# Patient Record
Sex: FEMALE | Race: WHITE | NOT HISPANIC OR LATINO | URBAN - METROPOLITAN AREA
[De-identification: names, ages, dates, MRNs, and addresses within clinical notes are randomized per-mention and may not be internally consistent; named-entity substitution may affect disease eponyms.]

---

## 2017-06-02 ENCOUNTER — OUTPATIENT (OUTPATIENT)
Dept: OUTPATIENT SERVICES | Facility: HOSPITAL | Age: 82
LOS: 1 days | Discharge: HOME | End: 2017-06-02

## 2017-06-02 DIAGNOSIS — N39.0 URINARY TRACT INFECTION, SITE NOT SPECIFIED: ICD-10-CM

## 2017-06-02 DIAGNOSIS — R07.9 CHEST PAIN, UNSPECIFIED: ICD-10-CM

## 2017-06-28 DIAGNOSIS — Z12.31 ENCOUNTER FOR SCREENING MAMMOGRAM FOR MALIGNANT NEOPLASM OF BREAST: ICD-10-CM

## 2017-06-28 DIAGNOSIS — Z85.3 PERSONAL HISTORY OF MALIGNANT NEOPLASM OF BREAST: ICD-10-CM

## 2017-09-28 ENCOUNTER — INPATIENT (INPATIENT)
Facility: HOSPITAL | Age: 82
LOS: 4 days | Discharge: NURSING, REHAB OR LONG-TERM CARE | End: 2017-10-03
Attending: INTERNAL MEDICINE | Admitting: INTERNAL MEDICINE

## 2017-09-28 DIAGNOSIS — N39.0 URINARY TRACT INFECTION, SITE NOT SPECIFIED: ICD-10-CM

## 2017-09-28 DIAGNOSIS — R07.9 CHEST PAIN, UNSPECIFIED: ICD-10-CM

## 2017-10-05 DIAGNOSIS — N10 ACUTE PYELONEPHRITIS: ICD-10-CM

## 2017-10-05 DIAGNOSIS — I12.9 HYPERTENSIVE CHRONIC KIDNEY DISEASE WITH STAGE 1 THROUGH STAGE 4 CHRONIC KIDNEY DISEASE, OR UNSPECIFIED CHRONIC KIDNEY DISEASE: ICD-10-CM

## 2017-10-05 DIAGNOSIS — B96.20 UNSPECIFIED ESCHERICHIA COLI [E. COLI] AS THE CAUSE OF DISEASES CLASSIFIED ELSEWHERE: ICD-10-CM

## 2017-10-05 DIAGNOSIS — N20.0 CALCULUS OF KIDNEY: ICD-10-CM

## 2017-10-05 DIAGNOSIS — K21.9 GASTRO-ESOPHAGEAL REFLUX DISEASE WITHOUT ESOPHAGITIS: ICD-10-CM

## 2017-10-05 DIAGNOSIS — N18.2 CHRONIC KIDNEY DISEASE, STAGE 2 (MILD): ICD-10-CM

## 2017-10-05 DIAGNOSIS — Z51.89 ENCOUNTER FOR OTHER SPECIFIED AFTERCARE: ICD-10-CM

## 2017-10-05 DIAGNOSIS — E78.5 HYPERLIPIDEMIA, UNSPECIFIED: ICD-10-CM

## 2017-10-05 DIAGNOSIS — Z85.3 PERSONAL HISTORY OF MALIGNANT NEOPLASM OF BREAST: ICD-10-CM

## 2017-10-05 DIAGNOSIS — K57.90 DIVERTICULOSIS OF INTESTINE, PART UNSPECIFIED, WITHOUT PERFORATION OR ABSCESS WITHOUT BLEEDING: ICD-10-CM

## 2017-10-05 DIAGNOSIS — Z85.72 PERSONAL HISTORY OF NON-HODGKIN LYMPHOMAS: ICD-10-CM

## 2017-10-16 DIAGNOSIS — N39.0 URINARY TRACT INFECTION, SITE NOT SPECIFIED: ICD-10-CM

## 2018-06-04 ENCOUNTER — OUTPATIENT (OUTPATIENT)
Dept: OUTPATIENT SERVICES | Facility: HOSPITAL | Age: 83
LOS: 1 days | Discharge: HOME | End: 2018-06-04

## 2018-06-04 DIAGNOSIS — Z85.3 PERSONAL HISTORY OF MALIGNANT NEOPLASM OF BREAST: ICD-10-CM

## 2018-06-04 DIAGNOSIS — Z12.31 ENCOUNTER FOR SCREENING MAMMOGRAM FOR MALIGNANT NEOPLASM OF BREAST: ICD-10-CM

## 2019-01-15 ENCOUNTER — EMERGENCY (EMERGENCY)
Facility: HOSPITAL | Age: 84
LOS: 0 days | Discharge: HOME | End: 2019-01-15
Attending: EMERGENCY MEDICINE | Admitting: EMERGENCY MEDICINE

## 2019-01-15 VITALS
SYSTOLIC BLOOD PRESSURE: 172 MMHG | TEMPERATURE: 98 F | DIASTOLIC BLOOD PRESSURE: 72 MMHG | OXYGEN SATURATION: 99 % | RESPIRATION RATE: 18 BRPM | HEART RATE: 77 BPM

## 2019-01-15 VITALS
OXYGEN SATURATION: 96 % | RESPIRATION RATE: 18 BRPM | TEMPERATURE: 97 F | HEART RATE: 64 BPM | SYSTOLIC BLOOD PRESSURE: 164 MMHG | DIASTOLIC BLOOD PRESSURE: 71 MMHG

## 2019-01-15 DIAGNOSIS — Z79.899 OTHER LONG TERM (CURRENT) DRUG THERAPY: ICD-10-CM

## 2019-01-15 DIAGNOSIS — R10.9 UNSPECIFIED ABDOMINAL PAIN: ICD-10-CM

## 2019-01-15 DIAGNOSIS — K59.00 CONSTIPATION, UNSPECIFIED: ICD-10-CM

## 2019-01-15 DIAGNOSIS — K21.9 GASTRO-ESOPHAGEAL REFLUX DISEASE WITHOUT ESOPHAGITIS: ICD-10-CM

## 2019-01-15 DIAGNOSIS — R35.0 FREQUENCY OF MICTURITION: ICD-10-CM

## 2019-01-15 DIAGNOSIS — Z90.49 ACQUIRED ABSENCE OF OTHER SPECIFIED PARTS OF DIGESTIVE TRACT: ICD-10-CM

## 2019-01-15 DIAGNOSIS — K62.89 OTHER SPECIFIED DISEASES OF ANUS AND RECTUM: ICD-10-CM

## 2019-01-15 DIAGNOSIS — E78.5 HYPERLIPIDEMIA, UNSPECIFIED: ICD-10-CM

## 2019-01-15 DIAGNOSIS — R30.0 DYSURIA: ICD-10-CM

## 2019-01-15 DIAGNOSIS — Z98.890 OTHER SPECIFIED POSTPROCEDURAL STATES: ICD-10-CM

## 2019-01-15 DIAGNOSIS — I12.9 HYPERTENSIVE CHRONIC KIDNEY DISEASE WITH STAGE 1 THROUGH STAGE 4 CHRONIC KIDNEY DISEASE, OR UNSPECIFIED CHRONIC KIDNEY DISEASE: ICD-10-CM

## 2019-01-15 DIAGNOSIS — N18.3 CHRONIC KIDNEY DISEASE, STAGE 3 (MODERATE): ICD-10-CM

## 2019-01-15 DIAGNOSIS — Z90.11 ACQUIRED ABSENCE OF RIGHT BREAST AND NIPPLE: ICD-10-CM

## 2019-01-15 LAB
ALBUMIN SERPL ELPH-MCNC: 4.6 G/DL — SIGNIFICANT CHANGE UP (ref 3.5–5.2)
ALP SERPL-CCNC: 69 U/L — SIGNIFICANT CHANGE UP (ref 30–115)
ALT FLD-CCNC: 24 U/L — SIGNIFICANT CHANGE UP (ref 0–41)
ANION GAP SERPL CALC-SCNC: 16 MMOL/L — HIGH (ref 7–14)
APPEARANCE UR: ABNORMAL
AST SERPL-CCNC: 95 U/L — HIGH (ref 0–41)
BASE EXCESS BLDV CALC-SCNC: 2.8 MMOL/L — HIGH (ref -2–2)
BASOPHILS # BLD AUTO: 0.12 K/UL — SIGNIFICANT CHANGE UP (ref 0–0.2)
BASOPHILS NFR BLD AUTO: 0.9 % — SIGNIFICANT CHANGE UP (ref 0–1)
BILIRUB SERPL-MCNC: 0.3 MG/DL — SIGNIFICANT CHANGE UP (ref 0.2–1.2)
BILIRUB UR-MCNC: NEGATIVE — SIGNIFICANT CHANGE UP
BUN SERPL-MCNC: 24 MG/DL — HIGH (ref 10–20)
CA-I SERPL-SCNC: 1.27 MMOL/L — SIGNIFICANT CHANGE UP (ref 1.12–1.3)
CALCIUM SERPL-MCNC: 10 MG/DL — SIGNIFICANT CHANGE UP (ref 8.5–10.1)
CHLORIDE SERPL-SCNC: 96 MMOL/L — LOW (ref 98–110)
CO2 SERPL-SCNC: 23 MMOL/L — SIGNIFICANT CHANGE UP (ref 17–32)
COLOR SPEC: YELLOW — SIGNIFICANT CHANGE UP
CREAT SERPL-MCNC: 1.2 MG/DL — SIGNIFICANT CHANGE UP (ref 0.7–1.5)
DIFF PNL FLD: NEGATIVE — SIGNIFICANT CHANGE UP
EOSINOPHIL # BLD AUTO: 0.63 K/UL — SIGNIFICANT CHANGE UP (ref 0–0.7)
EOSINOPHIL NFR BLD AUTO: 4.7 % — SIGNIFICANT CHANGE UP (ref 0–8)
EPI CELLS # UR: ABNORMAL /HPF
GAS PNL BLDV: 141 MMOL/L — SIGNIFICANT CHANGE UP (ref 136–145)
GAS PNL BLDV: SIGNIFICANT CHANGE UP
GLUCOSE SERPL-MCNC: 101 MG/DL — HIGH (ref 70–99)
GLUCOSE UR QL: NEGATIVE MG/DL — SIGNIFICANT CHANGE UP
HCO3 BLDV-SCNC: 29 MMOL/L — SIGNIFICANT CHANGE UP (ref 22–29)
HCT VFR BLD CALC: 44.2 % — SIGNIFICANT CHANGE UP (ref 37–47)
HCT VFR BLDA CALC: 44 % — SIGNIFICANT CHANGE UP (ref 34–44)
HGB BLD CALC-MCNC: 14.4 G/DL — SIGNIFICANT CHANGE UP (ref 14–18)
HGB BLD-MCNC: 14.2 G/DL — SIGNIFICANT CHANGE UP (ref 12–16)
IMM GRANULOCYTES NFR BLD AUTO: 0.2 % — SIGNIFICANT CHANGE UP (ref 0.1–0.3)
KETONES UR-MCNC: NEGATIVE — SIGNIFICANT CHANGE UP
LACTATE BLDV-MCNC: 0.7 MMOL/L — SIGNIFICANT CHANGE UP (ref 0.5–1.6)
LACTATE SERPL-SCNC: 0.7 MMOL/L — SIGNIFICANT CHANGE UP (ref 0.5–2.2)
LEUKOCYTE ESTERASE UR-ACNC: NEGATIVE — SIGNIFICANT CHANGE UP
LYMPHOCYTES # BLD AUTO: 24.8 % — SIGNIFICANT CHANGE UP (ref 20.5–51.1)
LYMPHOCYTES # BLD AUTO: 3.33 K/UL — SIGNIFICANT CHANGE UP (ref 1.2–3.4)
MCHC RBC-ENTMCNC: 27.2 PG — SIGNIFICANT CHANGE UP (ref 27–31)
MCHC RBC-ENTMCNC: 32.1 G/DL — SIGNIFICANT CHANGE UP (ref 32–37)
MCV RBC AUTO: 84.5 FL — SIGNIFICANT CHANGE UP (ref 81–99)
MONOCYTES # BLD AUTO: 1 K/UL — HIGH (ref 0.1–0.6)
MONOCYTES NFR BLD AUTO: 7.5 % — SIGNIFICANT CHANGE UP (ref 1.7–9.3)
NEUTROPHILS # BLD AUTO: 8.31 K/UL — HIGH (ref 1.4–6.5)
NEUTROPHILS NFR BLD AUTO: 61.9 % — SIGNIFICANT CHANGE UP (ref 42.2–75.2)
NITRITE UR-MCNC: NEGATIVE — SIGNIFICANT CHANGE UP
NRBC # BLD: 0 /100 WBCS — SIGNIFICANT CHANGE UP (ref 0–0)
PCO2 BLDV: 51 MMHG — SIGNIFICANT CHANGE UP (ref 41–51)
PH BLDV: 7.37 — SIGNIFICANT CHANGE UP (ref 7.26–7.43)
PH UR: 6.5 — SIGNIFICANT CHANGE UP (ref 5–8)
PLATELET # BLD AUTO: 299 K/UL — SIGNIFICANT CHANGE UP (ref 130–400)
PO2 BLDV: 28 MMHG — SIGNIFICANT CHANGE UP (ref 20–40)
POTASSIUM BLDV-SCNC: 4.4 MMOL/L — SIGNIFICANT CHANGE UP (ref 3.3–5.6)
POTASSIUM SERPL-MCNC: 7 MMOL/L — CRITICAL HIGH (ref 3.5–5)
POTASSIUM SERPL-SCNC: 7 MMOL/L — CRITICAL HIGH (ref 3.5–5)
PROT SERPL-MCNC: 7.9 G/DL — SIGNIFICANT CHANGE UP (ref 6–8)
PROT UR-MCNC: NEGATIVE MG/DL — SIGNIFICANT CHANGE UP
RBC # BLD: 5.23 M/UL — SIGNIFICANT CHANGE UP (ref 4.2–5.4)
RBC # FLD: 13.8 % — SIGNIFICANT CHANGE UP (ref 11.5–14.5)
SAO2 % BLDV: 50 % — SIGNIFICANT CHANGE UP
SODIUM SERPL-SCNC: 135 MMOL/L — SIGNIFICANT CHANGE UP (ref 135–146)
SP GR SPEC: <=1.005 — SIGNIFICANT CHANGE UP (ref 1.01–1.03)
UROBILINOGEN FLD QL: 0.2 MG/DL — SIGNIFICANT CHANGE UP (ref 0.2–0.2)
WBC # BLD: 13.42 K/UL — HIGH (ref 4.8–10.8)
WBC # FLD AUTO: 13.42 K/UL — HIGH (ref 4.8–10.8)
WBC UR QL: SIGNIFICANT CHANGE UP /HPF

## 2019-01-15 RX ORDER — IOHEXOL 300 MG/ML
30 INJECTION, SOLUTION INTRAVENOUS ONCE
Qty: 0 | Refills: 0 | Status: COMPLETED | OUTPATIENT
Start: 2019-01-15 | End: 2019-01-15

## 2019-01-15 RX ORDER — CIPROFLOXACIN LACTATE 400MG/40ML
1 VIAL (ML) INTRAVENOUS
Qty: 20 | Refills: 0
Start: 2019-01-15 | End: 2019-01-24

## 2019-01-15 RX ORDER — CIPROFLOXACIN LACTATE 400MG/40ML
250 VIAL (ML) INTRAVENOUS ONCE
Qty: 0 | Refills: 0 | Status: COMPLETED | OUTPATIENT
Start: 2019-01-15 | End: 2019-01-15

## 2019-01-15 RX ORDER — METRONIDAZOLE 500 MG
1 TABLET ORAL
Qty: 30 | Refills: 0
Start: 2019-01-15 | End: 2019-01-24

## 2019-01-15 RX ORDER — SODIUM CHLORIDE 9 MG/ML
1000 INJECTION INTRAMUSCULAR; INTRAVENOUS; SUBCUTANEOUS ONCE
Qty: 0 | Refills: 0 | Status: COMPLETED | OUTPATIENT
Start: 2019-01-15 | End: 2019-01-15

## 2019-01-15 RX ORDER — METRONIDAZOLE 500 MG
500 TABLET ORAL ONCE
Qty: 0 | Refills: 0 | Status: COMPLETED | OUTPATIENT
Start: 2019-01-15 | End: 2019-01-15

## 2019-01-15 RX ADMIN — Medication 250 MILLIGRAM(S): at 18:07

## 2019-01-15 RX ADMIN — IOHEXOL 30 MILLILITER(S): 300 INJECTION, SOLUTION INTRAVENOUS at 12:23

## 2019-01-15 RX ADMIN — SODIUM CHLORIDE 2000 MILLILITER(S): 9 INJECTION INTRAMUSCULAR; INTRAVENOUS; SUBCUTANEOUS at 13:11

## 2019-01-15 RX ADMIN — Medication 500 MILLIGRAM(S): at 18:07

## 2019-01-15 NOTE — ED PROVIDER NOTE - OBJECTIVE STATEMENT
82yo woman h/o HTN, HLD, hiatal hernia, breast CA s/p R mastectomy, lymphoma in remission, bladder prolapse with frequent UTI, diverticulitis presents with constipation 4 days, which is unusual for her. Only surgical history is appendectomy at age 14. She reports that she has been passing some mucous but very little flatus. Denies fever, chills, vomiting, and has been eating ok, but reports some nausea this morning. Additionally she reports urinary frequency with dysuria--she follows with urology for the frequent UTI and takes macrobid for suppression, but hasn't over the last few days because she hasn't been feeling well. She has some crampy discomfort but no chay abdominal pain, but she is very concerned because straining causes increased frequency of prolapse and that is uncomfortable. 82yo woman h/o HTN, HLD, CKD3, hiatal hernia, breast CA s/p R mastectomy, lymphoma in remission, bladder prolapse with frequent UTI, diverticulitis presents with constipation 4 days, which is unusual for her. Only surgical history is appendectomy at age 14. She reports that she has been passing some mucous but very little flatus. Denies fever, chills, vomiting, and has been eating ok, but reports some nausea this morning. Additionally she reports urinary frequency with dysuria--she follows with urology for the frequent UTI and takes macrobid for suppression, but hasn't over the last few days because she hasn't been feeling well. She has some crampy discomfort but no chay abdominal pain, but she is very concerned because straining causes increased frequency of prolapse and that is uncomfortable. 84yo woman h/o HTN, HLD, CKD3, hiatal hernia, breast CA s/p R mastectomy, lymphoma in remission, bladder prolapse with frequent UTI, diverticulitis presents with constipation 4 days, which is unusual for her. Only surgical history is appendectomy at age 14. She reports that she has been passing some mucous but very little flatus. Denies fever, chills, vomiting, and has been eating ok, but reports some nausea this morning. Additionally she reports urinary frequency with dysuria--she follows with urology for the frequent UTI and takes macrobid for suppression, but hasn't over the last few days because she hasn't been feeling well. She has some crampy discomfort but no chay abdominal pain, but she is very concerned because straining causes increased frequency of prolapse and that is uncomfortable. No relief with colace.

## 2019-01-15 NOTE — ED PROVIDER NOTE - NSFOLLOWUPINSTRUCTIONS_ED_ALL_ED_FT
1. begin taking ciprofloxacin and flagyl tomorrow morning. Cipro is to be taken every 12 hours, Flagyl every 8 hours for the next 10 days.  2. begin taking Miralax 1 capful every 12 hours (over the counter) until you begin moving your bowels, then go down to once per day.  3. follow up with Dr Toney of GI.  4. return to the ER for worsening pain, fever, vomiting, or any other concerns.

## 2019-01-15 NOTE — ED ADULT NURSE NOTE - PMH
Breast CA    GERD (gastroesophageal reflux disease)    Hernia    HTN (hypertension)    Prolapsed uterus

## 2019-01-15 NOTE — ED PROVIDER NOTE - CARE PLAN
Assessment and plan of treatment:	A/P constipation with concern for underlying pathology such as diverticulitis vs UTI, less likely SBO. Will check labs, CT, reassess. Principal Discharge DX:	Proctitis  Assessment and plan of treatment:	A/P constipation with concern for underlying pathology such as diverticulitis vs UTI, less likely SBO. Will check labs, CT, reassess.

## 2019-01-15 NOTE — ED PROVIDER NOTE - PHYSICAL EXAMINATION
VITAL SIGNS: I have reviewed nursing notes and confirm.  CONSTITUTIONAL: Looks younger than stated age, nontoxic appearing and comfortable sitting up on edge of stretcher  SKIN: Skin exam is warm and dry, no acute rash.  HEAD: Normocephalic; atraumatic.  EYES: PERRL, EOM intact; conjunctiva and sclera clear.  ENT: No nasal discharge; airway clear. TMs clear.  NECK: Supple; non tender.  CARD: S1, S2 RRR  RESP: No wheezes, rales or rhonchi.  ABD: Distended, soft, very mild LLQ tenderness to deep palpation, no peritoneal signs  EXT: Normal ROM. No clubbing, cyanosis or edema.  LYMPH: No acute cervical adenopathy.  NEURO: Alert, oriented. Grossly unremarkable. No focal deficits.  PSYCH: Cooperative, appropriate.

## 2019-01-15 NOTE — ED PROVIDER NOTE - PLAN OF CARE
A/P constipation with concern for underlying pathology such as diverticulitis vs UTI, less likely SBO. Will check labs, CT, reassess.

## 2019-01-15 NOTE — ED PROVIDER NOTE - NS ED ROS FT
Constitutional: (-) fever  Eyes/ENT: (-) blurry vision, (-) epistaxis  Cardiovascular: (-) chest pain, (-) syncope  Respiratory: (-) cough, (-) shortness of breath  Gastrointestinal: (-) vomiting, (-) diarrhea (SEE HPI)  Musculoskeletal: (-) neck pain, (-) back pain, (-) joint pain  Integumentary: (-) rash, (-) edema  Neurological: (-) headache, (-) altered mental status  Psychiatric: (-) hallucinations  Allergic/Immunologic: (-) pruritus

## 2019-01-15 NOTE — ED PROVIDER NOTE - MEDICAL DECISION MAKING DETAILS
CT with proctitis vs neoplasm. As sx so recent, more likely proctitis, pt has good f/u with PMD and GI, given course of cipro/flagyl and rec mirolax, return to the ED for worsening sx.

## 2019-01-16 LAB
CULTURE RESULTS: SIGNIFICANT CHANGE UP
SPECIMEN SOURCE: SIGNIFICANT CHANGE UP

## 2019-01-23 ENCOUNTER — MOBILE ON CALL (OUTPATIENT)
Age: 84
End: 2019-01-23

## 2019-01-23 PROBLEM — I10 ESSENTIAL (PRIMARY) HYPERTENSION: Chronic | Status: ACTIVE | Noted: 2019-01-15

## 2019-01-23 PROBLEM — N81.4 UTEROVAGINAL PROLAPSE, UNSPECIFIED: Chronic | Status: ACTIVE | Noted: 2019-01-15

## 2019-01-23 PROBLEM — K46.9 UNSPECIFIED ABDOMINAL HERNIA WITHOUT OBSTRUCTION OR GANGRENE: Chronic | Status: ACTIVE | Noted: 2019-01-15

## 2019-01-23 PROBLEM — C50.919 MALIGNANT NEOPLASM OF UNSPECIFIED SITE OF UNSPECIFIED FEMALE BREAST: Chronic | Status: ACTIVE | Noted: 2019-01-15

## 2019-01-23 PROBLEM — K21.9 GASTRO-ESOPHAGEAL REFLUX DISEASE WITHOUT ESOPHAGITIS: Chronic | Status: ACTIVE | Noted: 2019-01-15

## 2019-02-04 ENCOUNTER — APPOINTMENT (OUTPATIENT)
Dept: OBGYN | Facility: CLINIC | Age: 84
End: 2019-02-04

## 2019-02-06 ENCOUNTER — APPOINTMENT (OUTPATIENT)
Dept: UROGYNECOLOGY | Facility: CLINIC | Age: 84
End: 2019-02-06

## 2019-02-06 ENCOUNTER — OUTPATIENT (OUTPATIENT)
Dept: OUTPATIENT SERVICES | Facility: HOSPITAL | Age: 84
LOS: 1 days | Discharge: HOME | End: 2019-02-06

## 2019-02-06 DIAGNOSIS — I10 ESSENTIAL (PRIMARY) HYPERTENSION: ICD-10-CM

## 2019-02-06 DIAGNOSIS — N81.9 FEMALE GENITAL PROLAPSE, UNSPECIFIED: ICD-10-CM

## 2019-02-06 DIAGNOSIS — Z87.440 PERSONAL HISTORY OF URINARY (TRACT) INFECTIONS: ICD-10-CM

## 2019-02-06 DIAGNOSIS — N28.9 DISORDER OF KIDNEY AND URETER, UNSPECIFIED: ICD-10-CM

## 2019-02-06 DIAGNOSIS — N81.2 INCOMPLETE UTEROVAGINAL PROLAPSE: ICD-10-CM

## 2019-02-06 DIAGNOSIS — R73.03 PREDIABETES.: ICD-10-CM

## 2019-02-06 DIAGNOSIS — K57.90 DIVERTICULOSIS OF INTESTINE, PART UNSPECIFIED, W/OUT PERFORATION OR ABSCESS W/OUT BLEEDING: ICD-10-CM

## 2019-02-06 NOTE — DISCUSSION/SUMMARY
[FreeTextEntry1] : \par Complete Uterovaginal Prolapse:\par The patient was counseled regarding the possible natural progression of prolapse and the clinical consequences of worsening prolapse. The stage and the location of the prolapse was reviewed with the patient. She was counseled regarding the management strategies including observation, pessary placement and surgery (Lefort colpocleisis/posterior colporrhaphy/perineorrhaphy). The patient voiced understanding and agrees to consider her options.\par  \par Constipation:\par The patient was counseled about her defecatory dysfunction. We discussed the importance of fiber, hydration and exercise. She was given a handout with specific information about dietary fiber and ways to relieve constipation. \par \par Fecal Incontinence:\par The patient was counseled about her fecal incontinence.  We discussed the importance of dietary fiber, hydration, physical activity, pelvic floor rehabilitation medication and minimally invasive surgical procedures.  She was given a handout with specific information about dietary fiber and ways to relieve fecal incontinence.\par \par Urinary Frequency:\par The pathophysiology of the above condition was discussed with the patient. The patient was given information and education on pelvic floor muscle exercises/rehabilitation, avoidance of dietary bladder irritants, and other strategies to improve bladder control, such as scheduled voiding. She was counseled regarding further management strategies for overactive bladder and urge incontinence including pelvic floor physical therapy, medications or surgical management. The patient voiced understanding and agrees with diet changes. We will follow up on her urine tests.\par

## 2019-02-06 NOTE — COUNSELING
[FreeTextEntry1] : \par Please follow up with your Gastroenterologist. \par \par We will contact you if the urine results are abnormal. \par \par Make changes to your diet as listed on the handout to decrease irritation to the bladder. \par \par Please increase your daily water intake (at least 4 cups a day). \par \par Please follow our recommended bowel recipe to control your constipation. \par \par Please review the handouts about treatments for the vaginal bulge/prolapse (the bladder, the rectum and the uterus/womb is herniating through the vagina):\par 1) pessary-device into the vagina\par 2) surgery-colpocleisis (vaginal approach)\par \par Please let us know if you decide on a management for the prolapse\par \par Deepest condolences to you and  your family. \par

## 2019-02-06 NOTE — HISTORY OF PRESENT ILLNESS
[FreeTextEntry1] : \par Pt with pelvic floor dysfunction here for urogynecologic evaluation. She describes: \par Referring provider: Dr CRUZITO Sharma\par PCP: Dr Patrick Stallings\par \par Chief PFD: pain and bulge\par \par Last UTI 8/18.\par Is taking macrodantin 3x/week for prophylaxis for UTI for the past 6 months\par CKD Cr 1.16 11/18\par \par Pelvic organ prolapse: s/p appy, s/p hernia surgery x2, +bulge for the past 2 months, worsening. Denies treatment and splinting\par Stress urinary incontinence: for the past month, small volumes\par Overactive bladder syndrome: voids q 1 hr? Bothersome. Pain and urgency when she attempts to hold urine, improve with voiding. + Nocturia. Denies enuresis, incontinence, prior treatment, glaucoma.\par Voiding dysfunction: Occasional Incomplete bladder emptying,denies hesitancy \par Lower urinary tract/vaginal symptoms: no recurrent UTIs per year, no hematuria, + dysuria for the past week, as above bladder pain \par Fecal incontinence: denies but has leakage of mucus from rectum\par Defecatory dysfunction: small balls. Last colonoscopy 4 yrs ago\par Sexual dysfunction: Not sexually active. \par Pelvic pain: constant sharp lower quadrant pain. Voiding and bowel movements alleviate the pain. 6/10 intensity. Denies radiation, aggravating factors. for the last two weeks. has it currently.\par Vaginal dryness: hx of breast cancer, denies vaginal dryness\par \par Her pelvic floor symptoms are significantly bothersome and negatively impacting her quality of life. \par \par

## 2019-02-07 ENCOUNTER — APPOINTMENT (OUTPATIENT)
Dept: HEMATOLOGY ONCOLOGY | Facility: CLINIC | Age: 84
End: 2019-02-07

## 2019-02-07 LAB
APPEARANCE: CLEAR
BILIRUBIN URINE: NEGATIVE
BLOOD URINE: NEGATIVE
COLOR: YELLOW
GLUCOSE QUALITATIVE U: NEGATIVE MG/DL
KETONES URINE: NEGATIVE
LEUKOCYTE ESTERASE URINE: NEGATIVE
NITRITE URINE: NEGATIVE
PH URINE: 6
PROTEIN URINE: NEGATIVE MG/DL
SPECIFIC GRAVITY URINE: 1.01
UROBILINOGEN URINE: 0.2 MG/DL (ref 0.2–?)

## 2019-02-08 LAB — BACTERIA UR CULT: NORMAL

## 2019-02-12 DIAGNOSIS — N81.3 COMPLETE UTEROVAGINAL PROLAPSE: ICD-10-CM

## 2019-02-12 DIAGNOSIS — R15.9 FULL INCONTINENCE OF FECES: ICD-10-CM

## 2019-02-12 DIAGNOSIS — K59.00 CONSTIPATION, UNSPECIFIED: ICD-10-CM

## 2019-02-12 DIAGNOSIS — R35.0 FREQUENCY OF MICTURITION: ICD-10-CM

## 2019-02-21 ENCOUNTER — APPOINTMENT (OUTPATIENT)
Dept: HEMATOLOGY ONCOLOGY | Facility: CLINIC | Age: 84
End: 2019-02-21

## 2019-03-04 ENCOUNTER — APPOINTMENT (OUTPATIENT)
Dept: HEMATOLOGY ONCOLOGY | Facility: CLINIC | Age: 84
End: 2019-03-04

## 2019-03-04 ENCOUNTER — LABORATORY RESULT (OUTPATIENT)
Age: 84
End: 2019-03-04

## 2019-03-04 ENCOUNTER — OUTPATIENT (OUTPATIENT)
Dept: OUTPATIENT SERVICES | Facility: HOSPITAL | Age: 84
LOS: 1 days | Discharge: HOME | End: 2019-03-04

## 2019-03-04 VITALS
WEIGHT: 158 LBS | BODY MASS INDEX: 31.02 KG/M2 | SYSTOLIC BLOOD PRESSURE: 160 MMHG | TEMPERATURE: 97.6 F | RESPIRATION RATE: 14 BRPM | HEART RATE: 80 BPM | HEIGHT: 60 IN | DIASTOLIC BLOOD PRESSURE: 74 MMHG

## 2019-03-04 DIAGNOSIS — Z80.9 FAMILY HISTORY OF MALIGNANT NEOPLASM, UNSPECIFIED: ICD-10-CM

## 2019-03-04 DIAGNOSIS — Z78.9 OTHER SPECIFIED HEALTH STATUS: ICD-10-CM

## 2019-03-04 LAB
HCT VFR BLD CALC: 41 %
HGB BLD-MCNC: 13.6 G/DL
MCHC RBC-ENTMCNC: 28.3 PG
MCHC RBC-ENTMCNC: 33.2 G/DL
MCV RBC AUTO: 85.2 FL
PLATELET # BLD AUTO: 254 K/UL
PMV BLD: 11 FL
RBC # BLD: 4.81 M/UL
RBC # FLD: 13.3 %
WBC # FLD AUTO: 12.31 K/UL

## 2019-03-04 NOTE — ASSESSMENT
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 \par \par PLAN:\par # Stage I right breast Ca s/p mastectomy/chemotherapy/adjuvant hormonal treatment several years ago, remaining in remission.\par - Last mammo 6/18: Negative.\par - Patient due to for mammogram 6/19, will give request.. \par - Clinical exam negative. \par \par # Stage II DLBCL s/p R-CHOP also several years ago:\par - Patient is asymptomatic, no fevers, chills, night sweats or weight loss.\par - Will check CBC, CMP, LDH\par \par All questions answered. \par Patient seen and examined with Dr Allison, who agreed with the above assessment and plan.

## 2019-03-04 NOTE — HISTORY OF PRESENT ILLNESS
[de-identified] : 82yo F with history of Stage II DLBCL s/p R-CHOP as well as Stage I right breast carcinoma s/p mastectomy followed by chemotherapy and adjuvant hormonal therapy. Follows on a yearly basis, is currently asymptomatic without any evidence of disease.  [de-identified] : 3/4/19\par Patient here for follow up of DLBCL and breast carcinoma.\par Denies any fevers, chills, night sweats, weight loss. \par She has been having frequent urinary tract infections, is following with Dr Lopez (urologist) who started her on Macrodantin. \par Was recently seen by Ob-Gyn and was found to have uterine prolapse.\par Last mammo 5/18: negative.\par Last colonoscopy 3 years ago: Diverticulosis.

## 2019-03-04 NOTE — PHYSICAL EXAM
[Restricted in physically strenuous activity but ambulatory and able to carry out work of a light or sedentary nature] : Status 1- Restricted in physically strenuous activity but ambulatory and able to carry out work of a light or sedentary nature, e.g., light house work, office work [Obese] : obese [Normal] : grossly intact [de-identified] : Right mastectomy site without any palpable abnormalities, left breast no masses or palpable adenopathy [de-identified] : Mild epigastric tenderness

## 2019-03-04 NOTE — REVIEW OF SYSTEMS
[Fatigue] : fatigue [Joint Pain] : joint pain [Joint Stiffness] : joint stiffness [Negative] : Endocrine [FreeTextEntry7] : Diverticulosis, epigastric pain [FreeTextEntry8] : Prolapsed Uterus

## 2019-03-04 NOTE — CONSULT LETTER
[Dear  ___] : Dear  [unfilled], [Please see my note below.] : Please see my note below. [Consult Closing:] : Thank you very much for allowing me to participate in the care of this patient.  If you have any questions, please do not hesitate to contact me. [Sincerely,] : Sincerely, [DrGama  ___] : Dr. REYES [Courtesy Letter:] : I had the pleasure of seeing your patient, [unfilled], in my office today. [FreeTextEntry2] : Dr. Patrick Amaya [FreeTextEntry3] : Venkata Allison MD

## 2019-03-05 LAB
ALBUMIN SERPL ELPH-MCNC: 4.6 G/DL
ALP BLD-CCNC: 76 U/L
ALT SERPL-CCNC: 13 U/L
ANION GAP SERPL CALC-SCNC: 16 MMOL/L
AST SERPL-CCNC: 23 U/L
BILIRUB SERPL-MCNC: 0.4 MG/DL
BUN SERPL-MCNC: 27 MG/DL
CALCIUM SERPL-MCNC: 10.3 MG/DL
CHLORIDE SERPL-SCNC: 106 MMOL/L
CO2 SERPL-SCNC: 19 MMOL/L
CREAT SERPL-MCNC: 1.2 MG/DL
GLUCOSE SERPL-MCNC: 85 MG/DL
LDH SERPL-CCNC: 226
POTASSIUM SERPL-SCNC: 4.7 MMOL/L
PROT SERPL-MCNC: 7.5 G/DL
SODIUM SERPL-SCNC: 141 MMOL/L

## 2019-03-05 RX ORDER — NITROFURANTOIN (MONOHYDRATE/MACROCRYSTALS) 25; 75 MG/1; MG/1
100 CAPSULE ORAL TWICE DAILY
Qty: 14 | Refills: 0 | Status: COMPLETED | COMMUNITY
Start: 2019-03-05 | End: 2019-03-12

## 2019-03-06 DIAGNOSIS — Z85.72 PERSONAL HISTORY OF NON-HODGKIN LYMPHOMAS: ICD-10-CM

## 2019-03-06 DIAGNOSIS — Z85.3 PERSONAL HISTORY OF MALIGNANT NEOPLASM OF BREAST: ICD-10-CM

## 2019-03-13 ENCOUNTER — APPOINTMENT (OUTPATIENT)
Dept: UROGYNECOLOGY | Facility: CLINIC | Age: 84
End: 2019-03-13

## 2019-03-13 ENCOUNTER — OUTPATIENT (OUTPATIENT)
Dept: OUTPATIENT SERVICES | Facility: HOSPITAL | Age: 84
LOS: 1 days | Discharge: HOME | End: 2019-03-13

## 2019-03-13 VITALS — DIASTOLIC BLOOD PRESSURE: 70 MMHG | SYSTOLIC BLOOD PRESSURE: 130 MMHG

## 2019-03-13 DIAGNOSIS — R35.0 FREQUENCY OF MICTURITION: ICD-10-CM

## 2019-03-13 RX ORDER — NITROFURANTOIN MACROCRYSTALS 50 MG/1
CAPSULE ORAL
Refills: 0 | Status: DISCONTINUED | COMMUNITY
End: 2019-03-13

## 2019-03-13 NOTE — COUNSELING
[FreeTextEntry1] : \par We will notify you of your urine results. \par \par Please schedule a pelvic ultrasound\par \par Please increase your daily water intake (at least 4 cups a day). \par \par Please begin taking Cipro 500 mg, twice a day for 7 days.\par \par Please take Diflucan 150 mg, one time dose. Take it the same day you start taking Cipro.\par \par Please schedule a pessary fitting/PVR check with GILDARDO Brown

## 2019-03-13 NOTE — HISTORY OF PRESENT ILLNESS
[FreeTextEntry1] : \par Patient is here for JUAN CARLOS\par Last seen 2/6/19 as NP for prolapse, urinary frequency, fecal incontinence, and constipation\par \par Patient called 3/5/19, c/o dysuria and frequency. Empirically prescribed Macrobid 100 mg BID x 7 days.\par Ucx was contaminated, and patient stated she was not feeling any better.\par She continues to have dysuria and abdominal cramping. Also states her prolapse is becoming bothersome and has mild spotting.\par \par Patient states she would like to have UTI taken care of prior to doing pessary fitting. However, she agrees to a pessary fitting.

## 2019-03-15 LAB — BACTERIA UR CULT: NORMAL

## 2019-03-15 RX ORDER — FLUCONAZOLE 150 MG/1
150 TABLET ORAL
Qty: 1 | Refills: 0 | Status: DISCONTINUED | COMMUNITY
Start: 2019-03-13 | End: 2019-03-15

## 2019-03-15 RX ORDER — CIPROFLOXACIN HYDROCHLORIDE 500 MG/1
500 TABLET, FILM COATED ORAL
Qty: 14 | Refills: 0 | Status: DISCONTINUED | COMMUNITY
Start: 2019-03-13 | End: 2019-03-15

## 2019-03-19 ENCOUNTER — APPOINTMENT (OUTPATIENT)
Dept: ANTEPARTUM | Facility: CLINIC | Age: 84
End: 2019-03-19

## 2019-03-21 DIAGNOSIS — N95.0 POSTMENOPAUSAL BLEEDING: ICD-10-CM

## 2019-06-07 ENCOUNTER — OUTPATIENT (OUTPATIENT)
Dept: OUTPATIENT SERVICES | Facility: HOSPITAL | Age: 84
LOS: 1 days | Discharge: HOME | End: 2019-06-07
Payer: MEDICARE

## 2019-06-07 DIAGNOSIS — Z85.3 PERSONAL HISTORY OF MALIGNANT NEOPLASM OF BREAST: ICD-10-CM

## 2019-06-07 DIAGNOSIS — Z12.31 ENCOUNTER FOR SCREENING MAMMOGRAM FOR MALIGNANT NEOPLASM OF BREAST: ICD-10-CM

## 2019-06-07 PROCEDURE — 77065 DX MAMMO INCL CAD UNI: CPT | Mod: 26,LT

## 2019-06-07 PROCEDURE — G0279: CPT | Mod: 26,LT

## 2019-10-16 ENCOUNTER — APPOINTMENT (OUTPATIENT)
Dept: UROGYNECOLOGY | Facility: CLINIC | Age: 84
End: 2019-10-16
Payer: MEDICARE

## 2019-10-16 ENCOUNTER — OUTPATIENT (OUTPATIENT)
Dept: OUTPATIENT SERVICES | Facility: HOSPITAL | Age: 84
LOS: 1 days | Discharge: HOME | End: 2019-10-16

## 2019-10-16 VITALS
WEIGHT: 158 LBS | HEIGHT: 60 IN | SYSTOLIC BLOOD PRESSURE: 146 MMHG | BODY MASS INDEX: 31.02 KG/M2 | DIASTOLIC BLOOD PRESSURE: 80 MMHG

## 2019-10-16 PROCEDURE — 99213 OFFICE O/P EST LOW 20 MIN: CPT | Mod: 25

## 2019-10-16 PROCEDURE — 51701 INSERT BLADDER CATHETER: CPT

## 2019-10-17 DIAGNOSIS — N39.490 OVERFLOW INCONTINENCE: ICD-10-CM

## 2019-10-17 DIAGNOSIS — R33.9 RETENTION OF URINE, UNSPECIFIED: ICD-10-CM

## 2019-10-18 NOTE — COUNSELING
[FreeTextEntry1] : \par We will notify you of your urine results. \par \par If there is a infection, we will treat the infection then check to see if you empty your bladder well without an infection.\par \par If there is NOT an infection, we can fit you for a new pessary, when you are ready.\par \par Please remember, if you continue to not empty well, you are at an increase risk of acute kidney damage, and we do not know when it will happen.\par \par We will follow up based on your urine results.\par

## 2019-10-18 NOTE — HISTORY OF PRESENT ILLNESS
[FreeTextEntry1] : \par Patient is present for pessary fitting and PVR check GH: 6  TVL: 6.5\par Last seen 3/13 for test of cure\par \par H/o Breast cancer\par \par 3/19: TVUS for postmenopausal bleeding: normal\par \par Denies stress incontinence. Not sexually active.\par \par Today, patient states she does not wish to do pessary fitting because she does not believe it will stay in place, especially when she has a bowel movement. She wishes to start medication for frequency and incontinence. She was unable to find the oat bran to try the bowel recipe. She is using Miralax advised by GI specialist and it is helping her.

## 2019-10-18 NOTE — DISCUSSION/SUMMARY
[FreeTextEntry1] : \par Incomplete bladder emptying:\par Advised the patient that this can be due to an acute UTI. Will send her urine to rule out infectious etiology. If not infected then I will recommend further workup with urodynamics. Discussed with the patient that we are concerned about incomplete bladder emptying because it can cause recurrent UTI and can cause kidney damage. The patient voiced understanding.\par  \par Overflow Incontinence:\par Advised that the urinary incontinence can be secondary to incomplete bladder emptying. We will discuss further management options for urinary incontinence If the patient continue to have incontinence after resolvement of incomplete bladder emptying.

## 2019-10-21 LAB — BACTERIA UR CULT: NORMAL

## 2019-11-22 ENCOUNTER — APPOINTMENT (OUTPATIENT)
Dept: UROGYNECOLOGY | Facility: CLINIC | Age: 84
End: 2019-11-22
Payer: MEDICARE

## 2019-11-22 ENCOUNTER — OUTPATIENT (OUTPATIENT)
Dept: OUTPATIENT SERVICES | Facility: HOSPITAL | Age: 84
LOS: 1 days | Discharge: HOME | End: 2019-11-22

## 2019-11-22 VITALS
HEIGHT: 60 IN | WEIGHT: 158 LBS | SYSTOLIC BLOOD PRESSURE: 118 MMHG | DIASTOLIC BLOOD PRESSURE: 76 MMHG | BODY MASS INDEX: 31.02 KG/M2

## 2019-11-22 PROCEDURE — ZZZZZ: CPT

## 2019-11-22 NOTE — HISTORY OF PRESENT ILLNESS
[FreeTextEntry1] : \par Patient is here for pessary fitting for complete uterovaginal prolapse. GH: 6 TVL: 6.5\par Last seen 10/16 for pessary fitting\par \par H/o Breast cancer\par CKD Cr 1.16 11/18\par \par 3/19: TVUS for postmenopausal bleeding: normal\par \par Denies stress incontinence. Not sexually active.\par \par At the last visit, patient did not want to do pessary fitting. PVR: 310 cc\par \par No complaints today.

## 2019-11-22 NOTE — COUNSELING
[FreeTextEntry1] : \par Please call the office if you have any issues with vaginal pain, vaginal bleeding, difficulty urinating or having a bowel movement or if the pessary falls out so that we can arrange another size pessary.\par \par Please follow up for pessary maintenance in 2 weeks with GILDARDO Brown \par \par Happy Holidays!

## 2019-11-22 NOTE — DISCUSSION/SUMMARY
[FreeTextEntry1] : \par Complete Uterovaginal Prolapse:\par Ring and Support #6 placed without difficulty. Remained in place with Valsalva, coughing, and ambulation. We did not have the actual pessary in the office. Pessary removed without difficulty.\par Ring and Support #8 placed without difficulty. Remained in place with Valsalva, coughing, and ambulation.\par The patient tolerated all fittings well.\par Will return in 2 weeks for routine pessary check.

## 2019-11-29 DIAGNOSIS — N81.3 COMPLETE UTEROVAGINAL PROLAPSE: ICD-10-CM

## 2019-12-11 ENCOUNTER — APPOINTMENT (OUTPATIENT)
Dept: UROGYNECOLOGY | Facility: CLINIC | Age: 84
End: 2019-12-11
Payer: MEDICARE

## 2019-12-11 ENCOUNTER — OUTPATIENT (OUTPATIENT)
Dept: OUTPATIENT SERVICES | Facility: HOSPITAL | Age: 84
LOS: 1 days | Discharge: HOME | End: 2019-12-11

## 2019-12-11 VITALS
HEIGHT: 60 IN | WEIGHT: 158 LBS | BODY MASS INDEX: 31.02 KG/M2 | DIASTOLIC BLOOD PRESSURE: 66 MMHG | SYSTOLIC BLOOD PRESSURE: 118 MMHG

## 2019-12-11 DIAGNOSIS — N39.490 OVERFLOW INCONTINENCE: ICD-10-CM

## 2019-12-11 DIAGNOSIS — R33.9 RETENTION OF URINE, UNSPECIFIED: ICD-10-CM

## 2019-12-11 PROCEDURE — ZZZZZ: CPT

## 2019-12-11 NOTE — COUNSELING
[FreeTextEntry1] : \par Please call the office if you have any issues with vaginal pain, vaginal bleeding, difficulty urinating or having a bowel movement or if the pessary falls out so that we can arrange another size pessary.\par \par Please follow up for pessary maintenance in 3 months with GILDARDO Brown \par \par Happy Holidays!

## 2019-12-11 NOTE — DISCUSSION/SUMMARY
[FreeTextEntry1] : \par Complete Uterovaginal Prolapse:\par Ring and SUpport #8 removed, cleaned, inspected and reinserted. The patient tolerated this well. \par No bleeding, lesions, abnormal discharge were noted. \par The patient will follow up in 3 months for routine pessary management.

## 2019-12-11 NOTE — HISTORY OF PRESENT ILLNESS
[FreeTextEntry1] : \par Patient is here for routine pessary cleaning for complete uterovaginal prolapse and PVR check.\par Last seen 11/22/19 for pessary fitting. Ring and Support #8\par \par H/o Breast cancer\par CKD Cr 1.16 11/18\par \par 3/19: TVUS for postmenopausal bleeding: normal\par \par Patient has intermittent incomplete bladder emptying without reduction. At NP visit on 02/06/19,  cc (normal PVR would be 143 cc or less based on the volume voided).\par On 10/16/19,  cc.\par \par Fitted for: Ring and Support #6 (we did not have the actual pessary in the office)\par \par Today, patient is doing well and has no complaints. She is very happy with pessary.

## 2019-12-18 DIAGNOSIS — N81.3 COMPLETE UTEROVAGINAL PROLAPSE: ICD-10-CM

## 2020-03-05 ENCOUNTER — OUTPATIENT (OUTPATIENT)
Dept: OUTPATIENT SERVICES | Facility: HOSPITAL | Age: 85
LOS: 1 days | Discharge: HOME | End: 2020-03-05

## 2020-03-05 ENCOUNTER — LABORATORY RESULT (OUTPATIENT)
Age: 85
End: 2020-03-05

## 2020-03-05 ENCOUNTER — APPOINTMENT (OUTPATIENT)
Dept: HEMATOLOGY ONCOLOGY | Facility: CLINIC | Age: 85
End: 2020-03-05
Payer: MEDICARE

## 2020-03-05 VITALS
RESPIRATION RATE: 14 BRPM | SYSTOLIC BLOOD PRESSURE: 147 MMHG | DIASTOLIC BLOOD PRESSURE: 66 MMHG | TEMPERATURE: 96.5 F | HEART RATE: 83 BPM | BODY MASS INDEX: 29.06 KG/M2 | WEIGHT: 148 LBS | HEIGHT: 60 IN

## 2020-03-05 DIAGNOSIS — Z85.3 PERSONAL HISTORY OF MALIGNANT NEOPLASM OF BREAST: ICD-10-CM

## 2020-03-05 DIAGNOSIS — Z85.79 PERSONAL HISTORY OF OTHER MALIGNANT NEOPLASMS OF LYMPHOID, HEMATOPOIETIC AND RELATED TISSUES: ICD-10-CM

## 2020-03-05 LAB
HCT VFR BLD CALC: 40.2 %
HGB BLD-MCNC: 13.2 G/DL
MCHC RBC-ENTMCNC: 28.9 PG
MCHC RBC-ENTMCNC: 32.8 G/DL
MCV RBC AUTO: 88 FL
PLATELET # BLD AUTO: 246 K/UL
PMV BLD: 10.8 FL
RBC # BLD: 4.57 M/UL
RBC # FLD: 12.5 %
WBC # FLD AUTO: 9.59 K/UL

## 2020-03-05 PROCEDURE — 99213 OFFICE O/P EST LOW 20 MIN: CPT

## 2020-03-05 NOTE — HISTORY OF PRESENT ILLNESS
[Disease: _____________________] : Disease: [unfilled] [AJCC Stage: ____] : AJCC Stage: [unfilled] [de-identified] : The patient is coming for her regularly scheduled follow up for her history of breast cancer (stage I) and lymphoma (DLBCL stage II), S/P chemotherapy, mastectomy and hormonal therapy. There were diagnosed in 2004.\par The patient's major concern seems to be right chest wall pain, intense, lasting 4-5 days, every 7-8 months. Otherwise no new particular complaints. She still has her chronic kidney disease followed by the primary care physician and her uterine prolapse followed by her gynecologist.\par The patient also has a torn meniscus in the right knee being observed. \par She is on no new medications. She is continuing with Toprol, nifedipine, Zetia, baby aspirin. \par The appetite is stable. No new UTIs or bowel problems. No cough or shortness of breath. \par

## 2020-03-05 NOTE — REVIEW OF SYSTEMS
[Recent Change In Weight] : ~T recent weight change [Loss of Hearing] : loss of hearing [Joint Pain] : joint pain [Negative] : Heme/Lymph [FreeTextEntry2] : Minimal weight loss, has been very active

## 2020-03-05 NOTE — PHYSICAL EXAM
[Fully active, able to carry on all pre-disease performance without restriction] : Status 0 - Fully active, able to carry on all pre-disease performance without restriction [Normal] : affect appropriate [de-identified] : S/P right mastectomy, no evidence of local recurrence. Left breast negative for any masses or discharge. [de-identified] : Arthritic changes

## 2020-03-05 NOTE — ASSESSMENT
[FreeTextEntry1] : 1. History of stage I right breast carcinoma, S/P mastectomy followed by chemotherapy and 5 years of tamoxifen, clinically no evidence or recurrence.\par 2. History of stage II DLBCL, S/P chemotherapy, no evidence of recurrence either.\par \par We will draw a CBC, CMP, LDH.\par She will have her mammogram this coming June.\par If all the above are within acceptable limits, she will be seen in a year for follow up.\par All questions answered.

## 2020-03-05 NOTE — CONSULT LETTER
[Dear  ___] : Dear ~DIPESH, [Courtesy Letter:] : I had the pleasure of seeing your patient, [unfilled], in my office today. [Please see my note below.] : Please see my note below. [Consult Closing:] : Thank you very much for allowing me to participate in the care of this patient.  If you have any questions, please do not hesitate to contact me. [Sincerely,] : Sincerely, [FreeTextEntry2] : Dr. Danie Crowe [FreeTextEntry3] : Dr. HALINA Allison

## 2020-03-06 LAB
ALBUMIN SERPL ELPH-MCNC: 4.3 G/DL
ALP BLD-CCNC: 60 U/L
ALT SERPL-CCNC: 35 U/L
ANION GAP SERPL CALC-SCNC: 13 MMOL/L
AST SERPL-CCNC: 125 U/L
BILIRUB SERPL-MCNC: 0.3 MG/DL
BUN SERPL-MCNC: 38 MG/DL
CALCIUM SERPL-MCNC: 9.9 MG/DL
CHLORIDE SERPL-SCNC: 103 MMOL/L
CO2 SERPL-SCNC: 23 MMOL/L
CREAT SERPL-MCNC: 1.3 MG/DL
GLUCOSE SERPL-MCNC: 79 MG/DL
LDH SERPL-CCNC: 180
POTASSIUM SERPL-SCNC: 4.8 MMOL/L
PROT SERPL-MCNC: 7.4 G/DL
SODIUM SERPL-SCNC: 139 MMOL/L

## 2020-03-11 ENCOUNTER — OUTPATIENT (OUTPATIENT)
Dept: OUTPATIENT SERVICES | Facility: HOSPITAL | Age: 85
LOS: 1 days | Discharge: HOME | End: 2020-03-11

## 2020-03-11 ENCOUNTER — APPOINTMENT (OUTPATIENT)
Dept: UROGYNECOLOGY | Facility: CLINIC | Age: 85
End: 2020-03-11
Payer: MEDICARE

## 2020-03-11 VITALS — DIASTOLIC BLOOD PRESSURE: 78 MMHG | SYSTOLIC BLOOD PRESSURE: 130 MMHG

## 2020-03-11 PROCEDURE — ZZZZZ: CPT

## 2020-03-11 NOTE — DISCUSSION/SUMMARY
[FreeTextEntry1] : \par Complete Uterovaginal Prolapse:\par Ring and Support #8  removed, cleaned, inspected and reinserted. The patient tolerated this well. \par No bleeding, lesions, abnormal discharge were noted. \par The patient will follow up in 3 months for routine pessary management.\par \par Urinary Frequency:\par Urine culture obtained.\par Will follow up.\par Will treat accordingly if necessary

## 2020-03-11 NOTE — HISTORY OF PRESENT ILLNESS
[FreeTextEntry1] : \par Patient is here for routine pessary cleaning for complete uterovaginal prolapse.\par Last seen 12/11/19 for pessary cleaning and PVR check. PVR: 20 cc \par \par Ring and Support #8\par \par H/o Breast cancer\par CKD Cr 1.16 11/18\par \par 3/19: TVUS for postmenopausal bleeding: normal\par \par Patient has intermittent incomplete bladder emptying without reduction. At NP visit on 02/06/19,  cc (normal PVR would be 143 cc or less based on the volume voided).\par On 10/16/19,  cc.\par \par Fitted for: Ring and Support #6 (we did not have the actual pessary in the office)\par \par Today, patient states she has had dysuria and frequency for the past few days.

## 2020-03-11 NOTE — COUNSELING
[FreeTextEntry1] : \par We will contact you if the urine results are abnormal. \par \par Please call the office if you have any issues with vaginal pain, vaginal bleeding, difficulty urinating or having a bowel movement or if the pessary falls out so that we can arrange another size pessary.\par \par Please follow up for pessary maintenance in 3 months with GILDARDO Brown

## 2020-03-13 DIAGNOSIS — N81.3 COMPLETE UTEROVAGINAL PROLAPSE: ICD-10-CM

## 2020-03-13 DIAGNOSIS — R35.0 FREQUENCY OF MICTURITION: ICD-10-CM

## 2020-03-13 LAB — BACTERIA UR CULT: NORMAL

## 2020-03-16 DIAGNOSIS — Z85.3 PERSONAL HISTORY OF MALIGNANT NEOPLASM OF BREAST: ICD-10-CM

## 2020-03-16 DIAGNOSIS — Z85.79 PERSONAL HISTORY OF OTHER MALIGNANT NEOPLASMS OF LYMPHOID, HEMATOPOIETIC AND RELATED TISSUES: ICD-10-CM

## 2020-06-03 ENCOUNTER — OUTPATIENT (OUTPATIENT)
Dept: OUTPATIENT SERVICES | Facility: HOSPITAL | Age: 85
LOS: 1 days | Discharge: HOME | End: 2020-06-03

## 2020-06-03 ENCOUNTER — APPOINTMENT (OUTPATIENT)
Dept: UROGYNECOLOGY | Facility: CLINIC | Age: 85
End: 2020-06-03
Payer: MEDICARE

## 2020-06-03 VITALS
DIASTOLIC BLOOD PRESSURE: 78 MMHG | SYSTOLIC BLOOD PRESSURE: 122 MMHG | BODY MASS INDEX: 29.06 KG/M2 | HEIGHT: 60 IN | WEIGHT: 148 LBS

## 2020-06-03 PROCEDURE — ZZZZZ: CPT

## 2020-06-03 NOTE — HISTORY OF PRESENT ILLNESS
[FreeTextEntry1] : \par Patient is here for routine pessary cleaning for complete uterovaginal prolapse.\par Last seen 3/11/2020 for pessary cleaning. Ring and Support #8\par \par Ring and Support #8\par \par H/o Breast cancer\par CKD Cr 1.16 11/18\par \par 3/19: TVUS for postmenopausal bleeding: normal\par \par Patient has intermittent incomplete bladder emptying without reduction. At NP visit on 02/06/19,  cc (normal PVR would be 143 cc or less based on the volume voided).\par On 10/16/19,  cc.\par \par Fitted for: Ring and Support #6 (we did not have the actual pessary in the office)\par \par Today, patient is doing well and has no complaints.

## 2020-06-03 NOTE — DISCUSSION/SUMMARY
[FreeTextEntry1] : \par Complete Uterovaginal Prolapse:\par Ring and Support #8 removed, cleaned, inspected and reinserted. The patient tolerated this well. \par No bleeding, lesions, abnormal discharge were noted. \par The patient will follow up in 3 months for routine pessary management.\par

## 2020-06-03 NOTE — COUNSELING
[FreeTextEntry1] : \par Please call the office if you have any issues with vaginal pain, vaginal bleeding, difficulty urinating or having a bowel movement or if the pessary falls out so that we can arrange another size pessary.\par \par Please follow up for pessary maintenance in 3 months with GILDARDO Brown

## 2020-06-22 ENCOUNTER — OUTPATIENT (OUTPATIENT)
Dept: OUTPATIENT SERVICES | Facility: HOSPITAL | Age: 85
LOS: 1 days | Discharge: HOME | End: 2020-06-22
Payer: MEDICARE

## 2020-06-22 DIAGNOSIS — Z85.3 PERSONAL HISTORY OF MALIGNANT NEOPLASM OF BREAST: ICD-10-CM

## 2020-06-22 PROCEDURE — 77065 DX MAMMO INCL CAD UNI: CPT | Mod: 26,LT

## 2020-06-22 PROCEDURE — G0279: CPT | Mod: 26

## 2020-09-02 ENCOUNTER — OUTPATIENT (OUTPATIENT)
Dept: OUTPATIENT SERVICES | Facility: HOSPITAL | Age: 85
LOS: 1 days | Discharge: HOME | End: 2020-09-02

## 2020-09-02 ENCOUNTER — APPOINTMENT (OUTPATIENT)
Dept: UROGYNECOLOGY | Facility: CLINIC | Age: 85
End: 2020-09-02
Payer: MEDICARE

## 2020-09-02 VITALS — SYSTOLIC BLOOD PRESSURE: 126 MMHG | HEIGHT: 60 IN | DIASTOLIC BLOOD PRESSURE: 70 MMHG

## 2020-09-02 PROCEDURE — ZZZZZ: CPT

## 2020-09-06 NOTE — DISCUSSION/SUMMARY
[FreeTextEntry1] : Complete Uterovaginal Prolapse:\par Ring and support #8 removed, cleaned, inspected and reinserted. The patient tolerated this well. \par No bleeding, lesions, abnormal discharge were noted. \par The patient will follow up in 3 months for routine pessary management.\par \par

## 2020-09-06 NOTE — HISTORY OF PRESENT ILLNESS
[FreeTextEntry1] : Patient is here for routine pessary cleaning for complete uterovaginal prolapse.\par Last seen 6/3/2020 for pessary cleaning. Ring and support #8. PVR is previously normal with this size and type of pessary.\par \par H/o Breast cancer\par CKD Cr 1.16 11/18\par \par 3/19: TVUS for postmenopausal bleeding: normal\par \par Patient has intermittent incomplete bladder emptying without reduction. At NP visit on 02/06/19,  cc (normal PVR would be 143 cc or less based on the volume voided).\par On 10/16/19,  cc.\par \par Fitted for: Ring and Support #6 (we did not have the actual pessary in the office)\par \par Today, patient is doing well and has no complaints.\par

## 2020-09-06 NOTE — COUNSELING
[FreeTextEntry1] : Please call the office if you have any issues with vaginal pain, vaginal bleeding, difficulty urinating or having a bowel movement or if the pessary falls out so that we can arrange another size pessary.\par \par Please follow up for pessary maintenance in 3 months with GILDARDO Sanchez\par

## 2020-12-02 ENCOUNTER — APPOINTMENT (OUTPATIENT)
Dept: UROGYNECOLOGY | Facility: CLINIC | Age: 85
End: 2020-12-02
Payer: MEDICARE

## 2020-12-02 ENCOUNTER — OUTPATIENT (OUTPATIENT)
Dept: OUTPATIENT SERVICES | Facility: HOSPITAL | Age: 85
LOS: 1 days | Discharge: HOME | End: 2020-12-02

## 2020-12-02 VITALS — SYSTOLIC BLOOD PRESSURE: 104 MMHG | DIASTOLIC BLOOD PRESSURE: 60 MMHG | HEIGHT: 60 IN

## 2020-12-02 PROCEDURE — 99213 OFFICE O/P EST LOW 20 MIN: CPT

## 2020-12-02 NOTE — COUNSELING
[FreeTextEntry1] : Please call the office if you have any issues with vaginal pain, vaginal bleeding, difficulty urinating or having a bowel movement or if the pessary falls out so that we can arrange another size pessary.\par \par We will contact you if the urine results are abnormal.\par \par Please follow up for pessary maintenance in 3 months with GILDARDO Sanchez\par

## 2020-12-02 NOTE — PHYSICAL EXAM
[Chaperone Present] : A chaperone was present in the examining room during all aspects of the physical examination [FreeTextEntry1] : Urethra was prepped in sterile fashion and then a sterile catheter was used by me to drain the bladder.\par void: 60cc [No Acute Distress] : in no acute distress [Well developed] : well developed [Well Nourished] : ~L well nourished

## 2020-12-02 NOTE — HISTORY OF PRESENT ILLNESS
[FreeTextEntry1] : Patient is here for routine pessary cleaning for complete uterovaginal prolapse.\par Last seen 9/2/20 for pessary cleaning. Ring and support # 8\par \par PVR is previously normal with this size and type of pessary.\par \par H/o Breast cancer\par CKD Cr 1.16 11/18\par \par 3/19: TVUS for postmenopausal bleeding: normal\par \par Patient has intermittent incomplete bladder emptying without reduction. At NP visit on 02/06/19,  cc (normal PVR would be 143 cc or less based on the volume voided).\par On 10/16/19,  cc.\par \par Fitted for: Ring and Support #6 (we did not have the actual pessary in the office)\par \par Today, patient is doing well. She's c/o possibly having urine infection and would like to be tested. Pt felt burning with urination last week and some back pain. Denies fever or hematuria. \par

## 2020-12-02 NOTE — DISCUSSION/SUMMARY
[FreeTextEntry1] : Complete Uterovaginal Prolapse:\par Ring and support #8 removed, cleaned, inspected and reinserted. The patient tolerated this well. \par No bleeding, lesions, abnormal discharge were noted. \par The patient will follow up in 3 months for routine pessary management.\par \par Urinary Frequency \par Urine culture obtained.\par Will follow up.\par Will treat accordingly if necessary\par

## 2020-12-04 LAB — BACTERIA UR CULT: NORMAL

## 2021-01-15 ENCOUNTER — EMERGENCY (EMERGENCY)
Facility: HOSPITAL | Age: 86
LOS: 0 days | Discharge: HOME | End: 2021-01-15
Attending: EMERGENCY MEDICINE | Admitting: EMERGENCY MEDICINE
Payer: MEDICARE

## 2021-01-15 VITALS
SYSTOLIC BLOOD PRESSURE: 165 MMHG | DIASTOLIC BLOOD PRESSURE: 84 MMHG | TEMPERATURE: 97 F | RESPIRATION RATE: 20 BRPM | OXYGEN SATURATION: 100 % | HEART RATE: 75 BPM

## 2021-01-15 VITALS — HEIGHT: 60 IN
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
## 2021-01-15 DIAGNOSIS — R51.9 HEADACHE, UNSPECIFIED: ICD-10-CM

## 2021-01-15 DIAGNOSIS — Z85.3 PERSONAL HISTORY OF MALIGNANT NEOPLASM OF BREAST: ICD-10-CM

## 2021-01-15 DIAGNOSIS — Z88.8 ALLERGY STATUS TO OTHER DRUGS, MEDICAMENTS AND BIOLOGICAL SUBSTANCES: ICD-10-CM

## 2021-01-15 DIAGNOSIS — Z79.899 OTHER LONG TERM (CURRENT) DRUG THERAPY: ICD-10-CM

## 2021-01-15 DIAGNOSIS — Z88.0 ALLERGY STATUS TO PENICILLIN: ICD-10-CM
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
## 2021-01-15 DIAGNOSIS — S09.90XA UNSPECIFIED INJURY OF HEAD, INITIAL ENCOUNTER: ICD-10-CM

## 2021-01-15 DIAGNOSIS — S01.81XA LACERATION WITHOUT FOREIGN BODY OF OTHER PART OF HEAD, INITIAL ENCOUNTER: ICD-10-CM

## 2021-01-15 DIAGNOSIS — M25.561 PAIN IN RIGHT KNEE: ICD-10-CM
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
## 2021-01-15 DIAGNOSIS — I10 ESSENTIAL (PRIMARY) HYPERTENSION: ICD-10-CM
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
## 2021-01-15 PROCEDURE — 12013 RPR F/E/E/N/L/M 2.6-5.0 CM: CPT | Mod: GC

## 2021-01-15 PROCEDURE — 73562 X-RAY EXAM OF KNEE 3: CPT | Mod: 26,RT

## 2021-01-15 PROCEDURE — 99284 EMERGENCY DEPT VISIT MOD MDM: CPT | Mod: 25,GC

## 2021-01-15 PROCEDURE — 72170 X-RAY EXAM OF PELVIS: CPT | Mod: 26

## 2021-01-15 PROCEDURE — 70450 CT HEAD/BRAIN W/O DYE: CPT | Mod: 26

## 2021-01-15 PROCEDURE — 71045 X-RAY EXAM CHEST 1 VIEW: CPT | Mod: 26

## 2021-01-15 RX ORDER — ACETAMINOPHEN 500 MG
650 TABLET ORAL ONCE
Refills: 0 | Status: COMPLETED | OUTPATIENT
Start: 2021-01-15 | End: 2021-01-15

## 2021-01-15 RX ADMIN — Medication 650 MILLIGRAM(S): at 10:27

## 2021-01-15 NOTE — ED PROVIDER NOTE - ATTENDING CONTRIBUTION TO CARE
85y female no blood thinners with eyebrow lac s/p mechanical trip and fall striking head no loc no neck pain +mild worsening of chronic right knee pain on exam vital signs appreciated, well appearing with laceration as above, perrla, no ctls ttp right kne with minimal swelling and ttp over proximal tibia lateral to patella, FROM joint stable extensor intact NVI, imaging reviewed, will d/c suture removal 5d. Patient counseled regarding conditions which should prompt return.

## 2021-01-15 NOTE — ED PROVIDER NOTE - PHYSICAL EXAMINATION
CONSTITUTIONAL: Well-developed; well-nourished; in no acute distress. gcs 15, speaking in full sentences, moving all extremities  SKIN: warm, dry  HEAD: + 2 cm laceration above R forehead   EYES: PERRL, EOMI, no conjunctival erythema  ENT: No nasal discharge; airway clear, mucous membranes moist  CARD: +S1, S2 no murmurs, gallops, or rubs. Regular rate and rhythm. radial 2+ b/l, no chest wall tenderness or crepitus  RESP: No wheezes, rales or rhonchi. CTABL  ABD: soft ntnd, no rebound, no guarding, no rigidity  FAST negative  EXT: moves all extremities,  No clubbing, cyanosis or edema. + R knee w/ pain patch ( pt has hx of chronic knee pain and torn meniscus )   NEURO: Alert, oriented, grossly unremarkable, follows commands  PSYCH: Cooperative, appropriate.

## 2021-01-15 NOTE — ED ADULT TRIAGE NOTE - CHIEF COMPLAINT QUOTE
BIBA as per pt "I was getting into a cab, lost my footing, hit my head getting out and fell". Denies LOC and is on a baby aspirin daily

## 2021-01-15 NOTE — ED PROVIDER NOTE - NSFOLLOWUPINSTRUCTIONS_ED_ALL_ED_FT
Please return in 5 days to have stitches removed     Laceration    A laceration is a cut that goes through all of the layers of the skin and into the tissue that is right under the skin. Some lacerations heal on their own. Others need to be closed with skin adhesive strips, skin glue, stitches (sutures), or staples. Proper laceration care minimizes the risk of infection and helps the laceration to heal better.  If non-absorbable stitches or staples have been placed, they must be taken out within the time frame instructed by your healthcare provider.    SEEK IMMEDIATE MEDICAL CARE IF YOU HAVE ANY OF THE FOLLOWING SYMPTOMS: swelling around the wound, worsening pain, drainage from the wound, red streaking going away from your wound, inability to move finger or toe near the laceration, or discoloration of skin near the laceration.

## 2021-01-15 NOTE — ED PROVIDER NOTE - CARE PROVIDER_API CALL
Monique Patrick  Internal Medicine  [address], [address]  [place] [address]  Phone: [phone]  Fax: [phone]  Established Patient  Follow Up Time: Routine

## 2021-01-15 NOTE — ED PROVIDER NOTE - OBJECTIVE STATEMENT
Pt is an 86 yo F w/ pmh of HTN , remote Breast CA, Acid Reflux presenting w/ mechanical fall. pt reports she was trying to get out of a taxi when her foot got caught and she tripped and fell on her R side. Denies any LOC , n/v/ , blurred vision. Pt endorses HA but reports she had a headache prior to the fall but its slightly worse now.

## 2021-01-15 NOTE — ED ADULT NURSE NOTE - NSIMPLEMENTINTERV_GEN_ALL_ED
Implemented All Fall Risk Interventions:  [place] to call system. Call bell, personal items and telephone within reach. Instruct patient to call for assistance. Room bathroom lighting operational. Non-slip footwear when patient is off stretcher. Physically safe environment: no spills, clutter or unnecessary equipment. Stretcher in lowest position, wheels locked, appropriate side rails in place. Provide visual cue, wrist band, yellow gown, etc. Monitor gait and stability. Monitor for mental status changes and reorient to person, place, and time. Review medications for side effects contributing to fall risk. Reinforce activity limits and safety measures with patient and family.

## 2021-01-15 NOTE — ED ADULT NURSE NOTE - NS ED NURSE DISCH DISPOSITION
HPI-Cardiology


Cardiology Consultation


Date of Consultation


21


Date of Admission





Time Seen by Provider:  09:00


Indication:  Syncope





HPI


Patient is a 60 y/o female with history of CHF, HTN, CKD, DM. Presented to the 

ER with syncopal episode. Patient reports she was sitting at her kitchen table 

yesterday, began to feel nauseated with dizziness, got up to go to couch and had

syncopal episode. Reports she felt better after eating/drinking. Patient reports

she also had syncopal episode last week with seizure like activity while sitting

in her car smoking a cigarette. Currently complaining of some fatigue.  Denies 

any chest pain, dyspnea or further episode of dizziness/lightheadedness.





Home Medications & Allergies


Allergies:  


Coded Allergies:  


     morphine (Verified  Allergy, Mild, Vomiting, 20)


     orange juice (Verified  Allergy, Mild, Nausea, 20)


     Sulfa (Sulfonamide Antibiotics) (Unverified  Allergy, Unknown, 6/25/15)


     codeine (Verified  Allergy, Unknown, TAKES ULTRAM AT HOME, 09)


     ketorolac (Verified  Allergy, Unknown, 08)


     tramadol (Verified  Allergy, Unknown, 11)


Home Medication List Reviewed:  Yes





PMH-Social-Family Hx


Patient Social History


Alcohol Use:  Rarely Uses


Recreational Drug Use:  Yes


Drug of Choice:  METH, THC


Smoking Status:  Current Everyday Smoker


Type Used:  Cigarettes


2nd Hand Smoke Exposure:  No


Recent Foreign Travel:  No


Recent Infectious Disease Expo:  No


Recent Hopitalizations:  No


Physical Abuse Screen:  No


Sexual Abuse:  No





Immunizations Up To Date


Tetanus Booster (TDap):  Unknown


Date of Pneumonia Vaccine:  Oct 1, 2012


Date of Influenza Vaccine:  Oct 1, 2012





Past Medical History


CHF, HTN, HLP, DM





Family Medical History


Significant Family History:  Heart Disease, Cancer, Diabetes


Family History:  


FH: thyroid cancer


  G8 SISTER


FHx: macular degeneration


  19 MOTHER


Glaucoma


  G8 BROTHER





Review of Systems-General


Review of Systems


Constitutional:  see HPI; No chills; dizziness; No fever; weakness, weight loss


EENTM:  blurred vision


Respiratory:  see HPI; No cough, No dyspnea on exertion, No hemoptysis


Cardiovascular:  see HPI; No chest pain, No edema, No Hx of Intervention, No 

palpitations; syncope; No vascular heart diseas


Gastrointestinal:  No abdominal pain, No constipation, No diarrhea, No nausea, 

No vomiting


Genitourinary:  No dysuria, No hematuria


Musculoskeletal:  muscle pain (complains of pain along her L shin r/t SCD's); No

muscle stiffness, No muscle cramps


Skin:  No change in color, No lesions, No pruritus, No rash


Psychiatric/Neurological:  Anxiety, Headache, Numbness (BUE, BLE), Paresthesia 

(BUE, BLE)





Reviewed Test Results


Reviewed Test Results


Lab


Laboratory Tests


1/10/21 12:26: Glucometer 304H


1/10/21 12:42: 


Urine Color YELLOW, Urine Clarity CLEAR, Urine pH 6.0, Urine Specific Gravity 

1.015L, Urine Protein 2+H, Urine Glucose (UA) 2+H, Urine Ketones NEGATIVE, Urine

Nitrite NEGATIVE, Urine Bilirubin NEGATIVE, Urine Urobilinogen 0.2, Urine 

Leukocyte Esterase NEGATIVE, Urine RBC (Auto) TRACE-I, Urine RBC 2-5H, Urine WBC

2-5, Urine Squamous Epithelial Cells , Urine Renal Epithelial Cells RARE, Urine 

Crystals NONE, Urine Bacteria FEWH, Urine Casts NONE, Urine Mucus NEGATIVE, 

Urine Culture Indicated NO


1/10/21 13:19: Glucometer 185H


1/10/21 14:01: 


Sodium Level 135, Potassium Level 3.5L, Chloride Level 94L, Carbon Dioxide Level

24, Anion Gap 17H, Blood Urea Nitrogen 96H, Creatinine 3.43#H, Estimat 

Glomerular Filtration Rate 14, BUN/Creatinine Ratio 28, Glucose Level 144H, 

Calcium Level 8.5


1/10/21 19:44: Glucometer 573*H


21 01:07: Glucometer 286H


21 04:41: Glucometer 60*L


21 05:23: 


Sodium Level 137, Potassium Level 3.5L, Chloride Level 96L, Carbon Dioxide Level

23, Anion Gap 18H, Blood Urea Nitrogen 89H, Creatinine 3.23H, Estimat Glomerular

Filtration Rate 15, BUN/Creatinine Ratio 28, Glucose Level 111H, Calcium Level 

8.9, Phosphorus Level 4.9H, Magnesium Level 1.8





ECG Impression


ECG


Initial ECG Rhythm:  Normal Sinus





Physical Exam


Physical Exam


Vital Signs





Vital Signs - First Documented








 1/10/21





 10:16


 


Temp 35.0


 


Pulse 76


 


Resp 18


 


B/P (MAP) 107/71 (83)


 


Pulse Ox 98


 


O2 Delivery Nasal Cannula


 


O2 Flow Rate 2.00





Capillary Refill : Less Than 3 SecondsLess Than 3 Seconds


Height, Weight, BMI


Height: 5'5.00"


Weight: 151lbs. 1.0oz. 68.634431ut; 29.16 BMI


Method:Stated


General Appearance:  No Apparent Distress, Chronically ill


HEENT:  PERRL/EOMI, Normal ENT Inspection, Other (missing multiple teeth)


Neck:  Full Range of Motion, Normal Inspection, Non Tender, Supple


Respiratory:  Chest Non Tender, Lungs Clear, Normal Breath Sounds, No Accessory 

Muscle Use, No Respiratory Distress


Cardiovascular:  Regular Rate, Rhythm, No Edema, No Gallop, No JVD, No Murmur, 

Normal Peripheral Pulses


Gastrointestinal:  Normal Bowel Sounds, Non Tender, Soft


Rectal:  Deferred


Back:  Normal Inspection, No CVA Tenderness, No Vertebral Tenderness


Extremity:  Normal Capillary Refill, Normal Inspection, Normal Range of Motion, 

Non Tender


Neurologic/Psychiatric:  Alert, Oriented x3, Normal Mood/Affect, Sensory Deficit

(impaired sensation BUE/BLE)


Skin:  Normal Color, Warm/Dry


Lymphatic:  No Adenopathy





A/P-Cardiology


Admission Diagnosis


Syncope


CHF


HTN


HLP





Assessment/Plan


Syncope, unknown etiology. Had syncopal episode yesterday while walking to 

couch, reports similar episode last week while smoking cigarette in car with 

seizure like activity. I will continue on telemetry and continue to monitor. 

Monitor BP





CHF, follows with Dr. Anderson. Maintained on beta blocker. Unable to tolerate ACE-

I/ARB secondary to CKD





HTN, borderline hypotensive, continue to monitor. 





HLP, continue to monitor as outpatient. 





Acute on CKD, follows with nephrology





DM, poorly controlled. 





Tobaccoism, educated on the importance of smoking cessation. 








Thank you for allowing us to participate in the management of Ms Kelly. This is

Monique White PA-C, as a scribe for Dr. South.


Patient was seen and evaluated with Monique, examination performed, management 

plan was discussed, agree with the current scribed note, I made few changes to 

the note using Italic font


Patient was seen and evaluated, feeling better at this time, I instructed her on

increasing fluid intake, I will evaluate 2-D echocardiogram evaluate left 

ventricular function


Patient has been following with Dr. Anderson  Reporting history of heart failure.  

Evaluate 2-D echocardiogram to evaluate the amount of fluid I can recommend


Acute renal failure on chronic renal insufficiency, need IV fluid





Clinical Quality Measures


DVT/VTE Risk/Contraindication:


Risk Factor Score Per Nursin


RFS Level Per Nursing on Admit:  4+=Very High











MONIQUE LIANG 2021 11:08 am


PEDRO LUIS SOUTH MD             2021 5:08 pm
Discharged

## 2021-01-15 NOTE — ED PROVIDER NOTE - CARE PLAN
[de-identified] : The patient arrived for a follow up. he has history of diverticulitis. He was evaluated in the past and was recommended to have a colonoscopy.Due to the pandemic, it was postponed.The patient is doing much better. He is passing bowel movement on a daily basis. However he has been having vague abdominal tenderness.Recent CT abdomen has been unremarkable. Principal Discharge DX:	CHI (closed head injury)  Secondary Diagnosis:	Laceration of face

## 2021-01-15 NOTE — ED PROVIDER NOTE - PATIENT PORTAL LINK FT
You can access the FollowMyHealth Patient Portal offered by [facility] by registering at the following website: [email or web address]. By joining [employer]’s FollowMyHealth portal, you will also be able to view your health information using other applications (apps) compatible with our system.

## 2021-01-20 ENCOUNTER — EMERGENCY (EMERGENCY)
Facility: HOSPITAL | Age: 86
LOS: 0 days | Discharge: HOME | End: 2021-01-20
Attending: EMERGENCY MEDICINE | Admitting: EMERGENCY MEDICINE
Payer: MEDICARE

## 2021-01-20 VITALS
RESPIRATION RATE: 18 BRPM | TEMPERATURE: 98 F | HEIGHT: 60 IN | WEIGHT: 147.93 LBS | SYSTOLIC BLOOD PRESSURE: 156 MMHG | HEART RATE: 77 BPM | OXYGEN SATURATION: 97 % | DIASTOLIC BLOOD PRESSURE: 73 MMHG
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
## 2021-01-20 DIAGNOSIS — Z79.899 OTHER LONG TERM (CURRENT) DRUG THERAPY: ICD-10-CM

## 2021-01-20 DIAGNOSIS — S01.81XD LACERATION WITHOUT FOREIGN BODY OF OTHER PART OF HEAD, SUBSEQUENT ENCOUNTER: ICD-10-CM

## 2021-01-20 DIAGNOSIS — Z88.2 ALLERGY STATUS TO SULFONAMIDES: ICD-10-CM

## 2021-01-20 DIAGNOSIS — I10 ESSENTIAL (PRIMARY) HYPERTENSION: ICD-10-CM

## 2021-01-20 DIAGNOSIS — K21.9 GASTRO-ESOPHAGEAL REFLUX DISEASE WITHOUT ESOPHAGITIS: ICD-10-CM

## 2021-01-20 DIAGNOSIS — Z88.0 ALLERGY STATUS TO PENICILLIN: ICD-10-CM

## 2021-01-20 DIAGNOSIS — Z87.19 PERSONAL HISTORY OF OTHER DISEASES OF THE DIGESTIVE SYSTEM: ICD-10-CM

## 2021-01-20 DIAGNOSIS — Z85.3 PERSONAL HISTORY OF MALIGNANT NEOPLASM OF BREAST: ICD-10-CM

## 2021-01-20 DIAGNOSIS — Z88.6 ALLERGY STATUS TO ANALGESIC AGENT: ICD-10-CM

## 2021-01-20 PROCEDURE — 99282 EMERGENCY DEPT VISIT SF MDM: CPT

## 2021-01-20 NOTE — ED PROVIDER NOTE - CLINICAL SUMMARY MEDICAL DECISION MAKING FREE TEXT BOX
wound eval, 6 sutures 3 cm lac. wound still does not appear completely healed. will wait for 2-3 more days to remove sutures.

## 2021-01-20 NOTE — ED ADULT NURSE NOTE - NSIMPLEMENTINTERV_GEN_ALL_ED
Implemented All Universal Safety Interventions:  [place] to call system. Call bell, personal items and telephone within reach. Instruct patient to call for assistance. Room bathroom lighting operational. Non-slip footwear when patient is off stretcher. Physically safe environment: no spills, clutter or unnecessary equipment. Stretcher in lowest position, wheels locked, appropriate side rails in place.

## 2021-01-20 NOTE — ED PROVIDER NOTE - PATIENT PORTAL LINK FT
You can access the FollowMyHealth Patient Portal offered by [facility] by registering at the following website: [email or web address]. By joining [employer]’s FollowMyHealth portal, you will also be able to view your health information using other applications (apps) compatible with our system.

## 2021-01-25 ENCOUNTER — EMERGENCY (EMERGENCY)
Facility: HOSPITAL | Age: 86
LOS: 0 days | Discharge: HOME | End: 2021-01-25
Attending: EMERGENCY MEDICINE | Admitting: EMERGENCY MEDICINE
Payer: MEDICARE

## 2021-01-25 VITALS
SYSTOLIC BLOOD PRESSURE: 150 MMHG | HEART RATE: 74 BPM | HEIGHT: 60 IN | TEMPERATURE: 97 F | DIASTOLIC BLOOD PRESSURE: 72 MMHG | WEIGHT: 149.91 LBS | RESPIRATION RATE: 18 BRPM | OXYGEN SATURATION: 98 %

## 2021-01-25 DIAGNOSIS — Z48.02 ENCOUNTER FOR REMOVAL OF SUTURES: ICD-10-CM

## 2021-01-25 DIAGNOSIS — I10 ESSENTIAL (PRIMARY) HYPERTENSION: ICD-10-CM

## 2021-01-25 DIAGNOSIS — S01.81XD LACERATION WITHOUT FOREIGN BODY OF OTHER PART OF HEAD, SUBSEQUENT ENCOUNTER: ICD-10-CM
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
## 2021-01-25 DIAGNOSIS — Z88.0 ALLERGY STATUS TO PENICILLIN: ICD-10-CM

## 2021-01-25 DIAGNOSIS — Z88.5 ALLERGY STATUS TO NARCOTIC AGENT: ICD-10-CM

## 2021-01-25 DIAGNOSIS — K21.9 GASTRO-ESOPHAGEAL REFLUX DISEASE WITHOUT ESOPHAGITIS: ICD-10-CM

## 2021-01-25 DIAGNOSIS — Z88.8 ALLERGY STATUS TO OTHER DRUGS, MEDICAMENTS AND BIOLOGICAL SUBSTANCES STATUS: ICD-10-CM

## 2021-01-25 PROCEDURE — L9995: CPT

## 2021-01-25 NOTE — ED ADULT NURSE NOTE - DISCHARGE DATE/TIME
Mariaa was seen today for abdominal pain.    Diagnoses and all orders for this visit:    Left lower quadrant abdominal pain  -     BASIC METABOLIC PANEL; Future  -     CBC NO DIFFERENTIAL; Future  -     SEDIMENTATION RATE WESTERGREN; Future  -     CT ABDOMEN PELVIS W CONTRAST; Future    Other orders  -     traMADol (ULTRAM) 50 MG tablet; Take 1 tablet by mouth 2 times daily as needed for Pain.      Call  to schedule scan of abdomen       25-Jan-2021 12:53

## 2021-01-25 NOTE — ED PROVIDER NOTE - NS ED ROS FT
Review of Systems:  	•	CONSTITUTIONAL - no fever, no diaphoresis, no chills  	•	SKIN -laceration to right side forehead  	  	•	EYES - no eye pain, no blurry vision  	•	ENT - no change in hearing, no sore throat, no ear pain or tinnitus

## 2021-01-25 NOTE — ED PROVIDER NOTE - PATIENT PORTAL LINK FT
You can access the FollowMyHealth Patient Portal offered by [facility] by registering at the following website: [email or web address]. By joining [email or web address]’s FollowMyHealth portal, you will also be able to view your health information using other applications (apps) compatible with our system.

## 2021-01-25 NOTE — ED PROVIDER NOTE - ATTENDING CONTRIBUTION TO CARE
86 yo F PMHx noted presents for suture removal.  Sutures placed in ED on 1/15.  Feeling good today.  On exam pt in NAD AAO x 3, + sutures in place to right lateral brow, healing bruising, no signs of infection

## 2021-01-25 NOTE — ED PROVIDER NOTE - PHYSICAL EXAMINATION
--EXAM--  VITAL SIGNS: I have reviewed vs documented at present.  CONSTITUTIONAL: Well-developed; well-nourished; in no acute distress.   SKIN: laceration with 6 sutures right forehead well healed no erythema no drainage     EYES: PERRL, EOM intact; conjunctiva and sclera clear. No nystagmus.  ENT: No nasal discharge; airway clear.

## 2021-01-25 NOTE — ED PROVIDER NOTE - OBJECTIVE STATEMENT
this is a 86 yo female who presented to ed for suture removal. patient had laceration repair in ed after a trip and fall. patient no complaints today

## 2021-03-17 ENCOUNTER — APPOINTMENT (OUTPATIENT)
Dept: UROGYNECOLOGY | Facility: CLINIC | Age: 86
End: 2021-03-17
Payer: MEDICARE

## 2021-03-17 ENCOUNTER — OUTPATIENT (OUTPATIENT)
Dept: OUTPATIENT SERVICES | Facility: HOSPITAL | Age: 86
LOS: 1 days | Discharge: HOME | End: 2021-03-17

## 2021-03-17 VITALS
WEIGHT: 148 LBS | HEIGHT: 60 IN | SYSTOLIC BLOOD PRESSURE: 124 MMHG | DIASTOLIC BLOOD PRESSURE: 70 MMHG | BODY MASS INDEX: 29.06 KG/M2

## 2021-03-17 PROCEDURE — 99213 OFFICE O/P EST LOW 20 MIN: CPT

## 2021-03-17 NOTE — DISCUSSION/SUMMARY
[FreeTextEntry1] : Complete Uterovaginal Prolapse:\par Ring and support #8 removed, cleaned, inspected and reinserted. The patient tolerated this well. \par No bleeding, lesions, abnormal discharge were noted. \par The patient will follow up in 3 months for routine pessary management.\par \par \par

## 2021-03-17 NOTE — HISTORY OF PRESENT ILLNESS
[FreeTextEntry1] : Patient is here for routine pessary cleaning for complete uterovaginal prolapse.\par Last seen  12/2/20 for pessary cleaning. ring and support  # 8\par \par H/o Breast cancer\par CKD Cr 1.16 11/18\par \par 3/19: TVUS for postmenopausal bleeding: normal\par \par Patient has intermittent incomplete bladder emptying without reduction. At NP visit on 02/06/19,  cc (normal PVR would be 143 cc or less based on the volume voided).\par On 10/16/19,  cc.\par \par Fitted for: Ring and Support #6 (we did not have the actual pessary in the office)\par \par Today, patient is doing well and has no complaints.\par

## 2021-04-15 ENCOUNTER — LABORATORY RESULT (OUTPATIENT)
Age: 86
End: 2021-04-15

## 2021-04-15 ENCOUNTER — APPOINTMENT (OUTPATIENT)
Dept: HEMATOLOGY ONCOLOGY | Facility: CLINIC | Age: 86
End: 2021-04-15
Payer: MEDICARE

## 2021-04-15 VITALS
DIASTOLIC BLOOD PRESSURE: 72 MMHG | SYSTOLIC BLOOD PRESSURE: 156 MMHG | TEMPERATURE: 97.5 F | WEIGHT: 154 LBS | HEART RATE: 73 BPM | BODY MASS INDEX: 30.23 KG/M2 | RESPIRATION RATE: 14 BRPM | HEIGHT: 60 IN

## 2021-04-15 DIAGNOSIS — H35.30 UNSPECIFIED MACULAR DEGENERATION: ICD-10-CM

## 2021-04-15 LAB
ALBUMIN SERPL ELPH-MCNC: 4.6 G/DL
ALP BLD-CCNC: 94 U/L
ALT SERPL-CCNC: 14 U/L
ANION GAP SERPL CALC-SCNC: 13 MMOL/L
AST SERPL-CCNC: 51 U/L
BILIRUB SERPL-MCNC: 0.3 MG/DL
BUN SERPL-MCNC: 41 MG/DL
CALCIUM SERPL-MCNC: 10.4 MG/DL
CHLORIDE SERPL-SCNC: 101 MMOL/L
CO2 SERPL-SCNC: 24 MMOL/L
CREAT SERPL-MCNC: 1.2 MG/DL
GLUCOSE SERPL-MCNC: 97 MG/DL
HCT VFR BLD CALC: 42 %
HGB BLD-MCNC: 13.4 G/DL
LDH SERPL-CCNC: 184
MCHC RBC-ENTMCNC: 28 PG
MCHC RBC-ENTMCNC: 31.9 G/DL
MCV RBC AUTO: 87.7 FL
PLATELET # BLD AUTO: 295 K/UL
PMV BLD: 10.4 FL
POTASSIUM SERPL-SCNC: 4.9 MMOL/L
PROT SERPL-MCNC: 7.8 G/DL
RBC # BLD: 4.79 M/UL
RBC # FLD: 13 %
SODIUM SERPL-SCNC: 138 MMOL/L
WBC # FLD AUTO: 11.72 K/UL

## 2021-04-15 PROCEDURE — 99213 OFFICE O/P EST LOW 20 MIN: CPT

## 2021-04-15 NOTE — REVIEW OF SYSTEMS
[Recent Change In Weight] : ~T recent weight change [Vision Problems] : vision problems [Loss of Hearing] : loss of hearing [Lower Ext Edema] : lower extremity edema [Incontinence] : incontinence [Joint Pain] : joint pain [Joint Stiffness] : joint stiffness [Skin Wound] : skin wound [Negative] : Psychiatric [FreeTextEntry2] : Has gained some [FreeTextEntry3] : Macular degeneration [de-identified] : From the fall at the right temple area (required stitches).

## 2021-04-15 NOTE — REVIEW OF SYSTEMS
[Recent Change In Weight] : ~T recent weight change [Vision Problems] : vision problems [Loss of Hearing] : loss of hearing [Lower Ext Edema] : lower extremity edema [Incontinence] : incontinence [Joint Pain] : joint pain [Joint Stiffness] : joint stiffness [Skin Wound] : skin wound [Negative] : Psychiatric [FreeTextEntry2] : Has gained some [FreeTextEntry3] : Macular degeneration [de-identified] : From the fall at the right temple area (required stitches).

## 2021-04-15 NOTE — HISTORY OF PRESENT ILLNESS
[Disease: _____________________] : Disease: [unfilled] [de-identified] : The patient is coming for her regularly scheduled yearly follow up.\par Her most recent problems have been some elevation of the AST and she has been followed by her PMD and the gastroenterologist who put her on Ursodial since she was found to have gall stones. She is also followed by her gynecologist for her uterovaginal prolapse. Her pessary is being replaced almost every 3 months according to the patient. \par She has also started seeing a retinal specialist for macular degeneration and is getting injections every 2 months.\par She has some arthralgias which are not new. She is also having peripheral edema every once in a while.\par \par She is due for her mammogram this coming June. She is regularly followed by her gastroenterologist and the gynecologist.

## 2021-04-15 NOTE — ASSESSMENT
[FreeTextEntry1] : 1. History of stage I breast carcinoma, S/P right mastectomy followed by hormonal therapy with tamoxifen.\par 2. 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

## 2021-04-15 NOTE — CONSULT LETTER
[Dear  ___] : Dear  [unfilled], [Courtesy Letter:] : I had the pleasure of seeing your patient, [unfilled], in my office today. [Please see my note below.] : Please see my note below. [Consult Closing:] : Thank you very much for allowing me to participate in the care of this patient.  If you have any questions, please do not hesitate to contact me. [Sincerely,] : Sincerely, [DrGama  ___] : Dr. REYES [FreeTextEntry2] : Dr. REILLY Stallings [FreeTextEntry3] : Dr. HALINA Allison

## 2021-04-15 NOTE — HISTORY OF PRESENT ILLNESS
[Disease: _____________________] : Disease: [unfilled] [de-identified] : The patient is coming for her regularly scheduled yearly follow up.\par Her most recent problems have been some elevation of the AST and she has been followed by her PMD and the gastroenterologist who put her on Ursodial since she was found to have gall stones. She is also followed by her gynecologist for her uterovaginal prolapse. Her pessary is being replaced almost every 3 months according to the patient. \par She has also started seeing a retinal specialist for macular degeneration and is getting injections every 2 months.\par She has some arthralgias which are not new. She is also having peripheral edema every once in a while.\par \par She is due for her mammogram this coming June. She is regularly followed by her gastroenterologist and the gynecologist.

## 2021-04-15 NOTE — PHYSICAL EXAM
[Restricted in physically strenuous activity but ambulatory and able to carry out work of a light or sedentary nature] : Status 1- Restricted in physically strenuous activity but ambulatory and able to carry out work of a light or sedentary nature, e.g., light house work, office work [Normal] : affect appropriate [de-identified] : But somewhat overweight [de-identified] : S/P right mastectomy. No evidence of local recurrence. Left breast is negative for any masses or discharge. [de-identified] : But decreased hearing. Bandaid noted at the ride side of the forehead. [de-identified] : Arthritic changes

## 2021-04-15 NOTE — PHYSICAL EXAM
[Restricted in physically strenuous activity but ambulatory and able to carry out work of a light or sedentary nature] : Status 1- Restricted in physically strenuous activity but ambulatory and able to carry out work of a light or sedentary nature, e.g., light house work, office work [Normal] : affect appropriate [de-identified] : But somewhat overweight [de-identified] : S/P right mastectomy. No evidence of local recurrence. Left breast is negative for any masses or discharge. [de-identified] : But decreased hearing. Bandaid noted at the ride side of the forehead. [de-identified] : Arthritic changes

## 2021-04-26 ENCOUNTER — OUTPATIENT (OUTPATIENT)
Dept: OUTPATIENT SERVICES | Facility: HOSPITAL | Age: 86
LOS: 1 days | Discharge: HOME | End: 2021-04-26

## 2021-04-26 ENCOUNTER — APPOINTMENT (OUTPATIENT)
Dept: HEMATOLOGY ONCOLOGY | Facility: CLINIC | Age: 86
End: 2021-04-26

## 2021-04-26 DIAGNOSIS — Z85.79 PERSONAL HISTORY OF OTHER MALIGNANT NEOPLASMS OF LYMPHOID, HEMATOPOIETIC AND RELATED TISSUES: ICD-10-CM

## 2021-04-26 DIAGNOSIS — Z85.3 PERSONAL HISTORY OF MALIGNANT NEOPLASM OF BREAST: ICD-10-CM

## 2021-04-26 DIAGNOSIS — Z00.00 ENCOUNTER FOR GENERAL ADULT MEDICAL EXAMINATION W/OUT ABNORMAL FINDINGS: ICD-10-CM

## 2021-04-26 LAB
ANION GAP SERPL CALC-SCNC: 13 MMOL/L
BUN SERPL-MCNC: 38 MG/DL
CALCIUM SERPL-MCNC: 10 MG/DL
CHLORIDE SERPL-SCNC: 103 MMOL/L
CO2 SERPL-SCNC: 21 MMOL/L
CREAT SERPL-MCNC: 1.3 MG/DL
GLUCOSE SERPL-MCNC: 97 MG/DL
POTASSIUM SERPL-SCNC: 5.1 MMOL/L
SODIUM SERPL-SCNC: 137 MMOL/L

## 2021-04-27 LAB
CALCIUM SERPL-MCNC: 10.3 MG/DL
PARATHYROID HORMONE INTACT: 32 PG/ML

## 2021-05-10 LAB — PTH RELATED PROT SERPL-MCNC: <2 PMOL/L

## 2021-06-16 ENCOUNTER — APPOINTMENT (OUTPATIENT)
Dept: UROGYNECOLOGY | Facility: CLINIC | Age: 86
End: 2021-06-16
Payer: MEDICARE

## 2021-06-16 ENCOUNTER — OUTPATIENT (OUTPATIENT)
Dept: OUTPATIENT SERVICES | Facility: HOSPITAL | Age: 86
LOS: 1 days | Discharge: HOME | End: 2021-06-16

## 2021-06-16 VITALS
SYSTOLIC BLOOD PRESSURE: 116 MMHG | WEIGHT: 154 LBS | BODY MASS INDEX: 30.23 KG/M2 | HEIGHT: 60 IN | DIASTOLIC BLOOD PRESSURE: 80 MMHG

## 2021-06-16 PROCEDURE — 51702 INSERT TEMP BLADDER CATH: CPT

## 2021-06-16 PROCEDURE — 99214 OFFICE O/P EST MOD 30 MIN: CPT | Mod: 25

## 2021-06-16 NOTE — HISTORY OF PRESENT ILLNESS
[FreeTextEntry1] : Patient is here for routine pessary cleaning for complete uterovaginal prolapse.\par Last seen 3/17/21 for pessary cleaning. ring and support # 8\par \par H/o Breast cancer\par CKD Cr 1.16 11/18\par \par 3/19: TVUS for postmenopausal bleeding: normal\par \par Patient has intermittent incomplete bladder emptying without reduction. At NP visit on 02/06/19,  cc (normal PVR would be 143 cc or less based on the volume voided).\par On 10/16/19,  cc.\par \par Fitted for: Ring and Support #6 (we did not have the actual pessary in the office)\par \par Today, patient is doing well. C/o burning with urination and her urine is cloudy, feels like she has a urine infection.

## 2021-06-16 NOTE — PHYSICAL EXAM
[Chaperone Present] : A chaperone was present in the examining room during all aspects of the physical examination [FreeTextEntry1] : Urethra was prepped in sterile fashion and then a sterile catheter was used by me to drain the bladder.\par cath: 200cc [No Acute Distress] : in no acute distress [Well developed] : well developed [Well Nourished] : ~L well nourished

## 2021-06-16 NOTE — DISCUSSION/SUMMARY
[FreeTextEntry1] : Complete Uterovaginal Prolapse:\par Ring and support # 8 removed, cleaned, inspected and reinserted. The patient tolerated this well. \par No bleeding, lesions, abnormal discharge were noted. \par The patient will follow up in 3 months for routine pessary management.\par \par Dysuria\par Urine culture obtained.\par Will follow up.\par Will treat accordingly if necessary\par Rx Macrobid 100mg BID x 7days\par

## 2021-06-16 NOTE — COUNSELING
[FreeTextEntry1] : Please call the office if you have any issues with vaginal pain, vaginal bleeding, difficulty urinating or having a bowel movement or if the pessary falls out so that we can arrange another size pessary.\par \par Please continue to apply a pea size amount of the cream to the opening of the vagina three nights per week.\par \par Please take prescribed Macrobid 100mg twice a day for 7 days for possible urine infection. We will contact you if the urine results are abnormal.\par \par Please follow up for pessary maintenance in 3 months with GILDARDO Sanchez\par

## 2021-06-19 LAB — BACTERIA UR CULT: ABNORMAL

## 2021-06-21 ENCOUNTER — NON-APPOINTMENT (OUTPATIENT)
Age: 86
End: 2021-06-21

## 2021-06-25 ENCOUNTER — OUTPATIENT (OUTPATIENT)
Dept: OUTPATIENT SERVICES | Facility: HOSPITAL | Age: 86
LOS: 1 days | Discharge: HOME | End: 2021-06-25
Payer: MEDICARE

## 2021-06-25 ENCOUNTER — NON-APPOINTMENT (OUTPATIENT)
Age: 86
End: 2021-06-25

## 2021-06-25 DIAGNOSIS — R92.8 OTHER ABNORMAL AND INCONCLUSIVE FINDINGS ON DIAGNOSTIC IMAGING OF BREAST: ICD-10-CM

## 2021-06-25 PROCEDURE — G0279: CPT | Mod: 26

## 2021-06-25 PROCEDURE — 77065 DX MAMMO INCL CAD UNI: CPT | Mod: 26,LT

## 2021-06-28 ENCOUNTER — NON-APPOINTMENT (OUTPATIENT)
Age: 86
End: 2021-06-28

## 2021-06-29 ENCOUNTER — OUTPATIENT (OUTPATIENT)
Dept: OUTPATIENT SERVICES | Facility: HOSPITAL | Age: 86
LOS: 1 days | Discharge: HOME | End: 2021-06-29

## 2021-06-29 ENCOUNTER — APPOINTMENT (OUTPATIENT)
Dept: UROGYNECOLOGY | Facility: CLINIC | Age: 86
End: 2021-06-29
Payer: MEDICARE

## 2021-06-29 VITALS
SYSTOLIC BLOOD PRESSURE: 120 MMHG | WEIGHT: 154 LBS | HEIGHT: 60 IN | DIASTOLIC BLOOD PRESSURE: 72 MMHG | BODY MASS INDEX: 30.23 KG/M2

## 2021-06-29 PROCEDURE — 51702 INSERT TEMP BLADDER CATH: CPT

## 2021-06-29 PROCEDURE — 99213 OFFICE O/P EST LOW 20 MIN: CPT | Mod: 25

## 2021-06-29 NOTE — HISTORY OF PRESENT ILLNESS
[FreeTextEntry1] : Patient is here today for cath specimen. \par Last seen on 6/16/21 for pessary cleaning ring and support # 8 \par \par H/o Breast cancer\par CKD Cr 1.16 11/18\par \par 3/19: TVUS for postmenopausal bleeding: normal\par \par Patient has intermittent incomplete bladder emptying without reduction. At NP visit on 02/06/19,  cc (normal PVR would be 143 cc or less based on the volume voided).\par On 10/16/19,  cc.\par \par Fitted for: Ring and Support #6 (we did not have the actual pessary in the office)\par \par Today, patient is doing well. She finished Macrobid but still has burning with urination and her urine is cloudy, feels like she has a urine infection. Fosfomycin was prescribed but not approved by her insurance. Pt wanted to to check if she still has infection before taking another antibiotic.

## 2021-06-29 NOTE — DISCUSSION/SUMMARY
[FreeTextEntry1] : Dysuria\par Urine culture obtained.\par Will follow up.\par Will treat accordingly if necessary\par \par Follow up per scheduled pessary cleaning appt.

## 2021-06-29 NOTE — COUNSELING
[FreeTextEntry1] : We will contact you if the urine results are abnormal.\par \par Please keep your scheduled appointment for 9/29/21 for pessary cleaning. \par

## 2021-06-29 NOTE — PHYSICAL EXAM
[Chaperone Present] : A chaperone was present in the examining room during all aspects of the physical examination [FreeTextEntry1] : Urethra was prepped in sterile fashion and then a sterile catheter was used by me to drain the bladder.\par cath: 180cc [No Acute Distress] : in no acute distress [Well developed] : well developed [Well Nourished] : ~L well nourished

## 2021-07-01 LAB — BACTERIA UR CULT: NORMAL

## 2021-09-22 ENCOUNTER — APPOINTMENT (OUTPATIENT)
Dept: UROGYNECOLOGY | Facility: CLINIC | Age: 86
End: 2021-09-22
Payer: MEDICARE

## 2021-09-22 ENCOUNTER — OUTPATIENT (OUTPATIENT)
Dept: OUTPATIENT SERVICES | Facility: HOSPITAL | Age: 86
LOS: 1 days | Discharge: HOME | End: 2021-09-22

## 2021-09-22 VITALS — DIASTOLIC BLOOD PRESSURE: 80 MMHG | SYSTOLIC BLOOD PRESSURE: 130 MMHG | BODY MASS INDEX: 31.83 KG/M2 | WEIGHT: 163 LBS

## 2021-09-22 DIAGNOSIS — R30.0 DYSURIA: ICD-10-CM

## 2021-09-22 PROCEDURE — 99214 OFFICE O/P EST MOD 30 MIN: CPT | Mod: 25

## 2021-09-22 PROCEDURE — 51702 INSERT TEMP BLADDER CATH: CPT

## 2021-09-22 RX ORDER — FOSFOMYCIN TROMETHAMINE 3 G/1
3 POWDER ORAL
Qty: 1 | Refills: 0 | Status: DISCONTINUED | COMMUNITY
Start: 2021-06-25 | End: 2021-09-22

## 2021-09-22 NOTE — DISCUSSION/SUMMARY
[FreeTextEntry1] : Complete Uterovaginal Prolapse:\par Ring and support # 8 removed, cleaned, inspected and reinserted. The patient tolerated this well. \par No bleeding, lesions, abnormal discharge were noted. \par The patient will follow up in 3 months for routine pessary management.\par \par Dysuria\par Urine culture obtained.\par Will follow up.\par Will treat accordingly if necessary\par

## 2021-09-22 NOTE — HISTORY OF PRESENT ILLNESS
[FreeTextEntry1] : Patient is here for routine pessary cleaning for complete uterovaginal prolapse.\par Last seen 6/29/21 for cath specimen.   \par \par H/o Breast cancer\par CKD Cr 1.16 11/18\par \par 3/19: TVUS for postmenopausal bleeding: normal\par \par Patient has intermittent incomplete bladder emptying without reduction. At NP visit on 02/06/19,  cc (normal PVR would be 143 cc or less based on the volume voided).\par On 10/16/19,  cc.\par \par Fitted for: Ring and Support #6 (we did not have the actual pessary in the office)\par \par 6/16/21: +UTI: >100K E Coli, Resistant to: Ampicillin, Amp/Sulbactam, Cipro, Levaquin, Bactrim\par \par Today, patient is doing well. Started to feel burning with urination few weeks ago. Would like to be checked for infection.

## 2021-09-22 NOTE — PHYSICAL EXAM
[Chaperone Present] : A chaperone was present in the examining room during all aspects of the physical examination [No Acute Distress] : in no acute distress [Well developed] : well developed [Well Nourished] : ~L well nourished [FreeTextEntry1] : Urethra was prepped in sterile fashion and then a sterile catheter was used by me to drain the bladder.\par cath: 90cc

## 2021-09-27 ENCOUNTER — NON-APPOINTMENT (OUTPATIENT)
Age: 86
End: 2021-09-27

## 2021-09-28 RX ORDER — NITROFURANTOIN (MONOHYDRATE/MACROCRYSTALS) 25; 75 MG/1; MG/1
100 CAPSULE ORAL TWICE DAILY
Qty: 14 | Refills: 0 | Status: DISCONTINUED | COMMUNITY
Start: 2021-06-16 | End: 2021-09-28

## 2021-09-29 ENCOUNTER — APPOINTMENT (OUTPATIENT)
Dept: UROGYNECOLOGY | Facility: CLINIC | Age: 86
End: 2021-09-29

## 2021-09-29 ENCOUNTER — NON-APPOINTMENT (OUTPATIENT)
Age: 86
End: 2021-09-29

## 2021-10-14 ENCOUNTER — NON-APPOINTMENT (OUTPATIENT)
Age: 86
End: 2021-10-14

## 2021-12-22 ENCOUNTER — APPOINTMENT (OUTPATIENT)
Dept: UROGYNECOLOGY | Facility: CLINIC | Age: 86
End: 2021-12-22
Payer: MEDICARE

## 2021-12-22 ENCOUNTER — OUTPATIENT (OUTPATIENT)
Dept: OUTPATIENT SERVICES | Facility: HOSPITAL | Age: 86
LOS: 1 days | Discharge: HOME | End: 2021-12-22

## 2021-12-22 VITALS — DIASTOLIC BLOOD PRESSURE: 80 MMHG | SYSTOLIC BLOOD PRESSURE: 125 MMHG | WEIGHT: 160 LBS | BODY MASS INDEX: 31.25 KG/M2

## 2021-12-22 PROCEDURE — 99214 OFFICE O/P EST MOD 30 MIN: CPT

## 2021-12-22 NOTE — COUNSELING
[FreeTextEntry1] : Please call the office if you have any issues with vaginal pain, vaginal bleeding, difficulty urinating or having a bowel movement or if the pessary falls out so that we can arrange another size pessary.\par \par Please continue to take Macrobid 100mg once a day for UTI suppression. \par \par Please follow up for pessary maintenance in 3 months with GILDARDO Sanchez\par

## 2021-12-22 NOTE — HISTORY OF PRESENT ILLNESS
[FreeTextEntry1] : Patient is here for routine pessary cleaning for complete uterovaginal prolapse.\par Last seen 9/22/21 for pessary cleaning.  ring and support # 8\par \par H/o Breast cancer\par H/o Lymphoma\par CKD Cr 1.16 11/18\par \par 3/19: TVUS for postmenopausal bleeding: normal\par \par Patient has intermittent incomplete bladder emptying without reduction. At NP visit on 02/06/19,  cc (normal PVR would be 143 cc or less based on the volume voided).\par On 10/16/19,  cc.\par \par Fitted for: Ring and Support #6 (we did not have the actual pessary in the office)\par \par 6/16/21: +UTI: >100K E Coli, Resistant to: Ampicillin, Amp/Sulbactam, Cipro, Levaquin, Bactrim\par 9/22/21, +UTI, >100K E Coli, Resistant to Ampicillin, Amp/Sulbactam, Cipro, Levaquin, Bactrim, Treated with Macrobid 100mg BID x 7 days\par \par Recurrent UTI diagnosis\par Pt did BMP but does not want to do MRI or cysto. Does not want to use estrogen cream due to h/o Breast cancer\par \par Macorbid 100mg QD for suppression since 9/27/21\par \par 10/22/21: BUN/Cr: 30/1.7\par \par Today, patient is doing well and has no complaints. Feels well with taking Macrobid 100mg QD, has not had any infections since then. Insists that she does not feel comfortable with doing MRI or cysto. Feels that she does not need to do that. \par

## 2021-12-22 NOTE — DISCUSSION/SUMMARY
[FreeTextEntry1] : Complete Uterovaginal Prolapse:\par Ring and support # 8 removed, cleaned, inspected and reinserted. The patient tolerated this well. \par Suggested to try smaller pessary next time, pt does not want to try. \par No bleeding, lesions, abnormal discharge were noted. \par The patient will follow up in 3 months for routine pessary management.\par \par Recurrent UTIs\par Cont Macrobid 100mg QD for suppression, another 3 months, refills given

## 2022-02-02 ENCOUNTER — APPOINTMENT (OUTPATIENT)
Dept: UROGYNECOLOGY | Facility: CLINIC | Age: 87
End: 2022-02-02

## 2022-03-23 ENCOUNTER — APPOINTMENT (OUTPATIENT)
Dept: UROGYNECOLOGY | Facility: CLINIC | Age: 87
End: 2022-03-23
Payer: MEDICARE

## 2022-03-23 ENCOUNTER — OUTPATIENT (OUTPATIENT)
Dept: OUTPATIENT SERVICES | Facility: HOSPITAL | Age: 87
LOS: 1 days | Discharge: HOME | End: 2022-03-23

## 2022-03-23 VITALS
SYSTOLIC BLOOD PRESSURE: 130 MMHG | DIASTOLIC BLOOD PRESSURE: 70 MMHG | BODY MASS INDEX: 30.85 KG/M2 | WEIGHT: 157.13 LBS | HEIGHT: 60 IN

## 2022-03-23 PROCEDURE — 99213 OFFICE O/P EST LOW 20 MIN: CPT

## 2022-03-23 NOTE — DISCUSSION/SUMMARY
[FreeTextEntry1] : \par Complete Uterovaginal Prolapse:\par Ring and Support #8 removed, cleaned, inspected and reinserted. The patient tolerated this well. \par No bleeding, lesions, abnormal discharge were noted. \par The patient will follow up in 3 months for routine pessary management.\par \par

## 2022-03-23 NOTE — COUNSELING
[FreeTextEntry1] : \par Please call the office if you have any issues with vaginal pain, vaginal bleeding, difficulty urinating or having a bowel movement or if the pessary falls out so that we can arrange another size pessary.\par \par Please follow up for pessary maintenance in 3 months

## 2022-03-23 NOTE — HISTORY OF PRESENT ILLNESS
[FreeTextEntry1] : \par Patient is here for routine pessary cleaning for complete uterovaginal prolapse.\par Last seen 12/22/2021 for pessary cleaning. Ring and Support #8\par \par H/o Breast cancer\par H/o Lymphoma\par CKD Cr 1.16 11/18\par \par 3/19: TVUS for postmenopausal bleeding: normal\par \par Patient has intermittent incomplete bladder emptying without reduction. At NP visit on 02/06/19,  cc (normal PVR would be 143 cc or less based on the volume voided).\par On 10/16/19,  cc.\par \par Fitted for: Ring and Support #6 (we did not have the actual pessary in the office)\par \par 6/16/21: +UTI: >100K E Coli, Resistant to: Ampicillin, Amp/Sulbactam, Cipro, Levaquin, Bactrim\par 9/22/21, +UTI, >100K E Coli, Resistant to Ampicillin, Amp/Sulbactam, Cipro, Levaquin, Bactrim, Treated with Macrobid 100mg BID x 7 days\par \par Recurrent UTI diagnosis\par Pt did BMP but does not want to do MRI or cysto. Does not want to use estrogen cream due to h/o Breast cancer\par \par Macorbid 100mg QD for suppression since 9/27/21\par \par 10/22/21: BUN/Cr: 30/1.7\par \par Today, patient is doing well and has no complaints.

## 2022-04-21 ENCOUNTER — APPOINTMENT (OUTPATIENT)
Dept: HEMATOLOGY ONCOLOGY | Facility: CLINIC | Age: 87
End: 2022-04-21

## 2022-05-10 NOTE — ED PROCEDURE NOTE - CPROC ED SUTURE EQUIP USED1
#NAME? Date Pre Breakfast Post Breakfast Pre Lunch Post Lunch Pre Dinner Post Dinner Bedtime After Exercise Overall    5/1/2022 166  227  162  228      5/2/2022 136  102  78  162      5/3/2022 166  182  88  183      5/4/2022 152  236  98  191      5/5/2022 164  169  132  187      5/6/2022 160  380  373  186      5/7/2022 202  368  278  294      5/8/2022 186            5/9/2022             5/10/2022             5/11/2022             5/12/2022             5/13/2022             5/14/2022             5/15/2022               Post Breakfast Pre LunchPost lunch Pre Dinner Post Dinner Bedtime After exercise Overall    Average 167 #DIV/0! 238 #DIV/0! 173 #DIV/0! 204 #DIV/0! 194    Number of tests 7 0 7 0 7 0 7 0 29    Minimum 136 0 102 0 78 0 162 0 0    Maximum 202 0 380 0 373 0 294 0 0    Below Target 0 0 0 0 0 0 0 0 0    Within Target 1 0 1 0 4 0 0 0 6    Above target 6 0 6 0 3 0 7 0 22    Goal Range 70 to  140          Result range 78 to  380                                           <=70 >=140                                Current medication list:     Humulin 70/30 35 units daily (8 am)  Insulin Aspart 3 units three times a day 0800, 1200, 1700   scapel/gloves/forceps

## 2022-05-16 ENCOUNTER — OUTPATIENT (OUTPATIENT)
Dept: OUTPATIENT SERVICES | Facility: HOSPITAL | Age: 87
LOS: 1 days | Discharge: HOME | End: 2022-05-16

## 2022-05-16 ENCOUNTER — LABORATORY RESULT (OUTPATIENT)
Age: 87
End: 2022-05-16

## 2022-05-16 ENCOUNTER — APPOINTMENT (OUTPATIENT)
Dept: HEMATOLOGY ONCOLOGY | Facility: CLINIC | Age: 87
End: 2022-05-16
Payer: MEDICARE

## 2022-05-16 VITALS
HEIGHT: 60 IN | TEMPERATURE: 98.7 F | WEIGHT: 158 LBS | HEART RATE: 68 BPM | BODY MASS INDEX: 31.02 KG/M2 | RESPIRATION RATE: 16 BRPM | SYSTOLIC BLOOD PRESSURE: 130 MMHG | DIASTOLIC BLOOD PRESSURE: 76 MMHG

## 2022-05-16 DIAGNOSIS — E78.00 PURE HYPERCHOLESTEROLEMIA, UNSPECIFIED: ICD-10-CM

## 2022-05-16 LAB
ALBUMIN SERPL ELPH-MCNC: 4.5 G/DL
ALP BLD-CCNC: 88 U/L
ALT SERPL-CCNC: 46 U/L
ANION GAP SERPL CALC-SCNC: 17 MMOL/L
AST SERPL-CCNC: 73 U/L
BILIRUB SERPL-MCNC: 0.3 MG/DL
BUN SERPL-MCNC: 36 MG/DL
CALCIUM SERPL-MCNC: 10.3 MG/DL
CHLORIDE SERPL-SCNC: 106 MMOL/L
CO2 SERPL-SCNC: 21 MMOL/L
CREAT SERPL-MCNC: 1.2 MG/DL
EGFR: 45 ML/MIN/1.73M2
GLUCOSE SERPL-MCNC: 94 MG/DL
HCT VFR BLD CALC: 40.9 %
HGB BLD-MCNC: 13.5 G/DL
LDH SERPL-CCNC: 312
MCHC RBC-ENTMCNC: 29.5 PG
MCHC RBC-ENTMCNC: 33 G/DL
MCV RBC AUTO: 89.5 FL
PLATELET # BLD AUTO: 262 K/UL
PMV BLD: 10.2 FL
POTASSIUM SERPL-SCNC: 5 MMOL/L
PROT SERPL-MCNC: 7.9 G/DL
RBC # BLD: 4.57 M/UL
RBC # FLD: 13.2 %
SODIUM SERPL-SCNC: 144 MMOL/L
WBC # FLD AUTO: 10.16 K/UL

## 2022-05-16 PROCEDURE — 99213 OFFICE O/P EST LOW 20 MIN: CPT

## 2022-05-16 NOTE — REVIEW OF SYSTEMS
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 On Macrobid

## 2022-05-16 NOTE — PHYSICAL EXAM
[Restricted in physically strenuous activity but ambulatory and able to carry out work of a light or sedentary nature] : Status 1- Restricted in physically strenuous activity but ambulatory and able to carry out work of a light or sedentary nature, e.g., light house work, office work [Obese] : obese [Normal] : affect appropriate [de-identified] : S/P right mastectomy, no evidence of local recurrence. The left breast is negative for any masses, discharge or nipple abnormality. [de-identified] : Arthritic changes

## 2022-05-16 NOTE — HISTORY OF PRESENT ILLNESS
[Disease:__________________________] : Disease: [unfilled] [de-identified] : The patient is coming for her regularly scheduled yearly follow up for her history of DLBCL (stage II, treated with chemotherapy)  and breast cancer (this latter in 2004 treated with chemotherapy and hormonal therapy). \par At present, she just has usual aches and pains.\par She is followed regularly by her gynecologist for her uterine and bladder prolapse.\par Otherwise, she has no particular new problems.

## 2022-05-16 NOTE — ASSESSMENT
[FreeTextEntry1] : 1. DLBCL stage II at the time of diagnosis, S/P chemotherapy, clinically no evidence of recurrence, now several years later.\par 2. History of stage I breast carcinoma, S/P right mastectomy followed by chemotherapy then hormonal therapy, clinically no evidence of recurrence either.\par \par The situation was discussed with the patient.\par Will obtain CBC, CMP, LDH. If they're all within acceptable limits, she will be seen in a year for follow up.\par Otherwise, she will keep all her appointments with all her other physicians.\par \par All questions answered.

## 2022-05-17 DIAGNOSIS — Z85.3 PERSONAL HISTORY OF MALIGNANT NEOPLASM OF BREAST: ICD-10-CM

## 2022-05-17 DIAGNOSIS — Z85.79 PERSONAL HISTORY OF OTHER MALIGNANT NEOPLASMS OF LYMPHOID, HEMATOPOIETIC AND RELATED TISSUES: ICD-10-CM

## 2022-06-05 NOTE — ED ADULT NURSE NOTE - MUSCULOSKELETAL ASSESSMENT
[FreeTextEntry1] : 42 yo F with history of osteoporosis and recent L hip surgery for fracture recently diagnosed with left peroneal and PT vein DVT. Started on xarelto.\par \par Venous duplex demonstrates subacute occlusive thrombosis of L peroneal and PT veins. No propagation. [Arterial/Venous Disease] : arterial/venous disease [Medication Management] : medication management [Foot care/Footwear] : foot care/footwear WDL

## 2022-06-22 ENCOUNTER — OUTPATIENT (OUTPATIENT)
Dept: OUTPATIENT SERVICES | Facility: HOSPITAL | Age: 87
LOS: 1 days | Discharge: HOME | End: 2022-06-22

## 2022-06-22 ENCOUNTER — APPOINTMENT (OUTPATIENT)
Dept: UROGYNECOLOGY | Facility: CLINIC | Age: 87
End: 2022-06-22
Payer: MEDICARE

## 2022-06-22 VITALS
SYSTOLIC BLOOD PRESSURE: 140 MMHG | HEIGHT: 60 IN | DIASTOLIC BLOOD PRESSURE: 80 MMHG | BODY MASS INDEX: 30.27 KG/M2 | WEIGHT: 154.19 LBS

## 2022-06-22 PROCEDURE — 99214 OFFICE O/P EST MOD 30 MIN: CPT

## 2022-06-22 RX ORDER — EZETIMIBE 10 MG/1
10 TABLET ORAL
Refills: 0 | Status: ACTIVE | COMMUNITY

## 2022-06-22 RX ORDER — LANSOPRAZOLE 30 MG/1
30 CAPSULE, DELAYED RELEASE ORAL
Refills: 0 | Status: ACTIVE | COMMUNITY

## 2022-06-22 RX ORDER — METOPROLOL SUCCINATE 50 MG/1
50 TABLET, EXTENDED RELEASE ORAL
Refills: 0 | Status: ACTIVE | COMMUNITY

## 2022-06-22 RX ORDER — ASPIRIN 81 MG
81 TABLET,CHEWABLE ORAL
Refills: 0 | Status: ACTIVE | COMMUNITY

## 2022-06-22 RX ORDER — NIFEDIPINE 30 MG/1
30 TABLET, EXTENDED RELEASE ORAL
Refills: 0 | Status: ACTIVE | COMMUNITY

## 2022-06-22 NOTE — DISCUSSION/SUMMARY
[FreeTextEntry1] : \par Complete Uterovaginal Prolapse:\par Ring and support #8 removed, cleaned, inspected and reinserted. The patient tolerated this well. \par No bleeding, lesions, abnormal discharge were noted. \par The patient will follow up in 3 months for routine pessary management.\par \par Recurrent UTI\par Advised to call the office if she feels like she has an infection \par \par \par

## 2022-06-22 NOTE — HISTORY OF PRESENT ILLNESS
[FreeTextEntry1] : Patient is here for routine pessary cleaning for complete uterovaginal prolapse.\par Last seen 3/23/2022 for pessary cleaning. Ring and support #8\par \par H/o Breast cancer\par H/o Lymphoma\par CKD Cr 1.16 11/18\par \par 3/19: TVUS for postmenopausal bleeding: normal\par \par Patient has intermittent incomplete bladder emptying without reduction. At NP visit on 02/06/19,  cc (normal PVR would be 143 cc or less based on the volume voided).\par On 10/16/19,  cc.\par \par Fitted for: Ring and Support #6 (we did not have the actual pessary in the office)\par \par 6/16/21: +UTI: >100K E Coli, Resistant to: Ampicillin, Amp/Sulbactam, Cipro, Levaquin, Bactrim\par 9/22/21, +UTI, >100K E Coli, Resistant to Ampicillin, Amp/Sulbactam, Cipro, Levaquin, Bactrim, Treated with Macrobid 100mg BID x 7 days\par \par Recurrent UTI diagnosis\par Pt did BMP but does not want to do MRI or cysto. Does not want to use estrogen cream due to h/o Breast cancer\par \par Macorbid 100mg QD for suppression since 9/27/21\par \par 10/22/21: BUN/Cr: 30/1.7\par \par Today, patient is doing well and has no complaints. Completed macrobid suppression. \par \par Patient notes that occasionally she feels a bulge when having a bowel movement, does not bother her, denies constipation. \par

## 2022-06-22 NOTE — COUNSELING
[FreeTextEntry1] : \par \par Please call the office if you have any issues with vaginal pain, vaginal bleeding, difficulty urinating or having a bowel movement or if the pessary falls out so that we can arrange another size pessary.\par \par Please follow up for pessary maintenance in 3 months with GILDARDO Sanchez or GILDARDO Nieves \par \par If you feel like you have an infection it is important for you to call our office and we will arrange testing of your urine.\par \par Call with any questions\par

## 2022-06-27 ENCOUNTER — OUTPATIENT (OUTPATIENT)
Dept: OUTPATIENT SERVICES | Facility: HOSPITAL | Age: 87
LOS: 1 days | Discharge: HOME | End: 2022-06-27

## 2022-06-27 DIAGNOSIS — Z85.3 PERSONAL HISTORY OF MALIGNANT NEOPLASM OF BREAST: ICD-10-CM

## 2022-06-27 PROCEDURE — 76641 ULTRASOUND BREAST COMPLETE: CPT | Mod: 26,LT

## 2022-06-27 PROCEDURE — 77065 DX MAMMO INCL CAD UNI: CPT | Mod: 26,LT

## 2022-06-27 PROCEDURE — G0279: CPT | Mod: 26

## 2022-09-16 ENCOUNTER — OUTPATIENT (OUTPATIENT)
Dept: OUTPATIENT SERVICES | Facility: HOSPITAL | Age: 87
LOS: 1 days | Discharge: HOME | End: 2022-09-16

## 2022-09-16 ENCOUNTER — APPOINTMENT (OUTPATIENT)
Dept: UROGYNECOLOGY | Facility: CLINIC | Age: 87
End: 2022-09-16

## 2022-09-16 VITALS — WEIGHT: 154 LBS | BODY MASS INDEX: 30.08 KG/M2 | DIASTOLIC BLOOD PRESSURE: 64 MMHG | SYSTOLIC BLOOD PRESSURE: 126 MMHG

## 2022-09-16 PROCEDURE — 51702 INSERT TEMP BLADDER CATH: CPT

## 2022-09-16 PROCEDURE — 99214 OFFICE O/P EST MOD 30 MIN: CPT | Mod: 25

## 2022-09-18 NOTE — HISTORY OF PRESENT ILLNESS
[FreeTextEntry1] : Patient is here for routine pessary cleaning for complete uterovaginal prolapse.\par Last seen 6/22/2022 for pessary cleaning. Ring and support # 8\par \par H/o Breast cancer\par H/o Lymphoma\par CKD Cr 1.16 11/18\par \par 3/19: TVUS for postmenopausal bleeding: normal\par \par Patient has intermittent incomplete bladder emptying without reduction. At NP visit on 02/06/19,  cc (normal PVR would be 143 cc or less based on the volume voided).\par On 10/16/19,  cc.\par \par Fitted for: Ring and Support #6 (we did not have the actual pessary in the office)\par \par 6/16/21: +UTI: >100K E Coli, Resistant to: Ampicillin, Amp/Sulbactam, Cipro, Levaquin, Bactrim\par 9/22/21, +UTI, >100K E Coli, Resistant to Ampicillin, Amp/Sulbactam, Cipro, Levaquin, Bactrim, Treated with Macrobid 100mg BID x 7 days\par \par Recurrent UTI diagnosis\par Pt did BMP but does not want to do MRI or cysto. Does not want to use estrogen cream due to h/o Breast cancer\par \par Macorbid 100mg QD for suppression since 9/27/21\par \par 10/22/21: BUN/Cr: 30/1.7\par \par Today, patient is doing well. She notes that since stopping macrobid suppression she feels burning with urination for a few weeks. She occasionally had back pain, no back pain now. Denies fever/chills or blood in the urine. She feels that it may be an infection. \par \par Patient notes that occasionally she feels a bulge when having a bowel movement, does not bother her.\par \par \par

## 2022-09-18 NOTE — PHYSICAL EXAM
[Chaperone Present] : A chaperone was present in the examining room during all aspects of the physical examination [No Acute Distress] : in no acute distress [Well developed] : well developed [Well Nourished] : ~L well nourished [FreeTextEntry1] : Indication: Recurrent UTI\par Urethra was prepped in sterile fashion and then a sterile non- indwelling catheter (14F) was used by me to drain the bladder. The patient tolerated the procedure well.\par cath: 40 cc

## 2022-09-18 NOTE — DISCUSSION/SUMMARY
[FreeTextEntry1] : \par Complete Uterovaginal Prolapse:\par Ring and support #8 removed, cleaned, inspected and reinserted. The patient tolerated this well. \par No bleeding, lesions, abnormal discharge were noted. \par The patient will follow up in 3 months for routine pessary management.\par \par Recurrent UTIs\par Urine culture obtained.\par Will follow up.\par Will treat accordingly if necessary\par Empirically treated with macrobid 100 mg BID x 7 days, patient will decide if she wants to start it today or wait for culture results\par If culture is positive, patient may want to go back on to suppression afterwards\par \par \par \par

## 2022-09-18 NOTE — COUNSELING
[FreeTextEntry1] : Please call the office if you have any issues with vaginal pain, vaginal bleeding, difficulty urinating or having a bowel movement or if the pessary falls out so that we can arrange another size pessary.\par \par I sent in a prescription for Macrobid 100 mg twice a day for 7 days\par \par We will contact you if the urine results are abnormal.\par \par Please follow up for pessary maintenance in 3 months with GILDARDO Sanchez or GILDARDO Nieves \par \par \par

## 2022-09-19 ENCOUNTER — NON-APPOINTMENT (OUTPATIENT)
Age: 87
End: 2022-09-19

## 2022-09-20 LAB — URINE CULTURE <10: NORMAL

## 2022-11-16 ENCOUNTER — NON-APPOINTMENT (OUTPATIENT)
Age: 87
End: 2022-11-16

## 2022-12-22 ENCOUNTER — APPOINTMENT (OUTPATIENT)
Dept: UROGYNECOLOGY | Facility: CLINIC | Age: 87
End: 2022-12-22

## 2022-12-22 VITALS
DIASTOLIC BLOOD PRESSURE: 71 MMHG | HEIGHT: 60 IN | SYSTOLIC BLOOD PRESSURE: 131 MMHG | BODY MASS INDEX: 30.23 KG/M2 | HEART RATE: 72 BPM | WEIGHT: 154 LBS

## 2022-12-22 DIAGNOSIS — K59.00 CONSTIPATION, UNSPECIFIED: ICD-10-CM

## 2022-12-22 PROCEDURE — 51701 INSERT BLADDER CATHETER: CPT

## 2022-12-22 PROCEDURE — 99214 OFFICE O/P EST MOD 30 MIN: CPT | Mod: 25

## 2022-12-22 RX ORDER — NITROFURANTOIN (MONOHYDRATE/MACROCRYSTALS) 25; 75 MG/1; MG/1
100 CAPSULE ORAL DAILY
Qty: 30 | Refills: 2 | Status: COMPLETED | COMMUNITY
Start: 2021-09-29 | End: 2022-12-22

## 2022-12-22 RX ORDER — NITROFURANTOIN (MONOHYDRATE/MACROCRYSTALS) 25; 75 MG/1; MG/1
100 CAPSULE ORAL TWICE DAILY
Qty: 14 | Refills: 0 | Status: COMPLETED | COMMUNITY
Start: 2021-09-27 | End: 2022-12-22

## 2022-12-22 NOTE — COUNSELING
[FreeTextEntry1] : Please call the office if you have any issues with vaginal pain, vaginal bleeding, difficulty urinating or having a bowel movement or if the pessary falls out so that we can arrange another size pessary.\par \par Please follow up for pessary maintenance in 3 months with GILDARDO Sanchez\par \par We will contact you if the urine results are abnormal.\par \par Please take Macrobid 100 mg twice a day for 7 days\par \par Please contact your primary care doctor or gastroenterologist\par \par Referral to colorectal surgeon:\par Dr. Steel\par [address]\par [phone] \par \par

## 2022-12-22 NOTE — DISCUSSION/SUMMARY
[FreeTextEntry1] : \par Complete Uterovaginal Prolapse:\par Ring and support #8  removed, cleaned, inspected and reinserted. The patient tolerated this well. \par Slightly difficult to remove and place, can consider smaller size at next visit\par No bleeding, lesions, abnormal discharge were noted. \par The patient will follow up in 3 months for routine pessary management.\par \par + rectal prolapse on exam, referral to Dr. Steel\par Patient is unsure if she would like to see a colorectal surgeon, she would like to discuss this further with Dr. Howard\par \par Dysuria\par Urine culture obtained.\par Will follow up.\par Will treat accordingly if necessary\par Patient requested empiric treatment: Rx macrobid 100 mg BID x 7 days\par Advised to eat a well balanced diet\par \par Constipation\par Copy of bowel recipe given to the patient\par Advised to follow up with PCP/GI\par

## 2022-12-22 NOTE — PHYSICAL EXAM
[Chaperone Present] : A chaperone was present in the examining room during all aspects of the physical examination [No Acute Distress] : in no acute distress [Well developed] : well developed [Well Nourished] : ~L well nourished [FreeTextEntry1] : \par Indication: Dysuria\par Urethra was prepped in sterile fashion and then a sterile non- indwelling catheter (14F) was used by me to drain the bladder. The patient tolerated the procedure well.\par Cath: 110 cc\par \par On exam: + hemorrhoids, small amount of rectal prolapse with Valsalva, easily reduced.

## 2022-12-22 NOTE — HISTORY OF PRESENT ILLNESS
[FreeTextEntry1] : Patient is here for routine pessary cleaning for complete uterovaginal prolapse.\par Last seen 9/16/2022 for pessary cleaning. Ring and support # 8\par \par H/o Breast cancer\par H/o Lymphoma\par CKD Cr 1.16 11/18\par \par 3/19: TVUS for postmenopausal bleeding: normal\par \par Patient has intermittent incomplete bladder emptying without reduction. At NP visit on 02/06/19,  cc (normal PVR would be 143 cc or less based on the volume voided).\par On 10/16/19,  cc.\par \par Fitted for: Ring and Support #6 (we did not have the actual pessary in the office)\par \par 6/16/21: +UTI: >100K E Coli, Resistant to: Ampicillin, Amp/Sulbactam, Cipro, Levaquin, Bactrim\par 9/22/21, +UTI, >100K E Coli, Resistant to Ampicillin, Amp/Sulbactam, Cipro, Levaquin, Bactrim, Treated with Macrobid 100mg BID x 7 days\par \par Recurrent UTI diagnosis\par Pt did BMP but does not want to do MRI or cysto. Does not want to use estrogen cream due to h/o Breast cancer\par \par Macorbid 100mg QD for suppression since 9/27/21\par \par 10/22/21: BUN/Cr: 30/1.7\par \par 9/16/2022 10k-49K Aerococcus, predictably susceptible to penicillin, ampicillin, tetracycline, and vancomycin. All isolates are resistant to sulfonamides. Patient was empirically treated with macrobid, did not want to take additional treatment with a different antibiotic\par \par Patient called 11/16/2022 stating that she feels that she has a UTI and was unable to submit a urine sample for testing, she requested empiric treatment with macrobid. Patient was empirically treated with macrobid BID x 7 days.\par Patient notes that she continued to experience symptoms of a UTI and thought she needed 14 days of treatment. She tried to call the office but was unable to talk to anyone regarding her symptoms. \par \par Patient notes that she feels that she has a UTI, she has burning with urination. Denies fever/chills or hematuria. Notes a temp earlier this week of 99. \par \par She would like to be treated with macrobid again, thinks she may need to go back on macrobid suppression. \par \par She notes that she had an episode of constipation earlier this week, was unable to have bowel movement for 2.5 days, took a laxative and was experiencing cramping. At this time, she felt rectal prolapse- coming out of her rectum and it was painful. She notes that "she saw stars" when it went back in. She finds that it goes back in when she sits.\par \par She notes that she has not been eating that many foods since her episode of constipation. Has a history of IBS and diverticulitis. \par \par Happy with the pessary, denies bleeding or discharge

## 2022-12-23 ENCOUNTER — NON-APPOINTMENT (OUTPATIENT)
Age: 87
End: 2022-12-23

## 2022-12-26 LAB — URINE CULTURE <10: NORMAL

## 2023-01-02 ENCOUNTER — NON-APPOINTMENT (OUTPATIENT)
Age: 88
End: 2023-01-02

## 2023-01-13 ENCOUNTER — APPOINTMENT (OUTPATIENT)
Dept: UROGYNECOLOGY | Facility: CLINIC | Age: 88
End: 2023-01-13
Payer: MEDICARE

## 2023-01-13 VITALS
HEIGHT: 60 IN | WEIGHT: 150 LBS | HEART RATE: 69 BPM | DIASTOLIC BLOOD PRESSURE: 76 MMHG | SYSTOLIC BLOOD PRESSURE: 171 MMHG | BODY MASS INDEX: 29.45 KG/M2

## 2023-01-13 DIAGNOSIS — Z87.898 PERSONAL HISTORY OF OTHER SPECIFIED CONDITIONS: ICD-10-CM

## 2023-01-13 DIAGNOSIS — N39.0 URINARY TRACT INFECTION, SITE NOT SPECIFIED: ICD-10-CM

## 2023-01-13 LAB
BILIRUB UR QL STRIP: NORMAL
CLARITY UR: NORMAL
COLLECTION METHOD: NORMAL
GLUCOSE UR-MCNC: NORMAL
HCG UR QL: 0.2 EU/DL
HGB UR QL STRIP.AUTO: NORMAL
KETONES UR-MCNC: NORMAL
LEUKOCYTE ESTERASE UR QL STRIP: NORMAL
NITRITE UR QL STRIP: NORMAL
PH UR STRIP: 5.5
PROT UR STRIP-MCNC: NORMAL
SP GR UR STRIP: 1.02

## 2023-01-13 PROCEDURE — 99215 OFFICE O/P EST HI 40 MIN: CPT | Mod: 25

## 2023-01-13 PROCEDURE — 81003 URINALYSIS AUTO W/O SCOPE: CPT | Mod: QW

## 2023-01-13 PROCEDURE — 51701 INSERT BLADDER CATHETER: CPT

## 2023-01-13 RX ORDER — NITROFURANTOIN (MONOHYDRATE/MACROCRYSTALS) 25; 75 MG/1; MG/1
100 CAPSULE ORAL
Qty: 14 | Refills: 0 | Status: DISCONTINUED | COMMUNITY
Start: 2022-12-22 | End: 2023-01-13

## 2023-01-17 ENCOUNTER — NON-APPOINTMENT (OUTPATIENT)
Age: 88
End: 2023-01-17

## 2023-01-17 LAB — URINE CULTURE <10: ABNORMAL

## 2023-02-08 ENCOUNTER — APPOINTMENT (OUTPATIENT)
Dept: HEMATOLOGY ONCOLOGY | Facility: CLINIC | Age: 88
End: 2023-02-08
Payer: MEDICARE

## 2023-02-08 ENCOUNTER — OUTPATIENT (OUTPATIENT)
Dept: OUTPATIENT SERVICES | Facility: HOSPITAL | Age: 88
LOS: 1 days | End: 2023-02-08
Payer: MEDICARE

## 2023-02-08 VITALS — TEMPERATURE: 98 F

## 2023-02-08 DIAGNOSIS — M79.89 OTHER SPECIFIED SOFT TISSUE DISORDERS: ICD-10-CM

## 2023-02-08 DIAGNOSIS — N95.0 POSTMENOPAUSAL BLEEDING: ICD-10-CM

## 2023-02-08 PROCEDURE — 99213 OFFICE O/P EST LOW 20 MIN: CPT

## 2023-02-08 NOTE — HISTORY OF PRESENT ILLNESS
[de-identified] : The patient called yesterday stating that her right hand and, to a lesser degree, the right arm up to the shoulder, has been swollen for 3-4 weeks now.\par She was told to come for an evaluation.\par Otherwise, she is denying any fever, no night sweats.\par She is continuing doing her daily activities as before except the swelling causing heaviness.\par Years ago, the patient used to have some lymphedema post-surgery and we had prescribed a compressive sleave.\par \par

## 2023-02-08 NOTE — ASSESSMENT
[FreeTextEntry1] : 1. Swelling of the right hand up to mid-arm. Although this may well be a recurrence of the lymphedema she had years ago, a DVT should be ruled out given also the prominent veins in the hand.\par 2. History of right breast carcinoma, S/P surgery, chemotherapy and hormonal therapy. She has been in remission ever since.\par 3. History of DLBCL, stage II, treated with chemotherapy almost at the same time, with no evidence of recurrence either.\par \par The situation was discussed with the patient. \par She was recommended elevation of the RUE whenever possible (she doesn't sleep on her right side), avoid carrying heavy objects or grocery. We will obtain a duplex-Doppler for the RUE and, if DVT is ruled out, will recommend using a compression sleeve again.\par \par All questions anwered.

## 2023-02-08 NOTE — PHYSICAL EXAM
[Normal] : normal appearance, no rash, nodules, vesicles, ulcers, erythema [de-identified] : Right hand veins look much more prominent than on the left. [de-identified] : S/P right mastectomy. No evidence of local recurrence. [de-identified] : I could not feel any significant adenopathy in the right axilla. She is S/P axillary LN dissection done back in 2004 with the breast surgery. [de-identified] : The right hand is definitely swollen compared to the left with no significant erythema. The swelling is extending almost to the elbow.

## 2023-02-09 DIAGNOSIS — N95.0 POSTMENOPAUSAL BLEEDING: ICD-10-CM

## 2023-02-16 ENCOUNTER — OUTPATIENT (OUTPATIENT)
Dept: OUTPATIENT SERVICES | Facility: HOSPITAL | Age: 88
LOS: 1 days | End: 2023-02-16
Payer: MEDICARE

## 2023-02-16 ENCOUNTER — RESULT REVIEW (OUTPATIENT)
Age: 88
End: 2023-02-16

## 2023-02-16 DIAGNOSIS — M79.89 OTHER SPECIFIED SOFT TISSUE DISORDERS: ICD-10-CM

## 2023-02-16 PROCEDURE — 93971 EXTREMITY STUDY: CPT | Mod: 26,RT

## 2023-02-16 PROCEDURE — 93971 EXTREMITY STUDY: CPT | Mod: RT

## 2023-02-17 DIAGNOSIS — M79.89 OTHER SPECIFIED SOFT TISSUE DISORDERS: ICD-10-CM

## 2023-02-27 DIAGNOSIS — Z85.3 PERSONAL HISTORY OF MALIGNANT NEOPLASM OF BREAST: ICD-10-CM

## 2023-02-27 DIAGNOSIS — Z85.79 PERSONAL HISTORY OF OTHER MALIGNANT NEOPLASMS OF LYMPHOID, HEMATOPOIETIC AND RELATED TISSUES: ICD-10-CM

## 2023-02-27 DIAGNOSIS — R60.0 LOCALIZED EDEMA: ICD-10-CM

## 2023-03-07 ENCOUNTER — NON-APPOINTMENT (OUTPATIENT)
Age: 88
End: 2023-03-07

## 2023-03-09 ENCOUNTER — APPOINTMENT (OUTPATIENT)
Dept: UROGYNECOLOGY | Facility: CLINIC | Age: 88
End: 2023-03-09
Payer: MEDICARE

## 2023-03-09 VITALS
WEIGHT: 150 LBS | BODY MASS INDEX: 29.45 KG/M2 | DIASTOLIC BLOOD PRESSURE: 82 MMHG | HEART RATE: 82 BPM | SYSTOLIC BLOOD PRESSURE: 147 MMHG | HEIGHT: 60 IN

## 2023-03-09 DIAGNOSIS — N95.0 POSTMENOPAUSAL BLEEDING: ICD-10-CM

## 2023-03-09 PROCEDURE — 99214 OFFICE O/P EST MOD 30 MIN: CPT | Mod: 25

## 2023-03-09 PROCEDURE — 51701 INSERT BLADDER CATHETER: CPT

## 2023-03-09 NOTE — COUNSELING
Patient was advised to wear braces at night to help with the carpal tunnel.    [FreeTextEntry1] : Please call the office if you have any issues with vaginal pain, vaginal bleeding, difficulty urinating or having a bowel movement or if the pessary falls out so that we can arrange another size pessary.\par \par Please follow up for pessary maintenance in 3 months with GILDARDO Sanchez \par \par Please schedule a transvaginal sonogram. You will need an appointment at our office before the sonogram to remove the pessary and another appointment 2 days later to reinsert the pessary. \par \par We will contact you if the urine results are abnormal.\par \par Please take Tetracycline twice a day for 7 days\par \par Please contact your primary care doctor or gastroenterologist\par \par Referral to colorectal surgeon:\par Dr. Steel\par [address]\par [phone] \par

## 2023-03-09 NOTE — PHYSICAL EXAM
[Chaperone Present] : A chaperone was present in the examining room during all aspects of the physical examination [No Acute Distress] : in no acute distress [Well developed] : well developed [Well Nourished] : ~L well nourished [FreeTextEntry1] : Indication: Recurrent UTIs\par Urethra was prepped in sterile fashion and then a sterile non- indwelling catheter (14F) was used by me to drain the bladder. The patient tolerated the procedure well.\par cath: 50 cc

## 2023-03-09 NOTE — DISCUSSION/SUMMARY
[FreeTextEntry1] : \par Complete Uterovaginal Prolapse:\par Ring and support #8 removed, cleaned, inspected and reinserted. The patient tolerated this well. \par No bleeding, lesions, abnormal discharge were noted. \par The patient will follow up in 3 months for routine pessary management.\par \par Postmenopausal bleeding\par Advised further workup with a pelvic ultrasound to make sure the uterine lining is thin. Advised the patient that without a workup there is always a risk of uterine cancer or precancer that is not being diagnosed. THe patient voiced understanding and agrees to the workup.\par \par Recurrent UTI\par Urine culture obtained.\par Will follow up.\par Will treat accordingly if necessary\par Rx Tetracycline BID x 7 days (that helped last time, macrobid did not help the burning even though patient used to take macrobid all the time and likes to take macrobid)\par \par Advised to follow up with colorectal surgeon regarding rectal prolapse if it is bothersome to her. \par

## 2023-03-09 NOTE — HISTORY OF PRESENT ILLNESS
[FreeTextEntry1] : Patient is here for routine pessary cleaning for complete uterovaginal prolapse.\par Last seen 1/13/2023 for pessary cleaning. Ring and support # 8\par \par H/o Breast cancer\par H/o Lymphoma\par CKD Cr 1.16 11/18\par \par 3/19: TVUS for postmenopausal bleeding: normal\par \par Patient has intermittent incomplete bladder emptying without reduction. At NP visit on 02/06/19,  cc (normal PVR would be 143 cc or less based on the volume voided).\par On 10/16/19,  cc.\par \par Fitted for: Ring and Support #6 (we did not have the actual pessary in the office)\par \par 6/16/21: +UTI: >100K E Coli, Resistant to: Ampicillin, Amp/Sulbactam, Cipro, Levaquin, Bactrim\par 9/22/21, +UTI, >100K E Coli, Resistant to Ampicillin, Amp/Sulbactam, Cipro, Levaquin, Bactrim, Treated with Macrobid 100mg BID x 7 days\par \par Recurrent UTI diagnosis\par Pt did BMP but does not want to do MRI or cysto. Does not want to use estrogen cream due to h/o Breast cancer\par \par Macrobid 100 mg QD for suppression since 9/27/21\par \par 10/22/21: BUN/Cr: 30/1.7\par \par 9/16/2022 10 k-49 K Aerococcus, predictably susceptible to penicillin, ampicillin, tetracycline, and vancomycin. All isolates are resistant to sulfonamides. Patient was empirically treated with macrobid, did not want to take additional treatment with a different antibiotic\par \par Patient called 11/16/2022 stating that she feels that she has a UTI and was unable to submit a urine sample for testing, she requested empiric treatment with macrobid. Patient was empirically treated with macrobid BID x 7 days.\par Patient notes that she continued to experience symptoms of a UTI and thought she needed 14 days of treatment. She tried to call the office but was unable to talk to anyone regarding her symptoms. \par \par 1/17/2023 urine culture: > 100 K E. Coli, treated with tetracycline BID x 7 days\par \par She notes that she feels the rectal prolapse coming out more when she has a bowel movement. She sits on the toilet seat (with toilet closed so she has a flat surface) and it goes back in. She is scared to have surgery and therefore has not scheduled an appointment with Dr. Steel. \par \par She notes that she has been experiencing burning with urination for the past 4 weeks.\par \par On Sunday, she experienced one episode of bleeding while sitting on the toilet urinating. She wiped and saw red, and red in the toilet bowl. She used three rolls of toilet paper and sat there for 40 minutes until the bleeding stopped. Denies bleeding since. She feels that it was "in the front" as that was where she saw the blood when wiping. She also felt a little light headed that night as she felt that she lost so much blood. \par \par She feels better but is questioning why she experienced the bleeding. Denies issues with the pessary. \par \par \par  \par

## 2023-03-13 LAB — URINE CULTURE <10: NORMAL

## 2023-03-21 ENCOUNTER — APPOINTMENT (OUTPATIENT)
Dept: UROGYNECOLOGY | Facility: CLINIC | Age: 88
End: 2023-03-21
Payer: MEDICARE

## 2023-03-21 ENCOUNTER — OUTPATIENT (OUTPATIENT)
Dept: OUTPATIENT SERVICES | Facility: HOSPITAL | Age: 88
LOS: 1 days | End: 2023-03-21
Payer: MEDICARE

## 2023-03-21 VITALS
WEIGHT: 150 LBS | BODY MASS INDEX: 29.45 KG/M2 | HEIGHT: 60 IN | HEART RATE: 82 BPM | SYSTOLIC BLOOD PRESSURE: 117 MMHG | DIASTOLIC BLOOD PRESSURE: 73 MMHG

## 2023-03-21 DIAGNOSIS — Z00.8 ENCOUNTER FOR OTHER GENERAL EXAMINATION: ICD-10-CM

## 2023-03-21 DIAGNOSIS — N95.0 POSTMENOPAUSAL BLEEDING: ICD-10-CM

## 2023-03-21 PROCEDURE — 76830 TRANSVAGINAL US NON-OB: CPT

## 2023-03-21 PROCEDURE — 76830 TRANSVAGINAL US NON-OB: CPT | Mod: 26

## 2023-03-21 PROCEDURE — 99213 OFFICE O/P EST LOW 20 MIN: CPT

## 2023-03-22 DIAGNOSIS — N95.0 POSTMENOPAUSAL BLEEDING: ICD-10-CM

## 2023-03-23 ENCOUNTER — APPOINTMENT (OUTPATIENT)
Dept: UROGYNECOLOGY | Facility: CLINIC | Age: 88
End: 2023-03-23
Payer: MEDICARE

## 2023-03-23 VITALS
DIASTOLIC BLOOD PRESSURE: 89 MMHG | SYSTOLIC BLOOD PRESSURE: 159 MMHG | WEIGHT: 150 LBS | HEIGHT: 60 IN | BODY MASS INDEX: 29.45 KG/M2 | HEART RATE: 90 BPM

## 2023-03-23 PROCEDURE — 99213 OFFICE O/P EST LOW 20 MIN: CPT

## 2023-03-24 ENCOUNTER — NON-APPOINTMENT (OUTPATIENT)
Age: 88
End: 2023-03-24

## 2023-03-24 NOTE — DISCUSSION/SUMMARY
[FreeTextEntry1] : Complete Uterovaginal Prolapse:\par Ring and support #8  removed, cleaned, inspected and NOT reinserted. The patient tolerated this well. \par No bleeding, lesions, abnormal discharge were noted. \par Pessary given to the patient. \par Precautions reviewed\par Will return for pessary reinsertion in 2-3 days. \par \par

## 2023-03-24 NOTE — HISTORY OF PRESENT ILLNESS
[FreeTextEntry1] : Patient is here for pessary removal for TVUS.\par Last seen 3/9/2023 for pessary cleaning. Ring and support # 8\par \par H/o Breast cancer\par H/o Lymphoma\par CKD Cr 1.16 11/18\par \par 3/19: TVUS for postmenopausal bleeding: normal\par \par Patient has intermittent incomplete bladder emptying without reduction. At NP visit on 02/06/19,  cc (normal PVR would be 143 cc or less based on the volume voided).\par On 10/16/19,  cc.\par \par Fitted for: Ring and Support #6 (we did not have the actual pessary in the office)\par \par 6/16/21: +UTI: >100K E Coli, Resistant to: Ampicillin, Amp/Sulbactam, Cipro, Levaquin, Bactrim\par 9/22/21, +UTI, >100K E Coli, Resistant to Ampicillin, Amp/Sulbactam, Cipro, Levaquin, Bactrim, Treated with Macrobid 100mg BID x 7 days\par \par Recurrent UTI diagnosis\par Pt did BMP but does not want to do MRI or cysto. Does not want to use estrogen cream due to h/o Breast cancer\par \par Macrobid 100 mg QD for suppression since 9/27/21\par \par 10/22/21: BUN/Cr: 30/1.7\par \par 9/16/2022 10 k-49 K Aerococcus, predictably susceptible to penicillin, ampicillin, tetracycline, and vancomycin. All isolates are resistant to sulfonamides. Patient was empirically treated with macrobid, did not want to take additional treatment with a different antibiotic\par \par Patient called 11/16/2022 stating that she feels that she has a UTI and was unable to submit a urine sample for testing, she requested empiric treatment with macrobid. Patient was empirically treated with macrobid BID x 7 days.\par Patient notes that she continued to experience symptoms of a UTI and thought she needed 14 days of treatment. She tried to call the office but was unable to talk to anyone regarding her symptoms. \par \par 1/17/2023 urine culture: > 100 K E. Coli, treated with tetracycline BID x 7 days\par \par Last visit patient reported an episode of bleeding. Patient will be going for a transvaginal sonogram later today and here is for pessary removal. \par \par She is taking antibiotics and prednisone for bronchitis and asthma. \par

## 2023-03-24 NOTE — COUNSELING
[FreeTextEntry1] : \par Please return for your scheduled appointment on Thursday to reinsert the pessary. \par \par Please call if you have any issues with the bulge coming out or you are unable to urinate for more than 4 hours. \par \par

## 2023-03-27 NOTE — DISCUSSION/SUMMARY
[FreeTextEntry1] : \par Complete Uterovaginal Prolapse:\par Ring and support #8 inserted. The patient tolerated this well. \par precautions reviewed\par The patient will follow up in 3 months for routine pessary management.\par \par Advised to follow up with PCP for further evaluation of why she was bleeding. Patient denies that it was rectal bleeding.

## 2023-03-27 NOTE — HISTORY OF PRESENT ILLNESS
[FreeTextEntry1] : Patient is here for pessary insertion\par Last seen 3/21/2023 for pessary removal for TVUS\par \par H/o Breast cancer\par H/o Lymphoma\par CKD Cr 1.16 11/18\par \par 3/19: TVUS for postmenopausal bleeding: normal\par \par Patient has intermittent incomplete bladder emptying without reduction. At NP visit on 02/06/19,  cc (normal PVR would be 143 cc or less based on the volume voided).\par On 10/16/19,  cc.\par \par Fitted for: Ring and Support #6 (we did not have the actual pessary in the office)\par \par 6/16/21: +UTI: >100K E Coli, Resistant to: Ampicillin, Amp/Sulbactam, Cipro, Levaquin, Bactrim\par 9/22/21, +UTI, >100K E Coli, Resistant to Ampicillin, Amp/Sulbactam, Cipro, Levaquin, Bactrim, Treated with Macrobid 100mg BID x 7 days\par \par Recurrent UTI diagnosis\par Pt did BMP but does not want to do MRI or cysto. Does not want to use estrogen cream due to h/o Breast cancer\par \par Macrobid 100 mg QD for suppression since 9/27/21\par \par 10/22/21: BUN/Cr: 30/1.7\par \par 9/16/2022 10 k-49 K Aerococcus, predictably susceptible to penicillin, ampicillin, tetracycline, and vancomycin. All isolates are resistant to sulfonamides. Patient was empirically treated with macrobid, did not want to take additional treatment with a different antibiotic\par \par Patient called 11/16/2022 stating that she feels that she has a UTI and was unable to submit a urine sample for testing, she requested empiric treatment with macrobid. Patient was empirically treated with macrobid BID x 7 days.\par Patient notes that she continued to experience symptoms of a UTI and thought she needed 14 days of treatment. She tried to call the office but was unable to talk to anyone regarding her symptoms. \par \par 1/17/2023 urine culture: > 100 K E. Coli, treated with tetracycline BID x 7 days\par \par Patient reported an episode of bleeding and went for TVUS for further evaluation. \par \par 3/21/2023 TVUS normal\par \par Today, patient is doing well, denies bleeding. \par

## 2023-03-27 NOTE — COUNSELING
[FreeTextEntry1] : \par Please call the office if you have any issues with vaginal pain, vaginal bleeding, difficulty urinating or having a bowel movement or if the pessary falls out so that we can arrange another size pessary.\par \par \par Please follow up for pessary maintenance in 3 months (you have an appointment scheduled for 6/9/2023)\par \par \par

## 2023-04-04 NOTE — DISCUSSION/SUMMARY
[FreeTextEntry1] : \par Recurrent UTI-\par The patient was advised that if she chooses not complete the workup that there is a risk that she can have a cancer in the bladder or the kidneys that will not be diagnosed. The patient voiced understanding.\par Will send the urine for testing. Will empirically start treatment with tetracycline based on the previous culture results.\par Discussed and answered all of the patient's questions about her vaginal and rectal prolapse.\par

## 2023-04-04 NOTE — PHYSICAL EXAM
[Chaperone Present] : A chaperone was present in the examining room during all aspects of the physical examination [FreeTextEntry1] : Indication: recurrent uti\par Urethra was prepped in sterile fashion and then a sterile non- indwelling catheter (14F) was used by me to drain the bladder. The patient tolerated the procedure well.\par Void: missed the hat\par PVR: 20cc

## 2023-04-04 NOTE — HISTORY OF PRESENT ILLNESS
[FreeTextEntry1] : \par The patient is here for followup for her complete uterovaginal prolapse, rectal prolapse and recurrent UTI\par \par Has been happy currently with ring with support #8. Asked extensive questions about which compartment of the vagina is prolapsing and to what extend\par \par Intermittently is bothered by the rectal prolapse. I advised the patient that I do not treat rectal prolapse and if she wants further information about management then she will need a consultation with colorectal.\par \par Recurrent UTI-\par Declined workup including renal imaging and cysto\par Declined estrogen cream usage because of her history of breast cancer\par Using macrobid suppression\par Still has dysuria despite having macrobid treatment recently. Very uncomfortable\par 12/23/22: UCX: aerococcus S amp S tetracycline and vanco\par

## 2023-04-04 NOTE — COUNSELING
[FreeTextEntry1] : \par We will notify you of the urine results\par \par Please start the tetracycline (antibiotic) twice a day for 7 days\par \par You already took macrobid last visit and you still have the burning\par \par Please continue with the colace, prune juice and the oat bran\par \par If the rectal prolapse becomes more bothersome, please call the office so that we can arrange a consultation with Dr Steel\par \par Please call the office if you have any issues with vaginal pain, vaginal bleeding, difficulty urinating or having a bowel movement or if the pessary falls out so that we can arrange another size pessary.\par \par Please return for your next scheduled pessary check

## 2023-05-18 ENCOUNTER — APPOINTMENT (OUTPATIENT)
Dept: HEMATOLOGY ONCOLOGY | Facility: CLINIC | Age: 88
End: 2023-05-18
Payer: MEDICARE

## 2023-05-18 ENCOUNTER — OUTPATIENT (OUTPATIENT)
Dept: OUTPATIENT SERVICES | Facility: HOSPITAL | Age: 88
LOS: 1 days | End: 2023-05-18
Payer: MEDICARE

## 2023-05-18 ENCOUNTER — APPOINTMENT (OUTPATIENT)
Dept: HEMATOLOGY ONCOLOGY | Facility: CLINIC | Age: 88
End: 2023-05-18

## 2023-05-18 ENCOUNTER — LABORATORY RESULT (OUTPATIENT)
Age: 88
End: 2023-05-18

## 2023-05-18 VITALS
HEART RATE: 69 BPM | DIASTOLIC BLOOD PRESSURE: 88 MMHG | TEMPERATURE: 98 F | SYSTOLIC BLOOD PRESSURE: 192 MMHG | WEIGHT: 151 LBS | HEIGHT: 60 IN | BODY MASS INDEX: 29.64 KG/M2 | RESPIRATION RATE: 16 BRPM

## 2023-05-18 DIAGNOSIS — Z85.79 PERSONAL HISTORY OF OTHER MALIGNANT NEOPLASMS OF LYMPHOID, HEMATOPOIETIC AND RELATED TISSUES: ICD-10-CM

## 2023-05-18 DIAGNOSIS — N95.0 POSTMENOPAUSAL BLEEDING: ICD-10-CM

## 2023-05-18 DIAGNOSIS — Z80.7 FAMILY HISTORY OF OTHER MALIGNANT NEOPLASMS OF LYMPHOID, HEMATOPOIETIC AND RELATED TISSUES: ICD-10-CM

## 2023-05-18 DIAGNOSIS — R60.0 LOCALIZED EDEMA: ICD-10-CM

## 2023-05-18 DIAGNOSIS — Z85.3 PERSONAL HISTORY OF MALIGNANT NEOPLASM OF BREAST: ICD-10-CM

## 2023-05-18 LAB
ALBUMIN SERPL ELPH-MCNC: 4.4 G/DL
ALP BLD-CCNC: 88 U/L
ALT SERPL-CCNC: 10 U/L
ANION GAP SERPL CALC-SCNC: 15 MMOL/L
AST SERPL-CCNC: 31 U/L
BILIRUB SERPL-MCNC: 0.3 MG/DL
BUN SERPL-MCNC: 30 MG/DL
CALCIUM SERPL-MCNC: 10 MG/DL
CHLORIDE SERPL-SCNC: 107 MMOL/L
CO2 SERPL-SCNC: 21 MMOL/L
CREAT SERPL-MCNC: 1.1 MG/DL
EGFR: 49 ML/MIN/1.73M2
GLUCOSE SERPL-MCNC: 87 MG/DL
HCT VFR BLD CALC: 41.4 %
HGB BLD-MCNC: 13.4 G/DL
LDH SERPL-CCNC: 470
MCHC RBC-ENTMCNC: 28.2 PG
MCHC RBC-ENTMCNC: 32.4 G/DL
MCV RBC AUTO: 87.2 FL
PLATELET # BLD AUTO: 197 K/UL
PMV BLD: 11.5 FL
POTASSIUM SERPL-SCNC: 5.4 MMOL/L
PROT SERPL-MCNC: 7.8 G/DL
RBC # BLD: 4.75 M/UL
RBC # FLD: 13.4 %
SODIUM SERPL-SCNC: 143 MMOL/L
WBC # FLD AUTO: 11.33 K/UL

## 2023-05-18 PROCEDURE — 99214 OFFICE O/P EST MOD 30 MIN: CPT

## 2023-05-18 PROCEDURE — 85027 COMPLETE CBC AUTOMATED: CPT

## 2023-05-18 PROCEDURE — 80053 COMPREHEN METABOLIC PANEL: CPT

## 2023-05-18 PROCEDURE — 83615 LACTATE (LD) (LDH) ENZYME: CPT

## 2023-05-18 NOTE — REVIEW OF SYSTEMS
[Loss of Hearing] : loss of hearing [Constipation] : constipation [Joint Pain] : joint pain [Joint Stiffness] : joint stiffness [Difficulty Walking] : difficulty walking [Negative] : Heme/Lymph [FreeTextEntry3] : Macular degeneration [FreeTextEntry8] : UTI resolved [FreeTextEntry9] : Torn meniscus in the right knee. [de-identified] : Sometimes legs feel heavy

## 2023-05-18 NOTE — HISTORY OF PRESENT ILLNESS
[Disease: _____________________] : Disease: [unfilled] [de-identified] : The patient is coming for her annual follow up for her right breast carcinoma (S/P mastectomy, chemotherapy, hormonal therapy) and stage II DLBCL histories.\par Besides her swelling of the right hand, she has no new particular complaints. She is having physical therapy for the swelling. She was given a sleeve to control the RUE (especially the hand) edema secondary to lymphedema from previous mastectomy.\par No new medications. \par The patient has not had a mammogram in over a year.\par She follows with her gynecologist regularly for uterine prolapse.

## 2023-05-18 NOTE — ASSESSMENT
[FreeTextEntry1] : 1. History of Rt breast carcinoma, S/P mastectomy, chemotherapy and hormonal therapy several years ago, clinically no evidence of recurrence.\par 2. History of DLBCL, stage II, S/P chemotherapy, also diagnosed and treated several years ago, almost concurrently as the breast cancer, presently no evidence of disease either.\par \par The situation was discussed with the patient.\par We will obtain CBC, CMP, LDH. \par The patient will undergo right breast mammogram also.\par She will continue with physical therapy for the RUE lymphedema.\par \par Further recommendations after the above test results are available.\par \par All questions answered.\par \par F/U in a year, or sooner if new events occur.

## 2023-05-18 NOTE — PHYSICAL EXAM
[Restricted in physically strenuous activity but ambulatory and able to carry out work of a light or sedentary nature] : Status 1- Restricted in physically strenuous activity but ambulatory and able to carry out work of a light or sedentary nature, e.g., light house work, office work [Normal] : affect appropriate [de-identified] : But somewhat overweight [de-identified] : But decreased hearing [de-identified] : S/P right mastectomy no evidence of local recurrence. Left breast negative for any masses or discharge. [de-identified] : Arthritic changes

## 2023-05-18 NOTE — CONSULT LETTER
[Dear  ___] : Dear  [unfilled], [Courtesy Letter:] : I had the pleasure of seeing your patient, [unfilled], in my office today. [Please see my note below.] : Please see my note below. [Consult Closing:] : Thank you very much for allowing me to participate in the care of this patient.  If you have any questions, please do not hesitate to contact me. [Sincerely,] : Sincerely, [FreeTextEntry2] : Dr. REILLY Stallings [FreeTextEntry3] : Dr. HALINA Allison [DrGama  ___] : Dr. REYES

## 2023-05-31 ENCOUNTER — LABORATORY RESULT (OUTPATIENT)
Age: 88
End: 2023-05-31

## 2023-05-31 NOTE — ED PROVIDER NOTE - NS_EDPROVIDERDISPOUSERTYPE_ED_A_ED
· Patient had recent hospitalization following syncopal episode at facility with head strike  · CT of head showed focal soft tissue laceration of the midline posterior scalp without any acute intracranial hemorrhage  · CT cervical spine revealed moderate spinal canal stenosis with associated bilateral neural foraminal narrowing  · Negative for cervical fracture   · patient was found to have UTI and completed course of Augmentin  · History of orthostatic hypotension, continue midodrine  · Continue PT/OT   · maintain fall/safety precautions  · Assist patient with ADLs/IADLs as needed Attending Attestation (For Attendings USE Only)...

## 2023-06-01 ENCOUNTER — APPOINTMENT (OUTPATIENT)
Dept: UROGYNECOLOGY | Facility: CLINIC | Age: 88
End: 2023-06-01
Payer: MEDICARE

## 2023-06-01 VITALS
HEART RATE: 97 BPM | BODY MASS INDEX: 29.64 KG/M2 | HEIGHT: 60 IN | WEIGHT: 151 LBS | DIASTOLIC BLOOD PRESSURE: 63 MMHG | SYSTOLIC BLOOD PRESSURE: 102 MMHG

## 2023-06-01 DIAGNOSIS — Z87.898 PERSONAL HISTORY OF OTHER SPECIFIED CONDITIONS: ICD-10-CM

## 2023-06-01 DIAGNOSIS — N39.0 URINARY TRACT INFECTION, SITE NOT SPECIFIED: ICD-10-CM

## 2023-06-01 PROCEDURE — A4562: CPT

## 2023-06-01 PROCEDURE — 57160 INSERT PESSARY/OTHER DEVICE: CPT

## 2023-06-01 PROCEDURE — 99214 OFFICE O/P EST MOD 30 MIN: CPT | Mod: 25

## 2023-06-01 RX ORDER — TETRACYCLINE HYDROCHLORIDE 500 MG/1
500 CAPSULE ORAL
Qty: 14 | Refills: 0 | Status: COMPLETED | COMMUNITY
Start: 2023-01-13 | End: 2023-06-01

## 2023-06-01 RX ORDER — NITROFURANTOIN (MONOHYDRATE/MACROCRYSTALS) 25; 75 MG/1; MG/1
100 CAPSULE ORAL TWICE DAILY
Qty: 14 | Refills: 0 | Status: ACTIVE | COMMUNITY
Start: 2023-06-01 | End: 1900-01-01

## 2023-06-01 NOTE — PHYSICAL EXAM
[Chaperone Present] : A chaperone was present in the examining room during all aspects of the physical examination [FreeTextEntry1] : Indication: dysuria/recurrent UTIs\par Urethra was prepped in sterile fashion and then a sterile non indwelling catheter (14F) was used by me to drain the bladder. The patient tolerated the procedure well.\par \par cath: 60cc (cloudy with pus at the end of collection) [No Acute Distress] : in no acute distress [Well developed] : well developed [Well Nourished] : ~L well nourished

## 2023-06-01 NOTE — COUNSELING
[FreeTextEntry1] : Please call the office if you have any issues with vaginal pain, vaginal bleeding, difficulty urinating or having a bowel movement or if the pessary falls out so that we can arrange another size pessary.\par \par We will contact you with urine results.\par \par Please start Macrobid 100mg twice a day for 7 days for urinary infection.\par \par Please follow up for pessary maintenance in 2 months with GILDARDO Sanchez.\par

## 2023-06-01 NOTE — HISTORY OF PRESENT ILLNESS
[FreeTextEntry1] : Patient is here for routine pessary cleaning for complete uterovaginal prolapse.\par Last seen 3/23/23 for pessary cleaning. ring and support # 8\par \par H/o Breast cancer. Declined estrogen cream \par H/o Lymphoma\par \par Patient has intermittent incomplete bladder emptying without reduction. At NP visit on 02/06/19,  cc (normal PVR would be 143 cc or less based on the volume voided).\par On 10/16/19,  cc.\par \par Fitted for: Ring and Support #6 (we did not have the actual pessary in the office)\par Recurrent UTI diagnosis\par Declined work up. \par \par Macrobid 100 mg QD for suppression since 9/27/21\par \par 10/22/21: BUN/Cr: 30/1.7\par 1/17/2023 urine culture: > 100 K E. Coli, treated with tetracycline BID x 7 days\par 3/21/2023 TVUS normal\par \par \par Today, patient is doing well. C/o burning with urination x 1 week. Denies fever or hematuria. Still bothered intermittently by rectal prolapse, but only bothered that she feels the prolapse with bowel movements. Doing well with the pessary. Has not had anymore episodes of bleeding.

## 2023-06-01 NOTE — DISCUSSION/SUMMARY
[FreeTextEntry1] : Complete Uterovaginal Prolapse:\par Ring and support # 8 removed, cleaned, inspected and NOT reinserted. The patient tolerated this well. \par It was difficult to remove the pessary today, the pessary was at the end of the vaginal canal deep in the introitus.\par Discussed trying a smaller size pessary and pt was agreeable. \par No bleeding, lesions, abnormal discharge were noted. \par \par Fitting:\par GH: 4-5 TVL: 7\par \par Ring and support # 6  placed without difficulty. Remained in place with Valsalva, coughing, and ambulating. \par FItter removed. New pessary ring and support # 6 placed\par The patient tolerated all fittings well.\par All precautions d/w pt\par Return in 2 months for pessary check and PVR check\par \par Recurrent UTIs\par Urine culture obtained.\par Will follow up.\par Rx Macrobid 100mg BID x 7 days\par \par

## 2023-06-05 ENCOUNTER — NON-APPOINTMENT (OUTPATIENT)
Age: 88
End: 2023-06-05

## 2023-06-05 LAB — URINE CULTURE <10: ABNORMAL

## 2023-06-07 NOTE — ED PROVIDER NOTE - PRO INTERPRETER NEED 2
Assessment/Plan:      Diagnoses and all orders for this visit:    Equinus contracture of right ankle      Patient is stable postop 2 weeks    Incision: healed, sutures removed    Instructions given to patient  Rest the foot as much as possible and elevate/ice  if swollen  Ambulatory status: WBAT in CAM boot    Images reviewed today: none    RTC: continue wound care at the [facility]  His ankle deformity is severely rigid but with the achilles procedure and WB I am hoping he can get to 90 degrees DF  No sign of infection or bleeding issues  I did not examine his plantar foot ulcer  HE is going to the [facility] regularly  I am fine with a TCC or CAM boot weight bearing      Subjective:     Patient ID: Scooter Benitez is a 42 y o  male  DOS: 2023     Procedure: Achilles lengthening     Condition: Dressing C/D/I  Incisions are healed  HE has no pain  The ankle can bend to 90 degrees  Review of Systems      Objective:     Physical Exam    Incision posterior achilles is healed  Foot has mild improvement in DF, he is not quite at 90 degrees but it is improved  No sign of infection  No calf pain with compression  Dressing to plantar foot intact 
English

## 2023-08-14 ENCOUNTER — INPATIENT (INPATIENT)
Facility: HOSPITAL | Age: 88
LOS: 14 days | Discharge: NURSING, REHAB OR LONG-TERM CARE | DRG: 312 | End: 2023-08-29
Attending: INTERNAL MEDICINE | Admitting: HOSPITALIST
Payer: MEDICARE

## 2023-08-14 VITALS
TEMPERATURE: 98 F | SYSTOLIC BLOOD PRESSURE: 179 MMHG | DIASTOLIC BLOOD PRESSURE: 94 MMHG | OXYGEN SATURATION: 100 % | HEIGHT: 60 IN | WEIGHT: 149.91 LBS | RESPIRATION RATE: 20 BRPM | HEART RATE: 84 BPM

## 2023-08-14 LAB
ALBUMIN SERPL ELPH-MCNC: 4.4 G/DL — SIGNIFICANT CHANGE UP (ref 3.5–5.2)
ALP SERPL-CCNC: 59 U/L — SIGNIFICANT CHANGE UP (ref 30–115)
ALT FLD-CCNC: 13 U/L — SIGNIFICANT CHANGE UP (ref 0–41)
ANION GAP SERPL CALC-SCNC: 15 MMOL/L — HIGH (ref 7–14)
APPEARANCE UR: ABNORMAL
AST SERPL-CCNC: 47 U/L — HIGH (ref 0–41)
BACTERIA # UR AUTO: ABNORMAL /HPF
BASOPHILS # BLD AUTO: 0.09 K/UL — SIGNIFICANT CHANGE UP (ref 0–0.2)
BASOPHILS NFR BLD AUTO: 0.7 % — SIGNIFICANT CHANGE UP (ref 0–1)
BILIRUB SERPL-MCNC: 0.3 MG/DL — SIGNIFICANT CHANGE UP (ref 0.2–1.2)
BILIRUB UR-MCNC: NEGATIVE — SIGNIFICANT CHANGE UP
BUN SERPL-MCNC: 21 MG/DL — HIGH (ref 10–20)
CALCIUM SERPL-MCNC: 10.2 MG/DL — SIGNIFICANT CHANGE UP (ref 8.4–10.5)
CAST: 17 /LPF — HIGH (ref 0–4)
CHLORIDE SERPL-SCNC: 103 MMOL/L — SIGNIFICANT CHANGE UP (ref 98–110)
CO2 SERPL-SCNC: 22 MMOL/L — SIGNIFICANT CHANGE UP (ref 17–32)
COLOR SPEC: SIGNIFICANT CHANGE UP
CREAT SERPL-MCNC: 1.4 MG/DL — SIGNIFICANT CHANGE UP (ref 0.7–1.5)
DIFF PNL FLD: NEGATIVE — SIGNIFICANT CHANGE UP
EGFR: 37 ML/MIN/1.73M2 — LOW
EOSINOPHIL # BLD AUTO: 0.08 K/UL — SIGNIFICANT CHANGE UP (ref 0–0.7)
EOSINOPHIL NFR BLD AUTO: 0.6 % — SIGNIFICANT CHANGE UP (ref 0–8)
GLUCOSE SERPL-MCNC: 96 MG/DL — SIGNIFICANT CHANGE UP (ref 70–99)
GLUCOSE UR QL: NEGATIVE MG/DL — SIGNIFICANT CHANGE UP
HCT VFR BLD CALC: 42.5 % — SIGNIFICANT CHANGE UP (ref 37–47)
HGB BLD-MCNC: 13.9 G/DL — SIGNIFICANT CHANGE UP (ref 12–16)
HYALINE CASTS # UR AUTO: 17 /LPF — HIGH (ref 0–4)
IMM GRANULOCYTES NFR BLD AUTO: 0.4 % — HIGH (ref 0.1–0.3)
KETONES UR-MCNC: ABNORMAL MG/DL
LEUKOCYTE ESTERASE UR-ACNC: ABNORMAL
LYMPHOCYTES # BLD AUTO: 17.8 % — LOW (ref 20.5–51.1)
LYMPHOCYTES # BLD AUTO: 2.44 K/UL — SIGNIFICANT CHANGE UP (ref 1.2–3.4)
MCHC RBC-ENTMCNC: 28.7 PG — SIGNIFICANT CHANGE UP (ref 27–31)
MCHC RBC-ENTMCNC: 32.7 G/DL — SIGNIFICANT CHANGE UP (ref 32–37)
MCV RBC AUTO: 87.6 FL — SIGNIFICANT CHANGE UP (ref 81–99)
MONOCYTES # BLD AUTO: 0.81 K/UL — HIGH (ref 0.1–0.6)
MONOCYTES NFR BLD AUTO: 5.9 % — SIGNIFICANT CHANGE UP (ref 1.7–9.3)
NEUTROPHILS # BLD AUTO: 10.22 K/UL — HIGH (ref 1.4–6.5)
NEUTROPHILS NFR BLD AUTO: 74.6 % — SIGNIFICANT CHANGE UP (ref 42.2–75.2)
NITRITE UR-MCNC: NEGATIVE — SIGNIFICANT CHANGE UP
NRBC # BLD: 0 /100 WBCS — SIGNIFICANT CHANGE UP (ref 0–0)
PH UR: 5 — SIGNIFICANT CHANGE UP (ref 5–8)
PLATELET # BLD AUTO: 362 K/UL — SIGNIFICANT CHANGE UP (ref 130–400)
PMV BLD: 10.2 FL — SIGNIFICANT CHANGE UP (ref 7.4–10.4)
POTASSIUM SERPL-MCNC: SIGNIFICANT CHANGE UP MMOL/L (ref 3.5–5)
POTASSIUM SERPL-SCNC: SIGNIFICANT CHANGE UP MMOL/L (ref 3.5–5)
PROT SERPL-MCNC: 7.7 G/DL — SIGNIFICANT CHANGE UP (ref 6–8)
PROT UR-MCNC: 100 MG/DL
RBC # BLD: 4.85 M/UL — SIGNIFICANT CHANGE UP (ref 4.2–5.4)
RBC # FLD: 13.8 % — SIGNIFICANT CHANGE UP (ref 11.5–14.5)
RBC CASTS # UR COMP ASSIST: 3 /HPF — SIGNIFICANT CHANGE UP (ref 0–4)
SODIUM SERPL-SCNC: 140 MMOL/L — SIGNIFICANT CHANGE UP (ref 135–146)
SP GR SPEC: 1.02 — SIGNIFICANT CHANGE UP (ref 1–1.03)
SQUAMOUS # UR AUTO: 10 /HPF — HIGH (ref 0–5)
TRANS CELLS #/AREA URNS HPF: PRESENT
TROPONIN T SERPL-MCNC: <0.01 NG/ML — SIGNIFICANT CHANGE UP
UROBILINOGEN FLD QL: 1 MG/DL — SIGNIFICANT CHANGE UP (ref 0.2–1)
WBC # BLD: 13.69 K/UL — HIGH (ref 4.8–10.8)
WBC # FLD AUTO: 13.69 K/UL — HIGH (ref 4.8–10.8)
WBC UR QL: 17 /HPF — HIGH (ref 0–5)

## 2023-08-14 PROCEDURE — 70551 MRI BRAIN STEM W/O DYE: CPT | Mod: 26,MA

## 2023-08-14 PROCEDURE — 71045 X-RAY EXAM CHEST 1 VIEW: CPT | Mod: 26

## 2023-08-14 PROCEDURE — 70498 CT ANGIOGRAPHY NECK: CPT | Mod: 26,MA

## 2023-08-14 PROCEDURE — 99223 1ST HOSP IP/OBS HIGH 75: CPT

## 2023-08-14 PROCEDURE — 70496 CT ANGIOGRAPHY HEAD: CPT | Mod: 26,MA

## 2023-08-14 PROCEDURE — 70450 CT HEAD/BRAIN W/O DYE: CPT | Mod: 26,MA,59

## 2023-08-14 RX ORDER — MECLIZINE HCL 12.5 MG
25 TABLET ORAL THREE TIMES A DAY
Refills: 0 | Status: DISCONTINUED | OUTPATIENT
Start: 2023-08-14 | End: 2023-08-24

## 2023-08-14 RX ORDER — ACETAMINOPHEN 500 MG
650 TABLET ORAL ONCE
Refills: 0 | Status: COMPLETED | OUTPATIENT
Start: 2023-08-14 | End: 2023-08-14

## 2023-08-14 RX ORDER — MECLIZINE HCL 12.5 MG
25 TABLET ORAL ONCE
Refills: 0 | Status: COMPLETED | OUTPATIENT
Start: 2023-08-14 | End: 2023-08-14

## 2023-08-14 RX ADMIN — Medication 25 MILLIGRAM(S): at 17:15

## 2023-08-14 RX ADMIN — Medication 25 MILLIGRAM(S): at 21:32

## 2023-08-14 RX ADMIN — Medication 650 MILLIGRAM(S): at 23:29

## 2023-08-14 NOTE — ED CDU PROVIDER INITIAL DAY NOTE - PHYSICAL EXAMINATION
· Physical Examination: CONSTITUTIONAL: NAD  	SKIN: Warm, dry  	HEAD: NCAT  	EYES: Clear conjunctiva   	ENT: MMM  	NECK: Supple  	CARD: RRR, S1, S2; no M/R/G  	RESP: Normal respiratory effort, CTAB  	ABD: Soft, nontender. No rebound, rigidity, or guarding  	EXT: Pulses palpable distally  NEURO: AOx3, speaking in full clear sentences, no dysarthria. CN 2-12 grossly intact. No facial droop. No pronator drift. Strength and sensation intact and symmetric in all extremities. Finger to nose and heel to shin abnormal on the left side--NOW RESOLVED

## 2023-08-14 NOTE — ED PROVIDER NOTE - NSICDXPASTMEDICALHX_GEN_ALL_CORE_FT
PAST MEDICAL HISTORY:  Breast CA     GERD (gastroesophageal reflux disease)     Hernia     HTN (hypertension)     Prolapsed uterus

## 2023-08-14 NOTE — ED ADULT NURSE REASSESSMENT NOTE - NS ED NURSE REASSESS COMMENT FT1
Patient received alert and oriented x3 resting in bed. No apparent respiratory distress noted. patient c/o dizziness. Able to make needs known. Fall precautions in place. Will continue to monitor.

## 2023-08-14 NOTE — CONSULT NOTE ADULT - SUBJECTIVE AND OBJECTIVE BOX
NEUROLOGY CONSULT    HPI:     MEDICATIONS  Home Medications:  NIFEdipine:  (2021 12:52)    MEDICATIONS  (STANDING):    MEDICATIONS  (PRN):      FAMILY HISTORY:    SOCIAL HISTORY: negative for tobacco, alcohol, or ilicit drug use.    Allergies    Bactrim (Hives)  penicillin (Short breath; Hives)    Intolerances    Percocet 5/325 (Other)      GEN: NAD, pleasant, cooperative    NEURO:   MENTAL STATUS: AAOx3  LANG/SPEECH: Fluent, intact naming, repetition & comprehension  CRANIAL NERVES:  II: Pupils equal and reactive, no RAPD, normal visual field and fundus  III, IV, VI: EOM intact, no gaze preference or deviation  V: normal  VII: no facial asymmetry  VIII: normal hearing to speech  MOTOR: 5/5 in both upper and lower extremities  REFLEXES: 2/4 throughout, bilateral flexor plantars  SENSORY: Normal to touch, temperature & pin prick in all extremiteis  COORD: Normal finger to nose and heel to shin, no tremor, no dysmetria  Gait:   NIHSS: **** ASPECT Score: ***** ICH Score: ****** (GCS)    LABS:                        13.9   13.69 )-----------( 362      ( 14 Aug 2023 10:16 )             42.5     08-14    140  |  103  |  21<H>  ----------------------------<  96  TNP   |  22  |  1.4    Ca    10.2      14 Aug 2023 10:16    TPro  7.7  /  Alb  4.4  /  TBili  0.3  /  DBili  x   /  AST  47<H>  /  ALT  13  /  AlkPhos  59  08-14    Hemoglobin A1C:   Vitamin B12     CAPILLARY BLOOD GLUCOSE          Urinalysis Basic - ( 14 Aug 2023 11:37 )    Color: Dark Yellow / Appearance: Cloudy / S.022 / pH: x  Gluc: x / Ketone: Trace mg/dL  / Bili: Negative / Urobili: 1.0 mg/dL   Blood: x / Protein: 100 mg/dL / Nitrite: Negative   Leuk Esterase: Moderate / RBC: 3 /HPF / WBC 17 /HPF   Sq Epi: x / Non Sq Epi: 10 /HPF / Bacteria: Few /HPF            Microbiology:      RADIOLOGY, EKG AND ADDITIONAL TESTS: Reviewed.           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 She presented today because she told her nieces about the dizziness and they were concerned she might fall and told her to come to the ED. She complains of one week of progressive dizziness, causing her to hold on to the walls at her home. She previously was walking unassisted without a cane or walker and performing all ADLs. She admits nausea, headaches, and dizziness for the past 1 week upon wakening and sitting up in bed. She admits that she has not taken her blood pressure medication for the past 1 week because she has not been feeling well. She does not check her BP at home. She had a recent episode of diverticulitis 2 weeks ago with significant lower abdominal pain and constipation, treated by PCP with Flagyl and ciprofloxacin and Miralax, which has now resolved. She admits to eating and drinking less than usual recently. She admits that her legs feel heavy recently and notes some tremor with hands and legs during exertion. ROS was negative for visual disturbance, diplopia, hearing loss, fever, chills, abdominal pain, chest pain, palpitations, vomiting, diarrhea, LE swelling.      MEDICATIONS  Home Medications:  NIFEdipine:  (2021 12:52)  -Toprol XL 50 mg  -Previcid     MEDICATIONS  (STANDING):    MEDICATIONS  (PRN):      FAMILY HISTORY: Denies significant family hx relevant to chief complaint.    SOCIAL HISTORY: Negative for tobacco, alcohol, or ilicit drug use.    Allergies    Bactrim (Hives)  Penicillin (Short breath; Hives)    Intolerances    Percocet 5/325 (Other)      GEN: NAD, pleasant, cooperative    NEURO:   MENTAL STATUS: AAOx3, good fund of knowledge, attention, memory (recent), mood and affect are appropriate   LANG/SPEECH: Fluent, intact naming, repetition & comprehension, no aphasia   CRANIAL NERVES:  II: Pupils equal and reactive, no RAPD, normal visual fields  III, IV, VI: EOM intact and nonpainful, no gaze preference or deviation  V: Intact bilaterally to light touch over V1, V2, V3, no masseter or temporalis wasting  VII: Symmetric eyebrow raise and smile, can puff out cheeks, close eyes tightly against resistance  VIII: Intact to finger rub bilaterally can localize sound    MOTOR: 4/5 in bilateral upper extremities to shoulder abduction/adduction, elbow flexion/extension, wrist flexion/extension, . Mild tremor noted when exerting herself against resistance.  3/5 in bilateral lower extremities to hip flexion/extension, knee flexion/extension, dorsiflexion/plantarflexion. Tremor noted when exerting herself. No ankle clonus noted bilaterally.  Intact to FFM bilaterally.    REFLEXES: 2+/4+ throughout, bilateral flexor plantars, negative heller bilaterally    SENSORY: Intact to Light touch over the bilateral upper and lower extremities. No extinction to double simultaneous stimulation.  Romberg positive    COORD: Normal finger to nose. Unable to perform HTS. No DDK    Gait: Unable to support herself to stand, significant balance issues with shaking in the lower extremities. Admits dizziness on standing. Able to take two steps forward and two steps back.       LABS:                        13.9   13.69 )-----------( 362      ( 14 Aug 2023 10:16 )             42.5     08-14    140  |  103  |  21<H>  ----------------------------<  96  TNP   |  22  |  1.4    Ca    10.2      14 Aug 2023 10:16    TPro  7.7  /  Alb  4.4  /  TBili  0.3  /  DBili  x   /  AST  47<H>  /  ALT  13  /  AlkPhos  59  08-14    Hemoglobin A1C:   Vitamin B12     CAPILLARY BLOOD GLUCOSE          Urinalysis Basic - ( 14 Aug 2023 11:37 )    Color: Dark Yellow / Appearance: Cloudy / S.022 / pH: x  Gluc: x / Ketone: Trace mg/dL  / Bili: Negative / Urobili: 1.0 mg/dL   Blood: x / Protein: 100 mg/dL / Nitrite: Negative   Leuk Esterase: Moderate / RBC: 3 /HPF / WBC 17 /HPF   Sq Epi: x / Non Sq Epi: 10 /HPF / Bacteria: Few /HPF            Microbiology:      RADIOLOGY, EKG AND ADDITIONAL TESTS: Reviewed.

## 2023-08-14 NOTE — CONSULT NOTE ADULT - ASSESSMENT
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 Given her exam which was non focal, and her history with symptoms associated with change in position, the etiology could be 2/2 BPPV vs. orthostatic hypotension.     #BPPV vs. Orthostatic hypotension likely secondary to age, dehydration  -Start meclizine 25mg TID   -Admit to ED observation  -Obtain orthostatic VS  -MRI Brain without contrast  - PT evaluation  - call for new or worsening neuro status

## 2023-08-14 NOTE — ED PROVIDER NOTE - CONSIDERATION OF ADMISSION OBSERVATION
Normal left ventricle structure and function.   Ejection fraction is visually estimated at 55-60%.   No significant valvular disease noted.   Mild pulmonary hypertension at 42 mmHg.   No evidence of pericardial effusion. No evidence of significant venous insufficiency noted in the bilateral lower    extremities.    No evidence of DVT or SVT in the bilateral common femoral vein, femoral    vein, popliteal vein, greater saphenous vein or small saphenous vein.        Recommendations        optimize medications       LM on VM advising patient of normal testing results. Consideration of Admission/Observation pt admitted

## 2023-08-14 NOTE — ED CDU PROVIDER INITIAL DAY NOTE - CLINICAL SUMMARY MEDICAL DECISION MAKING FREE TEXT BOX
86 y/o F PMHx PMHx HTN, breast cancer and lymphoma s/p surgery in 2004, MS, glaucoma, presenting for worsening lightheadedness and nausea x1 week and ? subjective LLE weakness. Stopped her BP meds at home 1 week ago because she thought her BP was low which it wasn't. NO trauma, no other complaints.    On exam, pt is hypertensive, her neuro exam was notable for some dysmetria on the LUE which resolved during ED stay.     Pt had labs and imaging obtained here which were unremarkable except for age-indeterminate ischemic changes on CTH. UA contaminated and pt without urinary sxs. Pt was seen by neuro who recommended the following:       Per neuro recs:   -Start meclizine 25mg TID   -Admit to ED observation  -Obtain orthostatic VS  -MRI Brain without contrast  - PT evaluation  - call for new or worsening neuro status    Pt admitted to obs

## 2023-08-14 NOTE — ED CDU PROVIDER INITIAL DAY NOTE - OBJECTIVE STATEMENT
87yoF PMHx PMHx HTN, breast cancer and lymphoma s/p surgery in 2004, glaucoma, presenting for worsening dizziness and nausea over the past week. Dizziness is described as feeling lightheaded, present at rest, worse whenever she moves or walks around, and in the past few days has forced her to hold onto the walls when she is walking around her house. Pt reports feeling more wobbly on her left leg. Pt thought she might be dizzy due to htn, and stopped taking her blood pressure medicine about a week ago. Denies chest pain, vision changes, SOB, cough, congestion, ear pain, dysuria, hematuria, abdominal pain, vomiting, diarrhea

## 2023-08-14 NOTE — ED PROVIDER NOTE - OBJECTIVE STATEMENT
87yoF PMHx 87yoF PMHx PMHx HTN, breast cancer and lymphoma s/p surgery in 2004, glaucoma, presenting for worsening dizziness and nausea over the past week. Dizziness is described as feeling lightheaded, present at rest, worse whenever she moves or walks around, and in the past few days has forced her to hold onto the walls when she is walking around her house. Pt reports feeling more wobbly on her left leg. Pt thought she might be dizzy due to htn, and stopped taking her blood pressure medicine about a week ago. Denies chest pain, vision changes, SOB, cough, congestion, ear pain, dysuria, hematuria, abdominal pain, vomiting, diarrhea

## 2023-08-14 NOTE — ED PROVIDER NOTE - CLINICAL SUMMARY MEDICAL DECISION MAKING FREE TEXT BOX
86 y/o F h/o HTN, breast cancer and lymphoma s/p surgery in 2004, glaucoma, MS, presenting lightheadedness, nausea, unsteady gait x1 week. Stopped her BP meds because she thought BP was low and possibly contributing to her sxs. Also c/o LLE weakness.  Denies chest pain, vision changes, SOB, cough, congestion, ear pain, dysuria, hematuria, abdominal pain, vomiting, diarrhea    Stroke note NOT activated as pt out of therapeutic/interventional window    On exam, VSS. She is hypertensive. Neuro exam was notable for dysmetria to the LUE, weakness LLE which resolved during her ED stay. She had labs and CT imaging in the ER. Labs showed mild leukocytosis, UA is contaminated and pt without urinary sxs so would wait for culture to treat. CT showing progression of ischemic changes, unknown chronicity.     Neuro was consulted who recommended ED obs admission, MRI, meclizine

## 2023-08-14 NOTE — ED PROVIDER NOTE - PHYSICAL EXAMINATION
CONSTITUTIONAL: NAD  SKIN: Warm, dry  HEAD: NCAT  EYES: Clear conjunctiva   ENT: MMM  NECK: Supple  CARD: RRR, S1, S2; no M/R/G  RESP: Normal respiratory effort, CTAB  ABD: Soft, nontender. No rebound, rigidity, or guarding  EXT: Pulses palpable distally  NEURO: AOx3, speaking in full clear sentences, no dysarthria. CN 2-12 grossly intact. No facial droop. No pronator drift. Strength and sensation intact and symmetric in all extremities. Finger to nose and heel to shin abnormal on the left side.

## 2023-08-15 ENCOUNTER — APPOINTMENT (OUTPATIENT)
Dept: UROGYNECOLOGY | Facility: CLINIC | Age: 88
End: 2023-08-15

## 2023-08-15 DIAGNOSIS — R42 DIZZINESS AND GIDDINESS: ICD-10-CM

## 2023-08-15 LAB
ALBUMIN SERPL ELPH-MCNC: 3.9 G/DL — SIGNIFICANT CHANGE UP (ref 3.5–5.2)
ALP SERPL-CCNC: 57 U/L — SIGNIFICANT CHANGE UP (ref 30–115)
ALT FLD-CCNC: 10 U/L — SIGNIFICANT CHANGE UP (ref 0–41)
ANION GAP SERPL CALC-SCNC: 12 MMOL/L — SIGNIFICANT CHANGE UP (ref 7–14)
AST SERPL-CCNC: 27 U/L — SIGNIFICANT CHANGE UP (ref 0–41)
BASOPHILS # BLD AUTO: 0.1 K/UL — SIGNIFICANT CHANGE UP (ref 0–0.2)
BASOPHILS NFR BLD AUTO: 0.8 % — SIGNIFICANT CHANGE UP (ref 0–1)
BILIRUB SERPL-MCNC: 0.3 MG/DL — SIGNIFICANT CHANGE UP (ref 0.2–1.2)
BUN SERPL-MCNC: 18 MG/DL — SIGNIFICANT CHANGE UP (ref 10–20)
CALCIUM SERPL-MCNC: 10 MG/DL — SIGNIFICANT CHANGE UP (ref 8.4–10.5)
CHLORIDE SERPL-SCNC: 105 MMOL/L — SIGNIFICANT CHANGE UP (ref 98–110)
CO2 SERPL-SCNC: 25 MMOL/L — SIGNIFICANT CHANGE UP (ref 17–32)
CREAT SERPL-MCNC: 1.2 MG/DL — SIGNIFICANT CHANGE UP (ref 0.7–1.5)
CULTURE RESULTS: SIGNIFICANT CHANGE UP
EGFR: 44 ML/MIN/1.73M2 — LOW
EOSINOPHIL # BLD AUTO: 0.08 K/UL — SIGNIFICANT CHANGE UP (ref 0–0.7)
EOSINOPHIL NFR BLD AUTO: 0.6 % — SIGNIFICANT CHANGE UP (ref 0–8)
GLUCOSE SERPL-MCNC: 147 MG/DL — HIGH (ref 70–99)
HCT VFR BLD CALC: 41.5 % — SIGNIFICANT CHANGE UP (ref 37–47)
HGB BLD-MCNC: 13.3 G/DL — SIGNIFICANT CHANGE UP (ref 12–16)
IMM GRANULOCYTES NFR BLD AUTO: 0.6 % — HIGH (ref 0.1–0.3)
LYMPHOCYTES # BLD AUTO: 1.91 K/UL — SIGNIFICANT CHANGE UP (ref 1.2–3.4)
LYMPHOCYTES # BLD AUTO: 15.5 % — LOW (ref 20.5–51.1)
MAGNESIUM SERPL-MCNC: 1.7 MG/DL — LOW (ref 1.8–2.4)
MCHC RBC-ENTMCNC: 28.2 PG — SIGNIFICANT CHANGE UP (ref 27–31)
MCHC RBC-ENTMCNC: 32 G/DL — SIGNIFICANT CHANGE UP (ref 32–37)
MCV RBC AUTO: 88.1 FL — SIGNIFICANT CHANGE UP (ref 81–99)
MONOCYTES # BLD AUTO: 1.05 K/UL — HIGH (ref 0.1–0.6)
MONOCYTES NFR BLD AUTO: 8.5 % — SIGNIFICANT CHANGE UP (ref 1.7–9.3)
NEUTROPHILS # BLD AUTO: 9.1 K/UL — HIGH (ref 1.4–6.5)
NEUTROPHILS NFR BLD AUTO: 74 % — SIGNIFICANT CHANGE UP (ref 42.2–75.2)
NRBC # BLD: 0 /100 WBCS — SIGNIFICANT CHANGE UP (ref 0–0)
PLATELET # BLD AUTO: 313 K/UL — SIGNIFICANT CHANGE UP (ref 130–400)
PMV BLD: 10.9 FL — HIGH (ref 7.4–10.4)
POTASSIUM SERPL-MCNC: 4 MMOL/L — SIGNIFICANT CHANGE UP (ref 3.5–5)
POTASSIUM SERPL-SCNC: 4 MMOL/L — SIGNIFICANT CHANGE UP (ref 3.5–5)
PROT SERPL-MCNC: 6.7 G/DL — SIGNIFICANT CHANGE UP (ref 6–8)
RBC # BLD: 4.71 M/UL — SIGNIFICANT CHANGE UP (ref 4.2–5.4)
RBC # FLD: 13.8 % — SIGNIFICANT CHANGE UP (ref 11.5–14.5)
SODIUM SERPL-SCNC: 142 MMOL/L — SIGNIFICANT CHANGE UP (ref 135–146)
SPECIMEN SOURCE: SIGNIFICANT CHANGE UP
WBC # BLD: 12.32 K/UL — HIGH (ref 4.8–10.8)
WBC # FLD AUTO: 12.32 K/UL — HIGH (ref 4.8–10.8)

## 2023-08-15 PROCEDURE — 97110 THERAPEUTIC EXERCISES: CPT | Mod: GP

## 2023-08-15 PROCEDURE — 84145 PROCALCITONIN (PCT): CPT

## 2023-08-15 PROCEDURE — 86901 BLOOD TYPING SEROLOGIC RH(D): CPT

## 2023-08-15 PROCEDURE — 83735 ASSAY OF MAGNESIUM: CPT

## 2023-08-15 PROCEDURE — 85027 COMPLETE CBC AUTOMATED: CPT

## 2023-08-15 PROCEDURE — 85379 FIBRIN DEGRADATION QUANT: CPT

## 2023-08-15 PROCEDURE — 80048 BASIC METABOLIC PNL TOTAL CA: CPT

## 2023-08-15 PROCEDURE — 85025 COMPLETE CBC W/AUTO DIFF WBC: CPT

## 2023-08-15 PROCEDURE — 87186 SC STD MICRODIL/AGAR DIL: CPT

## 2023-08-15 PROCEDURE — 99222 1ST HOSP IP/OBS MODERATE 55: CPT

## 2023-08-15 PROCEDURE — 74018 RADEX ABDOMEN 1 VIEW: CPT

## 2023-08-15 PROCEDURE — 80053 COMPREHEN METABOLIC PANEL: CPT

## 2023-08-15 PROCEDURE — 82565 ASSAY OF CREATININE: CPT

## 2023-08-15 PROCEDURE — 73560 X-RAY EXAM OF KNEE 1 OR 2: CPT | Mod: 50

## 2023-08-15 PROCEDURE — 99233 SBSQ HOSP IP/OBS HIGH 50: CPT

## 2023-08-15 PROCEDURE — 97162 PT EVAL MOD COMPLEX 30 MIN: CPT | Mod: GP

## 2023-08-15 PROCEDURE — 84156 ASSAY OF PROTEIN URINE: CPT

## 2023-08-15 PROCEDURE — 84443 ASSAY THYROID STIM HORMONE: CPT

## 2023-08-15 PROCEDURE — 97530 THERAPEUTIC ACTIVITIES: CPT | Mod: GP

## 2023-08-15 PROCEDURE — 87040 BLOOD CULTURE FOR BACTERIA: CPT

## 2023-08-15 PROCEDURE — 86140 C-REACTIVE PROTEIN: CPT

## 2023-08-15 PROCEDURE — 83935 ASSAY OF URINE OSMOLALITY: CPT

## 2023-08-15 PROCEDURE — 93005 ELECTROCARDIOGRAM TRACING: CPT

## 2023-08-15 PROCEDURE — 83605 ASSAY OF LACTIC ACID: CPT

## 2023-08-15 PROCEDURE — 82607 VITAMIN B-12: CPT

## 2023-08-15 PROCEDURE — 84133 ASSAY OF URINE POTASSIUM: CPT

## 2023-08-15 PROCEDURE — 82533 TOTAL CORTISOL: CPT

## 2023-08-15 PROCEDURE — 86900 BLOOD TYPING SEROLOGIC ABO: CPT

## 2023-08-15 PROCEDURE — 84540 ASSAY OF URINE/UREA-N: CPT

## 2023-08-15 PROCEDURE — 82570 ASSAY OF URINE CREATININE: CPT

## 2023-08-15 PROCEDURE — 99497 ADVNCD CARE PLAN 30 MIN: CPT | Mod: 25

## 2023-08-15 PROCEDURE — 71045 X-RAY EXAM CHEST 1 VIEW: CPT

## 2023-08-15 PROCEDURE — 0225U NFCT DS DNA&RNA 21 SARSCOV2: CPT

## 2023-08-15 PROCEDURE — 94640 AIRWAY INHALATION TREATMENT: CPT

## 2023-08-15 PROCEDURE — 87086 URINE CULTURE/COLONY COUNT: CPT

## 2023-08-15 PROCEDURE — 82746 ASSAY OF FOLIC ACID SERUM: CPT

## 2023-08-15 PROCEDURE — 93970 EXTREMITY STUDY: CPT

## 2023-08-15 PROCEDURE — 84100 ASSAY OF PHOSPHORUS: CPT

## 2023-08-15 PROCEDURE — 81001 URINALYSIS AUTO W/SCOPE: CPT

## 2023-08-15 PROCEDURE — 84300 ASSAY OF URINE SODIUM: CPT

## 2023-08-15 PROCEDURE — 82962 GLUCOSE BLOOD TEST: CPT

## 2023-08-15 PROCEDURE — 82728 ASSAY OF FERRITIN: CPT

## 2023-08-15 PROCEDURE — 86850 RBC ANTIBODY SCREEN: CPT

## 2023-08-15 PROCEDURE — 36415 COLL VENOUS BLD VENIPUNCTURE: CPT

## 2023-08-15 PROCEDURE — 93880 EXTRACRANIAL BILAT STUDY: CPT

## 2023-08-15 PROCEDURE — 80076 HEPATIC FUNCTION PANEL: CPT

## 2023-08-15 RX ORDER — NIFEDIPINE 30 MG
30 TABLET, EXTENDED RELEASE 24 HR ORAL DAILY
Refills: 0 | Status: DISCONTINUED | OUTPATIENT
Start: 2023-08-15 | End: 2023-08-26

## 2023-08-15 RX ORDER — MAGNESIUM SULFATE 500 MG/ML
2 VIAL (ML) INJECTION ONCE
Refills: 0 | Status: COMPLETED | OUTPATIENT
Start: 2023-08-15 | End: 2023-08-15

## 2023-08-15 RX ORDER — NIFEDIPINE 30 MG
0 TABLET, EXTENDED RELEASE 24 HR ORAL
Qty: 0 | Refills: 0 | DISCHARGE

## 2023-08-15 RX ORDER — CIPROFLOXACIN LACTATE 400MG/40ML
500 VIAL (ML) INTRAVENOUS ONCE
Refills: 0 | Status: COMPLETED | OUTPATIENT
Start: 2023-08-15 | End: 2023-08-15

## 2023-08-15 RX ORDER — METOPROLOL TARTRATE 50 MG
50 TABLET ORAL DAILY
Refills: 0 | Status: DISCONTINUED | OUTPATIENT
Start: 2023-08-15 | End: 2023-08-17

## 2023-08-15 RX ORDER — METOPROLOL TARTRATE 50 MG
1 TABLET ORAL
Refills: 0 | DISCHARGE

## 2023-08-15 RX ORDER — CIPROFLOXACIN LACTATE 400MG/40ML
500 VIAL (ML) INTRAVENOUS EVERY 12 HOURS
Refills: 0 | Status: DISCONTINUED | OUTPATIENT
Start: 2023-08-15 | End: 2023-08-16

## 2023-08-15 RX ORDER — ENOXAPARIN SODIUM 100 MG/ML
40 INJECTION SUBCUTANEOUS EVERY 24 HOURS
Refills: 0 | Status: DISCONTINUED | OUTPATIENT
Start: 2023-08-15 | End: 2023-08-18

## 2023-08-15 RX ORDER — FAMOTIDINE 10 MG/ML
1 INJECTION INTRAVENOUS
Refills: 0 | DISCHARGE

## 2023-08-15 RX ORDER — FAMOTIDINE 10 MG/ML
20 INJECTION INTRAVENOUS DAILY
Refills: 0 | Status: DISCONTINUED | OUTPATIENT
Start: 2023-08-15 | End: 2023-08-29

## 2023-08-15 RX ADMIN — ENOXAPARIN SODIUM 40 MILLIGRAM(S): 100 INJECTION SUBCUTANEOUS at 16:37

## 2023-08-15 RX ADMIN — Medication 25 MILLIGRAM(S): at 16:33

## 2023-08-15 RX ADMIN — Medication 25 MILLIGRAM(S): at 05:34

## 2023-08-15 RX ADMIN — Medication 25 GRAM(S): at 16:33

## 2023-08-15 RX ADMIN — Medication 500 MILLIGRAM(S): at 10:25

## 2023-08-15 NOTE — H&P ADULT - ATTENDING COMMENTS
DAVY HOLDEN   ------------------------------  The patient was seen and evaluated for COPD.  The patient is in no acute distress.  Denied any chest pain, palpitations, shortness of breath, or abdominal pain.  Offers no complaints. Confused.    Vital Signs Last 24 Hrs  T(C): 36.5, Max: 36.7 (06-10 @ 16:11)  T(F): 97.7, Max: 98.1 (06-10 @ 16:11)  HR: 77 (59 - 88)  BP: 108/80 (102/77 - 110/70)  BP(mean): --  RR: 26 (18 - 26)  SpO2: 92% (90% - 100%)    PHYSICAL EXAMINATION:  ----------------------------------------  General appearance: NAD, Awake, Alert  HEENT: NCAT, Conjunctiva clear, EOMI, Pupils reactive  Neck: Supple, No JVD, No tenderness  Lungs: Clear to auscultation, Breath sound equal bilaterally, No rales, No wheeze  Cardiovascular: S1S2, Regular rhythm  Abdomen: Soft, Nontender, Nondistended, No guarding/rebound, Positive bowel sounds  Extremities: No clubbing, No cyanosis, No edema, No calf tenderness  Neuro: Strength equal bilaterally, Minimal tremors    RECENT CULTURES:  06-05 .Blood Blood-Peripheral XXXX XXXX   No growth at 5 days.    MEDICATIONS  (STANDING):  enoxaparin Injectable 40milliGRAM(s) SubCutaneous daily  ALBUTerol/ipratropium for Nebulization 3milliLiter(s) Nebulizer every 6 hours  lisinopril 10milliGRAM(s) Oral daily  benztropine 1milliGRAM(s) Oral two times a day  rOPINIRole 0.5milliGRAM(s) Oral two times a day  haloperidol     Tablet 10milliGRAM(s) Oral at bedtime  risperiDONE   Tablet 4milliGRAM(s) Oral daily  carvedilol 3.125milliGRAM(s) Oral every 12 hours  amLODIPine   Tablet 5milliGRAM(s) Oral daily  predniSONE   Tablet 40milliGRAM(s) Oral daily  levETIRAcetam 500milliGRAM(s) Oral two times a day  senna 1Tablet(s) Oral daily  ketoconazole 2% Cream 1Application(s) Topical daily    MEDICATIONS  (PRN):  ALBUTerol/ipratropium for Nebulization 3milliLiter(s) Nebulizer every 4 hours PRN Shortness of Breath and/or Wheezing  guaiFENesin    Syrup 200milliGRAM(s) Oral every 6 hours PRN Cough      ASSESSMENT / PLAN:  -----------------------------------  COPD exacerbation - Pulmonary follow up noted. On prednisone and inhaled albuterol/ipratropium. Oxygen saturation was 92% at rest on room air. The patient is not allowing continuous pulse oximetry monitoring.    Parainfluenza - Supportive care.    Schizophrenia - On ropinirole, haloperidol, risperidone, and benztropine. Psychiatry consultation noted. On constant observation.    Seizure disorder - On levetiracetam    Constipation - Senna initiated. HPI as above.  Interval history: Pt seen and examined at bedside. No cp or sob.   Vital Signs (24 Hrs):  T(C): 36.6 (08-15-23 @ 12:58), Max: 36.8 (08-15-23 @ 08:34)  HR: 72 (08-15-23 @ 12:58) (71 - 72)  BP: 141/73 (08-15-23 @ 12:58) (141/73 - 154/74)  RR: 18 (08-15-23 @ 12:58) (18 - 18)  SpO2: 97% (08-15-23 @ 12:58) (97% - 98%)  Wt(kg): --  Daily     Daily     I&O's Summary    PHYSICAL EXAM:  GENERAL: NAD, well-developed  HEAD:  Atraumatic, Normocephalic  EYES: EOMI, PERRLA, conjunctiva and sclera clear  NECK: Supple, No JVD  CHEST/LUNG: Clear to auscultation bilaterally; No wheeze  HEART: Regular rate and rhythm; No murmurs, rubs, or gallops  ABDOMEN: Soft, Nontender, Nondistended; Bowel sounds present  EXTREMITIES:  2+ Peripheral Pulses, No clubbing, cyanosis, or edema  PSYCH: AAOx3  NEUROLOGY: non-focal  SKIN: No rashes or lesions  Labs reviewed  Imaging reviewed independently and reviewed read  EKG reviewed independently and reviewed read    Plan as above dw resident in real time. edits made    #Progress Note Handoff  Pending (specify):  UClx. rehab   Family discussion: house staff updated pt family  Disposition:   Decision to admit the pt is based on acuity as above
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
## 2023-08-15 NOTE — H&P ADULT - HISTORY OF PRESENT ILLNESS
86yo F PMHx HTN, breast cancer and lymphoma s/p surgery in 2004, glaucoma, presenting for worsening dizziness and unsteadiness over the past week. Dizziness is described as feeling lightheaded, present at rest, worse whenever she bends down to get something, and in the past few days has forced her to hold onto the walls when she is walking around her house. Patient reports feeling more weakness in her legs but no numbness. Denies chest pain, vision changes, SOB, cough, congestion, ear pain, dysuria, hematuria, abdominal pain, vomiting, diarrhea. No recent sickness.     She was admitted to observation  vital signs showed high /90.   negative orthostatics  PT worked with her and recommended Rehab facility   Decision were made that she's not safe for discharge, need to be admitted for possible placement

## 2023-08-15 NOTE — PHYSICAL THERAPY INITIAL EVALUATION ADULT - GENERAL OBSERVATIONS, REHAB EVAL
10:05-10:30am Pt encountered supine lying in a stretcher bed, A & O x 3 in NAD, no c/o pain and agreeable to PT. Pt requires Min A in bed mobility and sit/stand transfer. Pt performed standing balance and unable to ambulate at this time due to c/o dizziness during mobility. Dizziness resolves when lying down in bed as per pt, /80, HR 99, RN Marisa present at b/s. Pt will benefit from skilled PT 3-5x/wk for thera ex, functional mobility training, balance and gait training.

## 2023-08-15 NOTE — ED CDU PROVIDER SUBSEQUENT DAY NOTE - DIFFERENTIAL DIAGNOSIS
Differential Diagnosis The differential diagnosis for patients clinical presentation includes but is not limited to:  orthostatic hypotension, vertigo, age related dizziness, infection

## 2023-08-15 NOTE — H&P ADULT - NSHPREVIEWOFSYSTEMS_GEN_ALL_CORE
REVIEW OF SYSTEMS:    CONSTITUTIONAL: No weakness, fevers or chills  EYES/ENT: in HPI   NECK: No pain or stiffness  RESPIRATORY: No cough, wheezing, hemoptysis; No shortness of breath  CARDIOVASCULAR: No chest pain or palpitations, no lower limb edema  GASTROINTESTINAL: No abdominal or epigastric pain. No nausea, vomiting, or hematemesis; No diarrhea or constipation. No melena or hematochezia.  GENITOURINARY: No dysuria, frequency or hematuria  NEUROLOGICAL: No numbness. bilateral lower limb weakness   SKIN: No itching, rashes  MUSCOLOSKELETAL: no arthralgia or arthritis

## 2023-08-15 NOTE — ED CDU PROVIDER SUBSEQUENT DAY NOTE - ATTENDING APP SHARED VISIT CONTRIBUTION OF CARE
I personally evaluated patient. I agree with the findings and plan with all documentation on chart except as documented  in my note.    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 Patient had CT scan and imaging done, neuro evaluation. Orthostatic VS done and are unremarkable.    Patient had MRI performed and this shows microvascular changes but has no acute findings. Patient did not improve with fluids and Meclizine. Attempted to ambulate patient but she is not steady. She is unsafe to be discharged and lives home alone. PT consult placed and will upgrade to an admission.    ED/OBS work up reviewed and results and plan of care discussed with patient. Patient requires admission for further work up, monitoring, and management. Need for admission discussed with patient.

## 2023-08-15 NOTE — H&P ADULT - NSHPPHYSICALEXAM_GEN_ALL_CORE
PHYSICAL EXAM:  GENERAL: NAD  EYES: conjunctiva and sclera clear  ENMT: No tonsillar erythema, exudates, or enlargement; Moist mucous membranes  NECK: Supple, No JVD, Normal thyroid  HEART: Regular rate and rhythm; No murmurs, rubs, or gallops, Normal S1 S2   RESPIRATORY: Clear to auscultation bilaterally, resonant percussion note, no added sounds   ABDOMEN: Soft, Nontender, Nondistended; Bowel sounds present  NEUROLOGY: A&Ox3, grossly intact power and sensation in upper limbs. Bilateral lower limb weakness 3/5 on knee and hip flexion   EXTREMITIES:  2+ Peripheral Pulses, No clubbing, cyanosis, or edema  SKIN: warm, dry, normal color, no rash or abnormal lesions

## 2023-08-15 NOTE — H&P ADULT - ASSESSMENT
86yo F PMHx HTN, breast cancer and lymphoma s/p surgery in 2004, glaucoma, presenting for worsening dizziness and unsteadiness over the past week.       # Dizziness and Unsteadiness   # Bilateral lower limb weakness   - Still complaining from dizziness upon moving or standing   - negative orthostatics   - Labs unremarkable  - CT head showed: Mild progression of cerebral atrophy. Mild progression of periventricular and internal and external capsule hypodensities likely representing ischemic change, age indeterminant.  - MR Head No Cont No evidence of acute infarct. Moderate chronic microvascular ischemic changes.  - Neurology evaluated her, their impression is orthostatics as brain imaging is negative and the dizziness is worse with movement   - PT evaluated her: recommended Rehab facility   -  for placement, I discussed with the patient that it is not safe to discharge her home due to high risk of fall that can lead to more complications   - Send TSH, folate, B12   - started on meclizine 25X3       # Positive UA  - no urinary symptoms   - WBC 12K  - follow up urine cultures     # HTN   - On home nifedipine and metoprolol ER  - continue home medications     # GERD   - continue famotidine       # GI prophylaxis: famotidine  # DVT prophylaxis: Lovenox  # Full code  # Diet: regular  86yo F PMHx HTN, breast cancer and lymphoma s/p surgery in 2004, glaucoma, presenting for worsening dizziness and unsteadiness over the past week.       # Dizziness and Unsteadiness   # Bilateral lower limb weakness   - Still complaining from dizziness upon moving or standing   - negative orthostatics   - Labs unremarkable  - CT head showed: Mild progression of cerebral atrophy. Mild progression of periventricular and internal and external capsule hypodensity likely representing ischemic change, age indeterminant.  - MR Head No Cont No evidence of acute infarct. Moderate chronic microvascular ischemic changes.  - Neurology evaluated her, their impression is orthostatics as brain imaging is negative and the dizziness is worse with movement   - PT evaluated her: recommended Rehab facility   -  for placement, I discussed with the patient that it is not safe to discharge her home due to high risk of fall that can lead to more complications   - Send TSH, folate, B12   - started on meclizine 25 mg X3   - check duplex       # Positive UA  - dysuria  - continue Cipro 500 mg BID   - WBC 12K  - follow up urine cultures     # HTN   - On home nifedipine and metoprolol ER  - continue home medications     # GERD   - continue famotidine       # GI prophylaxis: famotidine  # DVT prophylaxis: Lovenox  # Full code  # Diet: regular

## 2023-08-15 NOTE — ED CDU PROVIDER DISPOSITION NOTE - ATTENDING APP SHARED VISIT CONTRIBUTION OF CARE
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 Patient had CT scan and imaging done, neuro evaluation. Orthostatic VS done and are unremarkable.    Patient had MRI performed and this shows microvascular changes but has no acute findings. Patient did not improve with fluids and Meclizine. Attempted to ambulate patient but she is not steady. She is unsafe to be discharged and lives home alone. PT consult placed and will upgrade to an admission.    ED/OBS work up reviewed and results and plan of care discussed with patient. Patient requires admission for further work up, monitoring, and management. Need for admission discussed with patient.

## 2023-08-15 NOTE — PHYSICAL THERAPY INITIAL EVALUATION ADULT - PERTINENT HX OF CURRENT PROBLEM, REHAB EVAL
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 Patient had CT scan and imaging done, neuro evaluation. Orthostatic VS done and are unremarkable. Patient had MRI performed and this shows microvascular changes but has no acute findings. Patient did not improve with fluids and Meclizine. Attempted to ambulate patient but she is not steady. She is unsafe to be discharged and lives home alone. PT consult placed and will upgrade to an admission.

## 2023-08-15 NOTE — PHYSICAL THERAPY INITIAL EVALUATION ADULT - IMPAIRED TRANSFERS: SIT/STAND, REHAB EVAL
dizziness/impaired balance/decreased strength
Render Note In Bullet Format When Appropriate: No
Detail Level: Simple
Consent: The patient's consent was obtained including but not limited to risks of crusting, scabbing, blistering, scarring, darker or lighter pigmentary change, recurrence, incomplete removal and infection.
Number Of Freeze-Thaw Cycles: 2 freeze-thaw cycles
Duration Of Freeze Thaw-Cycle (Seconds): 15
Post-Care Instructions: I reviewed with the patient in detail post-care instructions. Patient is to wear sunprotection, and avoid picking at any of the treated lesions. Pt may apply Vaseline to crusted or scabbing areas.
Medical Necessity Clause: This procedure was medically necessary because the lesions that were treated were:
Medical Necessity Information: It is in your best interest to select a reason for this procedure from the list below. All of these items fulfill various CMS LCD requirements except the new and changing color options.
Total Number Of Aks Treated: 5
Duration Of Freeze Thaw-Cycle (Seconds): 10
Number Of Freeze-Thaw Cycles: 1 freeze-thaw cycle
Detail Level: Detailed

## 2023-08-16 LAB
ALBUMIN SERPL ELPH-MCNC: 4.1 G/DL — SIGNIFICANT CHANGE UP (ref 3.5–5.2)
ALP SERPL-CCNC: 64 U/L — SIGNIFICANT CHANGE UP (ref 30–115)
ALT FLD-CCNC: 9 U/L — SIGNIFICANT CHANGE UP (ref 0–41)
ANION GAP SERPL CALC-SCNC: 14 MMOL/L — SIGNIFICANT CHANGE UP (ref 7–14)
AST SERPL-CCNC: 24 U/L — SIGNIFICANT CHANGE UP (ref 0–41)
BILIRUB SERPL-MCNC: 0.4 MG/DL — SIGNIFICANT CHANGE UP (ref 0.2–1.2)
BUN SERPL-MCNC: 19 MG/DL — SIGNIFICANT CHANGE UP (ref 10–20)
CALCIUM SERPL-MCNC: 9.7 MG/DL — SIGNIFICANT CHANGE UP (ref 8.4–10.5)
CHLORIDE SERPL-SCNC: 103 MMOL/L — SIGNIFICANT CHANGE UP (ref 98–110)
CO2 SERPL-SCNC: 23 MMOL/L — SIGNIFICANT CHANGE UP (ref 17–32)
CREAT SERPL-MCNC: 1 MG/DL — SIGNIFICANT CHANGE UP (ref 0.7–1.5)
EGFR: 55 ML/MIN/1.73M2 — LOW
FOLATE SERPL-MCNC: 14.4 NG/ML — SIGNIFICANT CHANGE UP
GLUCOSE SERPL-MCNC: 70 MG/DL — SIGNIFICANT CHANGE UP (ref 70–99)
HCT VFR BLD CALC: 43.2 % — SIGNIFICANT CHANGE UP (ref 37–47)
HGB BLD-MCNC: 13.8 G/DL — SIGNIFICANT CHANGE UP (ref 12–16)
MAGNESIUM SERPL-MCNC: 2.2 MG/DL — SIGNIFICANT CHANGE UP (ref 1.8–2.4)
MCHC RBC-ENTMCNC: 28.2 PG — SIGNIFICANT CHANGE UP (ref 27–31)
MCHC RBC-ENTMCNC: 31.9 G/DL — LOW (ref 32–37)
MCV RBC AUTO: 88.3 FL — SIGNIFICANT CHANGE UP (ref 81–99)
NRBC # BLD: 0 /100 WBCS — SIGNIFICANT CHANGE UP (ref 0–0)
PLATELET # BLD AUTO: 312 K/UL — SIGNIFICANT CHANGE UP (ref 130–400)
PMV BLD: 11 FL — HIGH (ref 7.4–10.4)
POTASSIUM SERPL-MCNC: 4.4 MMOL/L — SIGNIFICANT CHANGE UP (ref 3.5–5)
POTASSIUM SERPL-SCNC: 4.4 MMOL/L — SIGNIFICANT CHANGE UP (ref 3.5–5)
PROT SERPL-MCNC: 7.1 G/DL — SIGNIFICANT CHANGE UP (ref 6–8)
RBC # BLD: 4.89 M/UL — SIGNIFICANT CHANGE UP (ref 4.2–5.4)
RBC # FLD: 14 % — SIGNIFICANT CHANGE UP (ref 11.5–14.5)
SODIUM SERPL-SCNC: 140 MMOL/L — SIGNIFICANT CHANGE UP (ref 135–146)
T4 FREE+ TSH PNL SERPL: 2.34 UIU/ML — SIGNIFICANT CHANGE UP (ref 0.27–4.2)
VIT B12 SERPL-MCNC: 810 PG/ML — SIGNIFICANT CHANGE UP (ref 232–1245)
VIT B12 SERPL-MCNC: 898 PG/ML — SIGNIFICANT CHANGE UP (ref 232–1245)
WBC # BLD: 13.36 K/UL — HIGH (ref 4.8–10.8)
WBC # FLD AUTO: 13.36 K/UL — HIGH (ref 4.8–10.8)

## 2023-08-16 PROCEDURE — 99232 SBSQ HOSP IP/OBS MODERATE 35: CPT

## 2023-08-16 RX ORDER — CIPROFLOXACIN LACTATE 400MG/40ML
500 VIAL (ML) INTRAVENOUS EVERY 12 HOURS
Refills: 0 | Status: DISCONTINUED | OUTPATIENT
Start: 2023-08-16 | End: 2023-08-19

## 2023-08-16 RX ORDER — ACETAMINOPHEN 500 MG
650 TABLET ORAL ONCE
Refills: 0 | Status: COMPLETED | OUTPATIENT
Start: 2023-08-16 | End: 2023-08-16

## 2023-08-16 RX ADMIN — Medication 25 MILLIGRAM(S): at 13:14

## 2023-08-16 RX ADMIN — Medication 500 MILLIGRAM(S): at 17:01

## 2023-08-16 RX ADMIN — Medication 25 MILLIGRAM(S): at 21:10

## 2023-08-16 RX ADMIN — FAMOTIDINE 20 MILLIGRAM(S): 10 INJECTION INTRAVENOUS at 11:27

## 2023-08-16 RX ADMIN — Medication 650 MILLIGRAM(S): at 03:27

## 2023-08-16 RX ADMIN — Medication 650 MILLIGRAM(S): at 03:56

## 2023-08-16 RX ADMIN — Medication 25 MILLIGRAM(S): at 05:35

## 2023-08-16 RX ADMIN — Medication 30 MILLIGRAM(S): at 05:35

## 2023-08-16 RX ADMIN — Medication 25 MILLIGRAM(S): at 00:58

## 2023-08-16 RX ADMIN — Medication 50 MILLIGRAM(S): at 05:34

## 2023-08-16 RX ADMIN — Medication 500 MILLIGRAM(S): at 05:35

## 2023-08-16 RX ADMIN — Medication 500 MILLIGRAM(S): at 00:58

## 2023-08-16 RX ADMIN — Medication 650 MILLIGRAM(S): at 18:09

## 2023-08-16 NOTE — CONSULT NOTE ADULT - SUBJECTIVE AND OBJECTIVE BOX
HPI:  88yo F PMHx HTN, breast cancer and lymphoma s/p surgery in 2004, glaucoma, presenting for worsening dizziness and unsteadiness over the past week. Dizziness is described as feeling lightheaded, present at rest, worse whenever she bends down to get something, and in the past few days has forced her to hold onto the walls when she is walking around her house. Patient reports feeling more weakness in her legs but no numbness. Denies chest pain, vision changes, SOB, cough, congestion, ear pain, dysuria, hematuria, abdominal pain, vomiting, diarrhea. No recent sickness.     She was admitted to observation  vital signs showed high /90.   negative orthostatics  PT worked with her and recommended Rehab facility   Decision were made that she's not safe for discharge, need to be admitted for possible placement  (15 Aug 2023 15:49)      PAST MEDICAL & SURGICAL HISTORY:  Prolapsed uterus      Hernia      HTN (hypertension)      GERD (gastroesophageal reflux disease)      Breast CA      No significant past surgical history          Hospital Course:    TODAY'S SUBJECTIVE & REVIEW OF SYMPTOMS:     Constitutional WNL   Cardio WNL   Resp WNL   GI WNL  Heme WNL  Endo WNL  Skin WNL  MSK WNL  Neuro WNL  Cognitive WNL  Psych WNL      MEDICATIONS  (STANDING):  ciprofloxacin     Tablet 500 milliGRAM(s) Oral every 12 hours  enoxaparin Injectable 40 milliGRAM(s) SubCutaneous every 24 hours  famotidine    Tablet 20 milliGRAM(s) Oral daily  meclizine 25 milliGRAM(s) Oral three times a day  metoprolol succinate ER 50 milliGRAM(s) Oral daily  NIFEdipine XL 30 milliGRAM(s) Oral daily    MEDICATIONS  (PRN):      FAMILY HISTORY:  No pertinent family history in first degree relatives        Allergies    Bactrim (Hives)  penicillin (Short breath; Hives)    Intolerances    Percocet 5/325 (Other)      SOCIAL HISTORY:    [  ] Etoh  [  ] Smoking  [  ] Substance abuse     Home Environment:  [ x ] Home Alone  [  ] Lives with Family  [  ] Home Health Aid    Dwelling:  [x  ] Apartment  [  ] Private House  [  ] Adult Home  [  ] Skilled Nursing Facility      [  ] Short Term  [  ] Long Term  [  ] Stairs       Elevator [ x ]    FUNCTIONAL STATUS PTA: (Check all that apply)  Ambulation: [ x  ]Independent    [  ] Dependent     [  ] Non-Ambulatory  Assistive Device: [  ] SA Cane  [  ]  Q Cane  [  ] Walker  [  ]  Wheelchair  ADL : [ x ] Independent  [  ]  Dependent       Vital Signs Last 24 Hrs  T(C): 36.5 (16 Aug 2023 04:18), Max: 36.8 (16 Aug 2023 02:30)  T(F): 97.7 (16 Aug 2023 04:18), Max: 98.3 (16 Aug 2023 02:30)  HR: 70 (16 Aug 2023 04:18) (70 - 86)  BP: 147/78 (16 Aug 2023 04:18) (140/79 - 147/78)  BP(mean): --  RR: 19 (16 Aug 2023 04:18) (17 - 19)  SpO2: 98% (16 Aug 2023 04:18) (95% - 98%)    Parameters below as of 16 Aug 2023 04:18  Patient On (Oxygen Delivery Method): room air          PHYSICAL EXAM: Alert & Oriented X3  GENERAL: NAD, well-groomed, well-developed  HEAD:  Atraumatic, Normocephalic  EYES: EOMI, PERRLA, conjunctiva and sclera clear  NECK: Supple, No JVD, Normal thyroid  CHEST/LUNG: Clear bilaterally; No rales, rhonchi, wheezing, or rubs  HEART: Regular rate and rhythm; No murmurs, rubs, or gallops  ABDOMEN: Soft, Nontender, Nondistended; Bowel sounds present  EXTREMITIES:  2+ Peripheral Pulses, No clubbing, cyanosis, or edema    NERVOUS SYSTEM:  Cranial Nerves 2-12 intact [  ] Abnormal  [  ]  ROM: WFL all extremities [  ]  Abnormal [  ]  Motor Strength: WFL all extremities  [  ]  Abnormal [ x ] hip flex 4-/5; ankle DF/PF 5/5  Sensation: intact to light touch [  ] Abnormal [  ]  Reflexes: Symmetric [  ]  Abnormal [  ]    FUNCTIONAL STATUS:  Bed Mobility: Independent [  ]  Supervision [  ]  Needs Assistance [  ]  N/A [  ]  Transfers: Independent [  ]  Supervision [  ]  Needs Assistance [  ]  N/A [  ]   Ambulation: Independent [  ]  Supervision [  ]  Needs Assistance [  ]  N/A [  ]  ADL: Independent [  ] Requires Assistance [  ] N/A [  ]      LABS:                        13.8   13.36 )-----------( 312      ( 16 Aug 2023 08:00 )             43.2     08-16    140  |  103  |  19  ----------------------------<  70  4.4   |  23  |  1.0    Ca    9.7      16 Aug 2023 08:00  Mg     2.2     08-16    TPro  7.1  /  Alb  4.1  /  TBili  0.4  /  DBili  x   /  AST  24  /  ALT  9   /  AlkPhos  64  08-16      Urinalysis Basic - ( 16 Aug 2023 08:00 )    Color: x / Appearance: x / SG: x / pH: x  Gluc: 70 mg/dL / Ketone: x  / Bili: x / Urobili: x   Blood: x / Protein: x / Nitrite: x   Leuk Esterase: x / RBC: x / WBC x   Sq Epi: x / Non Sq Epi: x / Bacteria: x        RADIOLOGY & ADDITIONAL STUDIES:    Assesment:

## 2023-08-16 NOTE — PROGRESS NOTE ADULT - ASSESSMENT
86yo F PMHx HTN, breast cancer and lymphoma s/p surgery in 2004, glaucoma, presenting for worsening dizziness and unsteadiness over the past week.       # Dizziness and Unsteadiness   # Bilateral lower limb weakness   - f/u repeat orthostatics   - Labs unremarkable  - CT head showed: Mild progression of cerebral atrophy. Mild progression of periventricular and internal and external capsule hypodensity likely representing ischemic change, age indeterminant.  - MR Head No Cont No evidence of acute infarct. Moderate chronic microvascular ischemic changes.  - Neurology evaluated her, their impression is orthostatics as brain imaging is negative and the dizziness is worse with movement   - PT recommended Rehab facility   - f/u TSH, folate, B12   - started on meclizine 25 mg X3   - f/u check duplex       # Positive UA  - dysuria  - continue Cipro 500 mg BID   - WBC 12K  - follow up urine cultures     # HTN   - On home nifedipine and metoprolol ER  - continue home medications     # GERD   - continue famotidine       # DVT prophylaxis: Lovenox  # GI prophylaxis: Famotidine  # Diet: DASH/TLC  # Activity: as tolerated  # Code status: Full Code  # Disposition: rehab    Pending: f/u for dispo 88yo F PMHx HTN, breast cancer and lymphoma s/p surgery in 2004, glaucoma, presenting for worsening dizziness and unsteadiness over the past week.       # Dizziness and Unsteadiness   # Bilateral lower limb weakness   - f/u repeat orthostatics   - Labs unremarkable  - CT head showed: Mild progression of cerebral atrophy. Mild progression of periventricular and internal and external capsule hypodensity likely representing ischemic change, age indeterminant.  - MR Head No Cont No evidence of acute infarct. Moderate chronic microvascular ischemic changes.  - Neurology evaluated her, their impression is orthostatics as brain imaging is negative and the dizziness is worse with movement   - PT recommended Rehab facility   - f/u TSH, folate, B12   - started on meclizine 25 mg X3   - f/u check duplex       # Positive UA  - dysuria  - continue Cipro 500 mg BID   - WBC 12K  - follow up urine cultures     # HTN   - On home nifedipine and metoprolol ER  - continue home medications     # GERD   - continue famotidine       # DVT prophylaxis: Lovenox  # GI prophylaxis: Famotidine  # Diet: DASH/TLC  # Activity: as tolerated  # Code status: Full Code  # Disposition: short term rehab in SNF    Pending: f/u placement of short term rehab in SNF

## 2023-08-16 NOTE — PROGRESS NOTE ADULT - SUBJECTIVE AND OBJECTIVE BOX
LEIGHA GAN  87y  Female      Patient is a 87y old  Female who presents with a chief complaint of Dizziness       INTERVAL HPI/OVERNIGHT EVENTS:      ******************************* REVIEW OF SYSTEMS:**********************************************    All other review of systems negative    *********************** VITALS ******************************************    T(F): 99.7 (08-16-23 @ 13:33)  HR: 77 (08-16-23 @ 13:33) (70 - 86)  BP: 160/70 (08-16-23 @ 13:33) (140/79 - 160/70)  RR: 15 (08-16-23 @ 13:33) (15 - 19)  SpO2: 99% (08-16-23 @ 13:33) (95% - 99%)            ******************************** PHYSICAL EXAM:**************************************************  GENERAL: NAD    PSYCH: no agitation, baseline mentation  HEENT:     NERVOUS SYSTEM:  Alert & Oriented X3,   PULMONARY: CHELITA, CTA    CARDIOVASCULAR: S1S2 RRR    GI: Soft, NT, ND; BS present.    EXTREMITIES:  2+ Peripheral Pulses, No clubbing, cyanosis, or edema    LYMPH: No lymphadenopathy noted    SKIN: No rashes or lesions      **************************** LABS *******************************************************                          13.8   13.36 )-----------( 312      ( 16 Aug 2023 08:00 )             43.2     08-16    140  |  103  |  19  ----------------------------<  70  4.4   |  23  |  1.0    Ca    9.7      16 Aug 2023 08:00  Mg     2.2     08-16    TPro  7.1  /  Alb  4.1  /  TBili  0.4  /  DBili  x   /  AST  24  /  ALT  9   /  AlkPhos  64  08-16      Urinalysis Basic - ( 16 Aug 2023 08:00 )    Color: x / Appearance: x / SG: x / pH: x  Gluc: 70 mg/dL / Ketone: x  / Bili: x / Urobili: x   Blood: x / Protein: x / Nitrite: x   Leuk Esterase: x / RBC: x / WBC x   Sq Epi: x / Non Sq Epi: x / Bacteria: x        Lactate Trend        CAPILLARY BLOOD GLUCOSE              **************************Active Medications *******************************************  Bactrim (Hives)  Percocet 5/325 (Other)  penicillin (Short breath; Hives)      ciprofloxacin     Tablet 500 milliGRAM(s) Oral every 12 hours  enoxaparin Injectable 40 milliGRAM(s) SubCutaneous every 24 hours  famotidine    Tablet 20 milliGRAM(s) Oral daily  meclizine 25 milliGRAM(s) Oral three times a day  metoprolol succinate ER 50 milliGRAM(s) Oral daily  NIFEdipine XL 30 milliGRAM(s) Oral daily      ***************************************************  RADIOLOGY & ADDITIONAL TESTS:    Imaging Personally Reviewed:  [ ] YES  [ ] NO    HEALTH ISSUES - PROBLEM Dx:

## 2023-08-16 NOTE — PATIENT PROFILE ADULT - FUNCTIONAL ASSESSMENT - BASIC MOBILITY 6.
2-calculated by average/Not able to assess (calculate score using [facility] averaging method)

## 2023-08-16 NOTE — CONSULT NOTE ADULT - ASSESSMENT
IMPRESSION: Rehab of gait abnormality, subjective vertigo, proximal lower extremity weakness. She won't tolerate the intensity of acute rehab.  STR at a SNF will be required. Neurology follow up after discharge is suggested. She is not certain of neck rotation worsens her subjective vertigo.     PRECAUTIONS: [x  ] Cardiac  [  ] Respiratory  [  ] Seizures [  ] Contact Isolation  [  ] Droplet Isolation  [  ] Other    Weight Bearing Status:     RECOMMENDATION:    Out of Bed to Chair     DVT/Decubiti Prophylaxis    REHAB PLAN:     [x   ] Bedside P/T 3-5 times a week   [   ]   Bedside O/T  2-3 times a week             [   ] No Rehab Therapy Indicated                   [   ]  Speech Therapy   Conditioning/ROM                                    ADL  Bed Mobility                                               Conditioning/ROM  Transfers                                                     Bed Mobility  Sitting /Standing Balance                         Transfers                                        Gait Training                                               Sitting/Standing Balance  Stair Training [   ]Applicable                    Home equipment Eval                                                                        Splinting  [   ] Only      GOALS:   ADL   [ x  ]   Independent                    Transfers  [  x ] Independent                          Ambulation  [ x  ] Independent     [ x   ] With device                            [   ]  CG                                                         [   ]  CG                                                                  [   ] CG                            [    ] Min A                                                   [   ] Min A                                                              [   ] Min  A          DISCHARGE PLAN:   [   ]  Good candidate for Intensive Rehabilitation/Hospital based-[facility]                                             Will tolerate 3hrs Intensive Rehab Daily                                       [ xx   ]  Short Term Rehab in Skilled Nursing Facility                                       [    ]  Home with Outpatient or VN services                                         [    ]  Possible Candidate for Intensive Hospital based Rehab

## 2023-08-16 NOTE — PROGRESS NOTE ADULT - SUBJECTIVE AND OBJECTIVE BOX
24H events:    Patient is a 87y old Female who presents with a chief complaint of Dizziness (15 Aug 2023 15:49)    Primary diagnosis of Dizziness    Vital showed high /90.   CT head and MR brain negative for acute pathology. PT recommended Rehab facility. Recheck orthostatics.      Today is hospital day 1d. This morning patient was seen and examined at bedside, resting comfortably in bed.    No acute or major events overnight.    Code Status: FULL    Family communication:  Contact date: 8/16  Relationship to patient: Niece    PAST MEDICAL & SURGICAL HISTORY  Prolapsed uterus    Hernia    HTN (hypertension)    GERD (gastroesophageal reflux disease)    Breast CA    No significant past surgical history      SOCIAL HISTORY:  Social History:  Lives alone in [place] (15 Aug 2023 15:49)      ALLERGIES:  Bactrim (Hives)  penicillin (Short breath; Hives)    MEDICATIONS:  STANDING MEDICATIONS  ciprofloxacin     Tablet 500 milliGRAM(s) Oral every 12 hours  enoxaparin Injectable 40 milliGRAM(s) SubCutaneous every 24 hours  famotidine    Tablet 20 milliGRAM(s) Oral daily  meclizine 25 milliGRAM(s) Oral three times a day  metoprolol succinate ER 50 milliGRAM(s) Oral daily  NIFEdipine XL 30 milliGRAM(s) Oral daily    PRN MEDICATIONS    VITALS:   T(F): 97.7  HR: 70  BP: 147/78  RR: 19  SpO2: 98%    PHYSICAL EXAM:  GENERAL:   ( x ) NAD, lying in bed comfortably     (  ) obtunded     (  ) lethargic     (  ) somnolent    HEAD:   ( x ) Atraumatic     (  ) hematoma     (  ) laceration (specify location:       )     NECK:  ( x ) Supple     (  ) neck stiffness     (  ) nuchal rigidity     (  )  no JVD     (  ) JVD present ( -- cm)    HEART:  Rate -->     ( x ) normal rate     (  ) bradycardic     (  ) tachycardic  Rhythm -->     ( x ) regular     (  ) regularly irregular     (  ) irregularly irregular  Murmurs -->     ( x ) normal s1s2     (  ) systolic murmur     (  ) diastolic murmur     (  ) continuous murmur      (  ) S3 present     (  ) S4 present    LUNGS:   ( x )Unlabored respirations     (  ) tachypnea  ( x ) B/L air entry     (  ) decreased breath sounds in:  (location     )    (  ) no adventitious sound     (  ) crackles     (  ) wheezing      (  ) rhonchi      (specify location:       )  (  ) chest wall tenderness (specify location:       )    ABDOMEN:   ( x ) Soft     (  ) tense   |   ( x ) nondistended     (  ) distended   |   (  ) +BS     (  ) hypoactive bowel sounds     (  ) hyperactive bowel sounds  ( x ) nontender     (  ) RUQ tenderness     (  ) RLQ tenderness     (  ) LLQ tenderness     (  ) epigastric tenderness     (  ) diffuse tenderness  (  ) Splenomegaly      (  ) Hepatomegaly      (  ) Jaundice     (  ) ecchymosis     EXTREMITIES:  ( x ) Normal     (  ) Rash     (  ) ecchymosis     (  ) varicose veins      (  ) pitting edema     (  ) non-pitting edema   (  ) ulceration     (  ) gangrene:     (location:     )    NERVOUS SYSTEM:    ( x ) A&Ox3     (  ) confused     (  ) lethargic      AMPA score:      LABS:                        13.8   13.36 )-----------( 312      ( 16 Aug 2023 08:00 )             43.2     08-16    140  |  103  |  19  ----------------------------<  70  4.4   |  23  |  1.0    Ca    9.7      16 Aug 2023 08:00  Mg     2.2     08-16    TPro  7.1  /  Alb  4.1  /  TBili  0.4  /  DBili  x   /  AST  24  /  ALT  9   /  AlkPhos  64  08-16      Urinalysis Basic - ( 16 Aug 2023 08:00 )    Color: x / Appearance: x / SG: x / pH: x  Gluc: 70 mg/dL / Ketone: x  / Bili: x / Urobili: x   Blood: x / Protein: x / Nitrite: x   Leuk Esterase: x / RBC: x / WBC x   Sq Epi: x / Non Sq Epi: x / Bacteria: x            Culture - Urine (collected 14 Aug 2023 11:37)  Source: Clean Catch Clean Catch (Midstream)  Final Report (15 Aug 2023 19:54):    <10,000 CFU/mL Normal Urogenital Wilma          RADIOLOGY:  CXR  Xray Chest 1 View- PORTABLE-Urgent:   ACC: [id number] EXAM:  XR CHEST PORTABLE URGENT 1V   ORDERED BY: DEAN MÉNDEZ     PROCEDURE DATE:  08/14/2023          INTERPRETATION:  CLINICAL HISTORY: Chest Pain.    COMPARISON: 1/15/2021.    TECHNIQUE: Portable frontal chest radiograph. Adequate positioning.    FINDINGS:    Support devices: None.    Cardiac/mediastinum/hilum: Stable.    Lung parenchyma/Pleura: No focal parenchymal opacities, pleural   effusions, or pneumothorax.    Skeleton/soft tissues: Stable.      IMPRESSION:    No radiographic evidence of acute cardiopulmonary disease.    --- End of Report ---            CHERRI MARIANO MD; Attending Radiologist  This document has been electronically signed. Aug 14 2023  1:36PM (08-14-23 @ 12:36)      CT

## 2023-08-16 NOTE — PROGRESS NOTE ADULT - ASSESSMENT
86yo F PMHx HTN, breast cancer and lymphoma s/p surgery in 2004, glaucoma, presenting for worsening dizziness and unsteadiness over the past week.      # Bilateral lower limb weakness/ with dizziness    Acute CVA ruled out.    - repeat orthostatics   - Labs unremarkable  - CT head showed: Mild progression of cerebral atrophy. Mild progression of periventricular and internal and external capsule hypodensity likely representing ischemic change, age indeterminant.  - MR Head No Cont No evidence of acute infarct. Moderate chronic microvascular ischemic changes.  - Neurology evaluated her, their impression is orthostatics as brain imaging is negative and the dizziness is worse with movement   - PT recommended Rehab facility   - f/u TSH, folate, B12   - started on meclizine 25 mg X3     # Possible Cystitis   - continue Cipro 500 mg BID  ( allergic to PCN )  - WBC 12K  -  urine cultures  >> Normal urogenital kelvin     # HTN   - On home nifedipine and metoprolol ER  - continue home medications     # GERD   - continue famotidine     # DVT prophylaxis: Lovenox  # GI prophylaxis: Famotidine  # Diet: DASH/TLC  # Activity: as tolerated  # Code status: Full Code  # Disposition: rehab    Pending: Orthostatics/ Clinical improvement/ PT   Dispo: TBD

## 2023-08-16 NOTE — PATIENT PROFILE ADULT - FALL HARM RISK - HARM RISK INTERVENTIONS
Assistance with ambulation/Assistance OOB with selected safe patient handling equipment/Communicate Risk of Fall with Harm to all staff/Discuss with provider need for PT consult/Monitor gait and stability/Provide patient with walking aids - walker, cane, crutches/Reinforce activity limits and safety measures with patient and family/Sit up slowly, dangle for a short time, stand at bedside before walking/Tailored Fall Risk Interventions/Visual Cue: Yellow wristband and red socks/Bed in lowest position, wheels locked, appropriate side rails in place/Call bell, personal items and telephone in reach/Instruct patient to call for assistance before getting out of bed or chair/Non-slip footwear when patient is out of bed/[place] to call system/Physically safe environment - no spills, clutter or unnecessary equipment/Purposeful Proactive Rounding/Room/bathroom lighting operational, light cord in reach

## 2023-08-17 LAB
ANION GAP SERPL CALC-SCNC: 17 MMOL/L — HIGH (ref 7–14)
BUN SERPL-MCNC: 23 MG/DL — HIGH (ref 10–20)
CALCIUM SERPL-MCNC: 9.6 MG/DL — SIGNIFICANT CHANGE UP (ref 8.4–10.5)
CHLORIDE SERPL-SCNC: 102 MMOL/L — SIGNIFICANT CHANGE UP (ref 98–110)
CO2 SERPL-SCNC: 19 MMOL/L — SIGNIFICANT CHANGE UP (ref 17–32)
CREAT SERPL-MCNC: 1.2 MG/DL — SIGNIFICANT CHANGE UP (ref 0.7–1.5)
EGFR: 44 ML/MIN/1.73M2 — LOW
GLUCOSE SERPL-MCNC: 89 MG/DL — SIGNIFICANT CHANGE UP (ref 70–99)
HCT VFR BLD CALC: 42.5 % — SIGNIFICANT CHANGE UP (ref 37–47)
HGB BLD-MCNC: 13.3 G/DL — SIGNIFICANT CHANGE UP (ref 12–16)
MAGNESIUM SERPL-MCNC: 2.1 MG/DL — SIGNIFICANT CHANGE UP (ref 1.8–2.4)
MCHC RBC-ENTMCNC: 28 PG — SIGNIFICANT CHANGE UP (ref 27–31)
MCHC RBC-ENTMCNC: 31.3 G/DL — LOW (ref 32–37)
MCV RBC AUTO: 89.5 FL — SIGNIFICANT CHANGE UP (ref 81–99)
NRBC # BLD: 0 /100 WBCS — SIGNIFICANT CHANGE UP (ref 0–0)
PLATELET # BLD AUTO: 245 K/UL — SIGNIFICANT CHANGE UP (ref 130–400)
PMV BLD: 11.5 FL — HIGH (ref 7.4–10.4)
POTASSIUM SERPL-MCNC: 4.4 MMOL/L — SIGNIFICANT CHANGE UP (ref 3.5–5)
POTASSIUM SERPL-SCNC: 4.4 MMOL/L — SIGNIFICANT CHANGE UP (ref 3.5–5)
RBC # BLD: 4.75 M/UL — SIGNIFICANT CHANGE UP (ref 4.2–5.4)
RBC # FLD: 13.9 % — SIGNIFICANT CHANGE UP (ref 11.5–14.5)
SODIUM SERPL-SCNC: 138 MMOL/L — SIGNIFICANT CHANGE UP (ref 135–146)
WBC # BLD: 14.31 K/UL — HIGH (ref 4.8–10.8)
WBC # FLD AUTO: 14.31 K/UL — HIGH (ref 4.8–10.8)

## 2023-08-17 PROCEDURE — 99232 SBSQ HOSP IP/OBS MODERATE 35: CPT

## 2023-08-17 PROCEDURE — 93970 EXTREMITY STUDY: CPT | Mod: 26

## 2023-08-17 RX ORDER — METOPROLOL TARTRATE 50 MG
25 TABLET ORAL DAILY
Refills: 0 | Status: DISCONTINUED | OUTPATIENT
Start: 2023-08-18 | End: 2023-08-29

## 2023-08-17 RX ADMIN — Medication 50 MILLIGRAM(S): at 05:02

## 2023-08-17 RX ADMIN — Medication 500 MILLIGRAM(S): at 05:03

## 2023-08-17 RX ADMIN — Medication 25 MILLIGRAM(S): at 05:02

## 2023-08-17 RX ADMIN — Medication 500 MILLIGRAM(S): at 17:24

## 2023-08-17 RX ADMIN — Medication 25 MILLIGRAM(S): at 21:59

## 2023-08-17 RX ADMIN — FAMOTIDINE 20 MILLIGRAM(S): 10 INJECTION INTRAVENOUS at 11:05

## 2023-08-17 RX ADMIN — Medication 30 MILLIGRAM(S): at 05:02

## 2023-08-17 RX ADMIN — Medication 25 MILLIGRAM(S): at 13:48

## 2023-08-17 NOTE — PROGRESS NOTE ADULT - SUBJECTIVE AND OBJECTIVE BOX
LEIGHA GAN  87y  Female      Patient is a 87y old  Female who presents with a chief complaint of Dizziness       INTERVAL HPI/OVERNIGHT EVENTS:      ******************************* REVIEW OF SYSTEMS:**********************************************    All other review of systems negative    *********************** VITALS ******************************************    T(F): 96.9 (08-17-23 @ 04:41)  HR: 73 (08-17-23 @ 04:41) (73 - 81)  BP: 164/72 (08-17-23 @ 04:41) (128/63 - 164/72)  RR: 19 (08-17-23 @ 04:41) (18 - 19)  SpO2: 97% (08-17-23 @ 04:41) (97% - 97%)            ******************************** PHYSICAL EXAM:**************************************************  GENERAL: NAD    PSYCH: no agitation, baseline mentation  HEENT:     NERVOUS SYSTEM:  Alert & Oriented X3,     PULMONARY: CHELITA, CTA    CARDIOVASCULAR: S1S2 RRR    GI: Soft, NT, ND; BS present.    EXTREMITIES:  2+ Peripheral Pulses, No clubbing, cyanosis, or edema    LYMPH: No lymphadenopathy noted    SKIN: No rashes or lesions      **************************** LABS *******************************************************                          13.3   14.31 )-----------( 245      ( 17 Aug 2023 05:38 )             42.5     08-17    138  |  102  |  23<H>  ----------------------------<  89  4.4   |  19  |  1.2    Ca    9.6      17 Aug 2023 05:38  Mg     2.1     08-17    TPro  7.1  /  Alb  4.1  /  TBili  0.4  /  DBili  x   /  AST  24  /  ALT  9   /  AlkPhos  64  08-16      Urinalysis Basic - ( 17 Aug 2023 05:38 )    Color: x / Appearance: x / SG: x / pH: x  Gluc: 89 mg/dL / Ketone: x  / Bili: x / Urobili: x   Blood: x / Protein: x / Nitrite: x   Leuk Esterase: x / RBC: x / WBC x   Sq Epi: x / Non Sq Epi: x / Bacteria: x        Lactate Trend        CAPILLARY BLOOD GLUCOSE              **************************Active Medications *******************************************  Bactrim (Hives)  Percocet 5/325 (Other)  penicillin (Short breath; Hives)      ciprofloxacin     Tablet 500 milliGRAM(s) Oral every 12 hours  enoxaparin Injectable 40 milliGRAM(s) SubCutaneous every 24 hours  famotidine    Tablet 20 milliGRAM(s) Oral daily  meclizine 25 milliGRAM(s) Oral three times a day  NIFEdipine XL 30 milliGRAM(s) Oral daily      ***************************************************  RADIOLOGY & ADDITIONAL TESTS:    Imaging Personally Reviewed:  [ ] YES  [ ] NO    HEALTH ISSUES - PROBLEM Dx:

## 2023-08-17 NOTE — PROGRESS NOTE ADULT - SUBJECTIVE AND OBJECTIVE BOX
24H events:    Patient is a 87y old Female who presents with a chief complaint of Dizziness (15 Aug 2023 15:49)    Primary diagnosis of Dizziness    Vital showed high /90.   CT head and MR brain negative for acute pathology. PT recommended Rehab facility. Recheck orthostatics.      Today is hospital day 2d. This morning patient was seen and examined at bedside, resting comfortably in bed.    No acute or major events overnight.    Code Status: FULL    Family communication:  Contact date: 8/16  Relationship to patient: Niece    PAST MEDICAL & SURGICAL HISTORY  Prolapsed uterus    Hernia    HTN (hypertension)    GERD (gastroesophageal reflux disease)    Breast CA    No significant past surgical history      SOCIAL HISTORY:  Social History:  Lives alone in [place] (15 Aug 2023 15:49)      ALLERGIES:  Bactrim (Hives)  penicillin (Short breath; Hives)    MEDICATIONS:  STANDING MEDICATIONS  ciprofloxacin     Tablet 500 milliGRAM(s) Oral every 12 hours  enoxaparin Injectable 40 milliGRAM(s) SubCutaneous every 24 hours  famotidine    Tablet 20 milliGRAM(s) Oral daily  meclizine 25 milliGRAM(s) Oral three times a day  NIFEdipine XL 30 milliGRAM(s) Oral daily    PRN MEDICATIONS    VITALS:   T(F): 96.9  HR: 73  BP: 164/72  RR: 19  SpO2: 97%      PHYSICAL EXAM:  GENERAL:   ( x ) NAD, lying in bed comfortably     (  ) obtunded     (  ) lethargic     (  ) somnolent    HEAD:   ( x ) Atraumatic     (  ) hematoma     (  ) laceration (specify location:       )     NECK:  ( x ) Supple     (  ) neck stiffness     (  ) nuchal rigidity     (  )  no JVD     (  ) JVD present ( -- cm)    HEART:  Rate -->     ( x ) normal rate     (  ) bradycardic     (  ) tachycardic  Rhythm -->     ( x ) regular     (  ) regularly irregular     (  ) irregularly irregular  Murmurs -->     ( x ) normal s1s2     (  ) systolic murmur     (  ) diastolic murmur     (  ) continuous murmur      (  ) S3 present     (  ) S4 present    LUNGS:   ( x )Unlabored respirations     (  ) tachypnea  ( x ) B/L air entry     (  ) decreased breath sounds in:  (location     )    (  ) no adventitious sound     (  ) crackles     (  ) wheezing      (  ) rhonchi      (specify location:       )  (  ) chest wall tenderness (specify location:       )    ABDOMEN:   ( x ) Soft     (  ) tense   |   ( x ) nondistended     (  ) distended   |   (  ) +BS     (  ) hypoactive bowel sounds     (  ) hyperactive bowel sounds  ( x ) nontender     (  ) RUQ tenderness     (  ) RLQ tenderness     (  ) LLQ tenderness     (  ) epigastric tenderness     (  ) diffuse tenderness  (  ) Splenomegaly      (  ) Hepatomegaly      (  ) Jaundice     (  ) ecchymosis     EXTREMITIES:  ( x ) Normal     (  ) Rash     (  ) ecchymosis     (  ) varicose veins      (  ) pitting edema     (  ) non-pitting edema   (  ) ulceration     (  ) gangrene:     (location:     )    NERVOUS SYSTEM:    ( x ) A&Ox3     (  ) confused     (  ) lethargic      [facility] score:      LABS:                        13.3   14.31 )-----------( 245      ( 17 Aug 2023 05:38 )             42.5     08-17    138  |  102  |  23<H>  ----------------------------<  89  4.4   |  19  |  1.2    Ca    9.6      17 Aug 2023 05:38  Mg     2.1     08-17    TPro  7.1  /  Alb  4.1  /  TBili  0.4  /  DBili  x   /  AST  24  /  ALT  9   /  AlkPhos  64  08-16      Urinalysis Basic - ( 17 Aug 2023 05:38 )    Color: x / Appearance: x / SG: x / pH: x  Gluc: 89 mg/dL / Ketone: x  / Bili: x / Urobili: x   Blood: x / Protein: x / Nitrite: x   Leuk Esterase: x / RBC: x / WBC x   Sq Epi: x / Non Sq Epi: x / Bacteria: x            Culture - Urine (collected 14 Aug 2023 11:37)  Source: Clean Catch Clean Catch (Midstream)  Final Report (15 Aug 2023 19:54):    <10,000 CFU/mL Normal Urogenital Wilma      RADIOLOGY:  CXR  Xray Chest 1 View- PORTABLE-Urgent:   ACC: [id number] EXAM:  XR CHEST PORTABLE URGENT 1V   ORDERED BY: DEAN MÉNDEZ     PROCEDURE DATE:  08/14/2023          INTERPRETATION:  CLINICAL HISTORY: Chest Pain.    COMPARISON: 1/15/2021.    TECHNIQUE: Portable frontal chest radiograph. Adequate positioning.    FINDINGS:    Support devices: None.    Cardiac/mediastinum/hilum: Stable.    Lung parenchyma/Pleura: No focal parenchymal opacities, pleural   effusions, or pneumothorax.    Skeleton/soft tissues: Stable.      IMPRESSION:    No radiographic evidence of acute cardiopulmonary disease.    --- End of Report ---            CHERRI MARIANO MD; Attending Radiologist  This document has been electronically signed. Aug 14 2023  1:36PM (08-14-23 @ 12:36)      CT

## 2023-08-17 NOTE — PROGRESS NOTE ADULT - ASSESSMENT
86yo F PMHx HTN, breast cancer and lymphoma s/p surgery in 2004, glaucoma, presenting for worsening dizziness and unsteadiness over the past week.      # Bilateral lower limb weakness/ with dizziness    likely due to Orthostatic Hypotension and BPPV     Acute CVA ruled out.    -  orthostatics vitals positive   - CT head showed: Mild progression of cerebral atrophy. Mild progression of periventricular and internal and external capsule hypodensity likely representing ischemic change, age indeterminant.  - MR Head No Cont No evidence of acute infarct. Moderate chronic microvascular ischemic changes.  - Neurology evaluated her, their impression is orthostatics as brain imaging is negative and the dizziness is worse with movement   - PT recommended Rehab facility   -  TSH, folate, B12  >>WNL   - started on meclizine 25 mg X3   - BP meds adjusted, Teds stockings     # Possible Cystitis   - continue Cipro 500 mg BID  ( allergic to PCN )  - WBC 12K  -  urine cultures  >> Normal urogenital kelvin     # HTN   - On home nifedipine and metoprolol ER  - continue home medications     # GERD   - continue famotidine     # DVT prophylaxis: Lovenox  # GI prophylaxis: Famotidine  # Diet: DASH/TLC  # Activity: as tolerated  # Code status: Full Code  # Disposition: rehab    Pending:  Clinical improvement/ Orthostatic Hypotension  Dispo: STR

## 2023-08-18 PROCEDURE — 99232 SBSQ HOSP IP/OBS MODERATE 35: CPT

## 2023-08-18 PROCEDURE — 93880 EXTRACRANIAL BILAT STUDY: CPT | Mod: 26

## 2023-08-18 RX ORDER — APIXABAN 2.5 MG/1
10 TABLET, FILM COATED ORAL EVERY 12 HOURS
Refills: 0 | Status: COMPLETED | OUTPATIENT
Start: 2023-08-18 | End: 2023-08-25

## 2023-08-18 RX ORDER — ACETAMINOPHEN 500 MG
650 TABLET ORAL ONCE
Refills: 0 | Status: COMPLETED | OUTPATIENT
Start: 2023-08-18 | End: 2023-08-18

## 2023-08-18 RX ORDER — APIXABAN 2.5 MG/1
5 TABLET, FILM COATED ORAL EVERY 12 HOURS
Refills: 0 | Status: DISCONTINUED | OUTPATIENT
Start: 2023-08-25 | End: 2023-08-29

## 2023-08-18 RX ADMIN — Medication 500 MILLIGRAM(S): at 17:03

## 2023-08-18 RX ADMIN — APIXABAN 10 MILLIGRAM(S): 2.5 TABLET, FILM COATED ORAL at 17:03

## 2023-08-18 RX ADMIN — Medication 500 MILLIGRAM(S): at 05:10

## 2023-08-18 RX ADMIN — Medication 25 MILLIGRAM(S): at 20:22

## 2023-08-18 RX ADMIN — Medication 650 MILLIGRAM(S): at 11:18

## 2023-08-18 RX ADMIN — Medication 25 MILLIGRAM(S): at 17:03

## 2023-08-18 RX ADMIN — Medication 25 MILLIGRAM(S): at 05:10

## 2023-08-18 RX ADMIN — Medication 30 MILLIGRAM(S): at 05:11

## 2023-08-18 RX ADMIN — FAMOTIDINE 20 MILLIGRAM(S): 10 INJECTION INTRAVENOUS at 11:18

## 2023-08-18 RX ADMIN — Medication 650 MILLIGRAM(S): at 12:00

## 2023-08-18 RX ADMIN — Medication 650 MILLIGRAM(S): at 20:22

## 2023-08-18 NOTE — PROGRESS NOTE ADULT - SUBJECTIVE AND OBJECTIVE BOX
LEIGHA GAN  87y  Female      Patient is a 87y old  Female who presents with a chief complaint of Dizziness (18 Aug 2023 11:45)      INTERVAL HPI/OVERNIGHT EVENTS:      ******************************* REVIEW OF SYSTEMS:**********************************************    CONSTITUTIONAL: No fever, weight loss, or fatigue  RESPIRATORY: No cough, wheezing, chills or hemoptysis; No shortness of breath  CARDIOVASCULAR: No chest pain, palpitations, dizziness, or leg swelling  GASTROINTESTINAL: No abdominal or epigastric pain. No nausea, vomiting, or hematemesis; No diarrhea or constipation. No melena or hematochezia.  GENITOURINARY: No dysuria, frequency, hematuria, or incontinence  NEUROLOGICAL: No headaches, memory loss, loss of strength, numbness, or tremors  SKIN: No itching, burning, rashes, or lesions   MUSCULOSKELETAL: No joint pain or swelling; No muscle, back, or extremity pain  PSYCHIATRIC: No depression, anxiety, mood swings, or difficulty sleeping    All other review of systems negative    *********************** VITALS ******************************************    T(F): 100.7 (08-18-23 @ 10:29)  HR: 83 (08-18-23 @ 05:00) (83 - 85)  BP: 145/67 (08-18-23 @ 05:00) (125/59 - 145/67)  RR: 18 (08-18-23 @ 05:00) (18 - 18)  SpO2: 98% (08-18-23 @ 05:00) (97% - 98%)            ******************************** PHYSICAL EXAM:**************************************************  GENERAL: NAD    PSYCH: no agitation, baseline mentation  HEENT:     NERVOUS SYSTEM:  Alert & Oriented X3, MS  5/5 B/L  UE and LE ; Sensory intact    PULMONARY: CHELITA, CTA    CARDIOVASCULAR: S1S2 RRR    GI: Soft, NT, ND; BS present.    EXTREMITIES:  2+ Peripheral Pulses, No clubbing, cyanosis, or edema    LYMPH: No lymphadenopathy noted    SKIN: No rashes or lesions      **************************** LABS *******************************************************                          13.3   14.31 )-----------( 245      ( 17 Aug 2023 05:38 )             42.5     08-17    138  |  102  |  23<H>  ----------------------------<  89  4.4   |  19  |  1.2    Ca    9.6      17 Aug 2023 05:38  Mg     2.1     08-17        Urinalysis Basic - ( 17 Aug 2023 05:38 )    Color: x / Appearance: x / SG: x / pH: x  Gluc: 89 mg/dL / Ketone: x  / Bili: x / Urobili: x   Blood: x / Protein: x / Nitrite: x   Leuk Esterase: x / RBC: x / WBC x   Sq Epi: x / Non Sq Epi: x / Bacteria: x        Lactate Trend        CAPILLARY BLOOD GLUCOSE              **************************Active Medications *******************************************  Bactrim (Hives)  Percocet 5/325 (Other)  penicillin (Short breath; Hives)      apixaban 10 milliGRAM(s) Oral every 12 hours  ciprofloxacin     Tablet 500 milliGRAM(s) Oral every 12 hours  famotidine    Tablet 20 milliGRAM(s) Oral daily  meclizine 25 milliGRAM(s) Oral three times a day  metoprolol succinate ER 25 milliGRAM(s) Oral daily  NIFEdipine XL 30 milliGRAM(s) Oral daily      ***************************************************  RADIOLOGY & ADDITIONAL TESTS:    Imaging Personally Reviewed:  [ ] YES  [ ] NO    HEALTH ISSUES - PROBLEM Dx:

## 2023-08-18 NOTE — PROGRESS NOTE ADULT - ASSESSMENT
86yo F PMHx HTN, breast cancer and lymphoma s/p surgery in 2004, glaucoma, presenting for worsening dizziness and unsteadiness over the past week.      # Bilateral lower limb weakness/ with dizziness    likely due to Orthostatic Hypotension and BPPV     Acute CVA ruled out.    -  orthostatics vitals positive   - CT head showed: Mild progression of cerebral atrophy. Mild progression of periventricular and internal and external capsule hypodensity likely representing ischemic change, age indeterminant.  - MR Head No Cont No evidence of acute infarct. Moderate chronic microvascular ischemic changes.  - Neurology evaluated her, their impression is orthostatics as brain imaging is negative and the dizziness is worse with movement   - PT recommended Rehab facility   -  TSH, folate, B12  >>WNL   - started on meclizine 25 mg X3   - BP meds adjusted, Teds stockings     # Possible Cystitis   - continue Cipro 500 mg BID  ( allergic to PCN )  - WBC 12K  -  urine cultures  >> Normal urogenital kelvin     # Left peroneal DVT >> would start A/C given hx of Lymphoma and Breast ca,     # HTN   - On home nifedipine and metoprolol ER  - continue home medications     # GERD   - continue famotidine     # DVT prophylaxis: Lovenox  # GI prophylaxis: Famotidine  # Diet: DASH/TLC  # Activity: as tolerated  # Code status: Full Code  # Disposition: rehab    Pending:  placement to [facility]   Dispo: STR

## 2023-08-18 NOTE — PROGRESS NOTE ADULT - SUBJECTIVE AND OBJECTIVE BOX
24H events:    Patient is a 87y old Female who presents with a chief complaint of Dizziness (15 Aug 2023 15:49)    Primary diagnosis of Dizziness    Vital showed high /90.   CT head and MR brain negative for acute pathology. PT recommended Rehab facility. Recheck orthostatics.      Today is hospital day 3d. This morning patient was seen and examined at bedside, resting comfortably in bed.    No acute or major events overnight.    Code Status: FULL    Family communication:  Contact date: 8/16  Relationship to patient: Niece    PAST MEDICAL & SURGICAL HISTORY  Prolapsed uterus    Hernia    HTN (hypertension)    GERD (gastroesophageal reflux disease)    Breast CA    No significant past surgical history      SOCIAL HISTORY:  Social History:  Lives alone in [place] (15 Aug 2023 15:49)      ALLERGIES:  Bactrim (Hives)  penicillin (Short breath; Hives)    MEDICATIONS:  STANDING MEDICATIONS  apixaban 10 milliGRAM(s) Oral every 12 hours  ciprofloxacin     Tablet 500 milliGRAM(s) Oral every 12 hours  famotidine    Tablet 20 milliGRAM(s) Oral daily  meclizine 25 milliGRAM(s) Oral three times a day  metoprolol succinate ER 25 milliGRAM(s) Oral daily  NIFEdipine XL 30 milliGRAM(s) Oral daily    PRN MEDICATIONS    VITALS:   T(F): 100.7  HR: 83  BP: 145/67  RR: 18  SpO2: 98%      PHYSICAL EXAM:  GENERAL:   ( x ) NAD, lying in bed comfortably     (  ) obtunded     (  ) lethargic     (  ) somnolent    HEAD:   ( x ) Atraumatic     (  ) hematoma     (  ) laceration (specify location:       )     NECK:  ( x ) Supple     (  ) neck stiffness     (  ) nuchal rigidity     (  )  no JVD     (  ) JVD present ( -- cm)    HEART:  Rate -->     ( x ) normal rate     (  ) bradycardic     (  ) tachycardic  Rhythm -->     ( x ) regular     (  ) regularly irregular     (  ) irregularly irregular  Murmurs -->     ( x ) normal s1s2     (  ) systolic murmur     (  ) diastolic murmur     (  ) continuous murmur      (  ) S3 present     (  ) S4 present    LUNGS:   ( x )Unlabored respirations     (  ) tachypnea  ( x ) B/L air entry     (  ) decreased breath sounds in:  (location     )    (  ) no adventitious sound     (  ) crackles     (  ) wheezing      (  ) rhonchi      (specify location:       )  (  ) chest wall tenderness (specify location:       )    ABDOMEN:   ( x ) Soft     (  ) tense   |   ( x ) nondistended     (  ) distended   |   (  ) +BS     (  ) hypoactive bowel sounds     (  ) hyperactive bowel sounds  ( x ) nontender     (  ) RUQ tenderness     (  ) RLQ tenderness     (  ) LLQ tenderness     (  ) epigastric tenderness     (  ) diffuse tenderness  (  ) Splenomegaly      (  ) Hepatomegaly      (  ) Jaundice     (  ) ecchymosis     EXTREMITIES:  ( x ) Normal     (  ) Rash     (  ) ecchymosis     (  ) varicose veins      (  ) pitting edema     (  ) non-pitting edema   (  ) ulceration     (  ) gangrene:     (location:     )    NERVOUS SYSTEM:    ( x ) A&Ox3     (  ) confused     (  ) lethargic      AMPAC score:      LABS:                        13.3   14.31 )-----------( 245      ( 17 Aug 2023 05:38 )             42.5     08-17    138  |  102  |  23<H>  ----------------------------<  89  4.4   |  19  |  1.2    Ca    9.6      17 Aug 2023 05:38  Mg     2.1     08-17        Urinalysis Basic - ( 17 Aug 2023 05:38 )    Color: x / Appearance: x / SG: x / pH: x  Gluc: 89 mg/dL / Ketone: x  / Bili: x / Urobili: x   Blood: x / Protein: x / Nitrite: x   Leuk Esterase: x / RBC: x / WBC x   Sq Epi: x / Non Sq Epi: x / Bacteria: x

## 2023-08-18 NOTE — PROGRESS NOTE ADULT - ASSESSMENT
88yo F PMHx HTN, breast cancer and lymphoma s/p surgery in 2004, glaucoma, presenting for worsening dizziness and unsteadiness over the past week.       # Dizziness and Unsteadiness 2/2 Orthostatic Hypotension  # Bilateral lower limb weakness   - Positive orthostatics. Repeat orthostatics on 8/17 were negative. Repeat today (8/18)  - Labs unremarkable  - CT head showed: Mild progression of cerebral atrophy. Mild progression of periventricular and internal and external capsule hypodensity likely representing ischemic change, age indeterminant.  - MR Head No Cont No evidence of acute infarct. Moderate chronic microvascular ischemic changes.  - Neurology evaluated her, their impression is orthostatics as brain imaging is negative and the dizziness is worse with movement   - PT recommended Rehab facility   - TSH, folate, B12 normal  - started on meclizine 25 mg X3   - LE duplex prelim reading positive for DVT on left peroneal vein (8/18)    # Positive UA  - dysuria  - continue Cipro 500 mg BID   - WBC 12K  - Urine culture negative    # HTN   - Lower metoprolol to 25 mg once daily at nighttime  - c/w Nifedipine in the morning    # GERD   - continue famotidine       # DVT prophylaxis: Lovenox  # GI prophylaxis: Famotidine  # Diet: DASH/TLC  # Activity: as tolerated  # Code status: Full Code  # Disposition: short term rehab in SNF    Pending: f/u placement of short term rehab in SNF 86yo F PMHx HTN, breast cancer and lymphoma s/p surgery in 2004, glaucoma, presenting for worsening dizziness and unsteadiness over the past week.       # Dizziness and Unsteadiness 2/2 Orthostatic Hypotension  # Bilateral lower limb weakness   - Positive orthostatics. Repeat orthostatics on 8/17 were negative. Repeat today (8/18)  - Labs unremarkable  - CT head showed: Mild progression of cerebral atrophy. Mild progression of periventricular and internal and external capsule hypodensity likely representing ischemic change, age indeterminant.  - MR Head No Cont No evidence of acute infarct. Moderate chronic microvascular ischemic changes.  - Neurology evaluated her, their impression is orthostatics as brain imaging is negative and the dizziness is worse with movement   - PT recommended Rehab facility   - TSH, folate, B12 normal  - started on meclizine 25 mg X3     #DVT of LLE  #hx of Breast cancer and lymphoma s/p surgery in 2004  - LE duplex prelim reading positive for DVT on left peroneal vein (8/18)  - Start Eliquis 10 mg BID for one week then 5 mg BID    # Positive UA  - dysuria  - continue Cipro 500 mg BID   - WBC 12K  - Urine culture negative    # HTN   - Lower metoprolol to 25 mg once daily at nighttime  - c/w Nifedipine in the morning    # GERD   - continue famotidine       # DVT prophylaxis: Lovenox  # GI prophylaxis: Famotidine  # Diet: DASH/TLC  # Activity: as tolerated  # Code status: Full Code  # Disposition: short term rehab in SNF    Pending: f/u placement of short term rehab in SNF

## 2023-08-19 LAB
ANION GAP SERPL CALC-SCNC: 13 MMOL/L — SIGNIFICANT CHANGE UP (ref 7–14)
BUN SERPL-MCNC: 39 MG/DL — HIGH (ref 10–20)
CALCIUM SERPL-MCNC: 9.2 MG/DL — SIGNIFICANT CHANGE UP (ref 8.4–10.4)
CHLORIDE SERPL-SCNC: 103 MMOL/L — SIGNIFICANT CHANGE UP (ref 98–110)
CO2 SERPL-SCNC: 23 MMOL/L — SIGNIFICANT CHANGE UP (ref 17–32)
CREAT SERPL-MCNC: 1.6 MG/DL — HIGH (ref 0.7–1.5)
EGFR: 31 ML/MIN/1.73M2 — LOW
GLUCOSE SERPL-MCNC: 93 MG/DL — SIGNIFICANT CHANGE UP (ref 70–99)
HCT VFR BLD CALC: 40.7 % — SIGNIFICANT CHANGE UP (ref 37–47)
HGB BLD-MCNC: 12.8 G/DL — SIGNIFICANT CHANGE UP (ref 12–16)
MAGNESIUM SERPL-MCNC: 1.9 MG/DL — SIGNIFICANT CHANGE UP (ref 1.8–2.4)
MCHC RBC-ENTMCNC: 28 PG — SIGNIFICANT CHANGE UP (ref 27–31)
MCHC RBC-ENTMCNC: 31.4 G/DL — LOW (ref 32–37)
MCV RBC AUTO: 89.1 FL — SIGNIFICANT CHANGE UP (ref 81–99)
NRBC # BLD: 0 /100 WBCS — SIGNIFICANT CHANGE UP (ref 0–0)
PLATELET # BLD AUTO: 313 K/UL — SIGNIFICANT CHANGE UP (ref 130–400)
PMV BLD: 11.5 FL — HIGH (ref 7.4–10.4)
POTASSIUM SERPL-MCNC: 4.2 MMOL/L — SIGNIFICANT CHANGE UP (ref 3.5–5)
POTASSIUM SERPL-SCNC: 4.2 MMOL/L — SIGNIFICANT CHANGE UP (ref 3.5–5)
RBC # BLD: 4.57 M/UL — SIGNIFICANT CHANGE UP (ref 4.2–5.4)
RBC # FLD: 14.4 % — SIGNIFICANT CHANGE UP (ref 11.5–14.5)
SODIUM SERPL-SCNC: 139 MMOL/L — SIGNIFICANT CHANGE UP (ref 135–146)
WBC # BLD: 9 K/UL — SIGNIFICANT CHANGE UP (ref 4.8–10.8)
WBC # FLD AUTO: 9 K/UL — SIGNIFICANT CHANGE UP (ref 4.8–10.8)

## 2023-08-19 PROCEDURE — 71045 X-RAY EXAM CHEST 1 VIEW: CPT | Mod: 26

## 2023-08-19 PROCEDURE — 99232 SBSQ HOSP IP/OBS MODERATE 35: CPT

## 2023-08-19 RX ORDER — SODIUM CHLORIDE 9 MG/ML
1000 INJECTION, SOLUTION INTRAVENOUS
Refills: 0 | Status: DISCONTINUED | OUTPATIENT
Start: 2023-08-19 | End: 2023-08-21

## 2023-08-19 RX ORDER — PHENAZOPYRIDINE HCL 100 MG
100 TABLET ORAL THREE TIMES A DAY
Refills: 0 | Status: DISCONTINUED | OUTPATIENT
Start: 2023-08-19 | End: 2023-08-19

## 2023-08-19 RX ORDER — ACETAMINOPHEN 500 MG
650 TABLET ORAL EVERY 6 HOURS
Refills: 0 | Status: DISCONTINUED | OUTPATIENT
Start: 2023-08-19 | End: 2023-08-26

## 2023-08-19 RX ORDER — CIPROFLOXACIN LACTATE 400MG/40ML
500 VIAL (ML) INTRAVENOUS EVERY 12 HOURS
Refills: 0 | Status: DISCONTINUED | OUTPATIENT
Start: 2023-08-19 | End: 2023-08-21

## 2023-08-19 RX ADMIN — Medication 500 MILLIGRAM(S): at 05:14

## 2023-08-19 RX ADMIN — APIXABAN 10 MILLIGRAM(S): 2.5 TABLET, FILM COATED ORAL at 18:32

## 2023-08-19 RX ADMIN — Medication 650 MILLIGRAM(S): at 12:34

## 2023-08-19 RX ADMIN — Medication 25 MILLIGRAM(S): at 14:23

## 2023-08-19 RX ADMIN — Medication 25 MILLIGRAM(S): at 21:20

## 2023-08-19 RX ADMIN — SODIUM CHLORIDE 75 MILLILITER(S): 9 INJECTION, SOLUTION INTRAVENOUS at 10:28

## 2023-08-19 RX ADMIN — APIXABAN 10 MILLIGRAM(S): 2.5 TABLET, FILM COATED ORAL at 05:16

## 2023-08-19 RX ADMIN — FAMOTIDINE 20 MILLIGRAM(S): 10 INJECTION INTRAVENOUS at 12:34

## 2023-08-19 RX ADMIN — Medication 25 MILLIGRAM(S): at 05:15

## 2023-08-19 RX ADMIN — Medication 500 MILLIGRAM(S): at 18:32

## 2023-08-19 RX ADMIN — Medication 650 MILLIGRAM(S): at 21:20

## 2023-08-19 RX ADMIN — Medication 30 MILLIGRAM(S): at 05:15

## 2023-08-19 NOTE — PROGRESS NOTE ADULT - SUBJECTIVE AND OBJECTIVE BOX
LEIGHA GAN  87y  Female      Patient is a 87y old  Female who presents with a chief complaint of Dizziness     INTERVAL HPI/OVERNIGHT EVENTS:      ******************************* REVIEW OF SYSTEMS:**********************************************    All other review of systems negative    *********************** VITALS ******************************************    T(F): 100.1 (08-19-23 @ 11:11)  HR: 86 (08-19-23 @ 04:12) (86 - 103)  BP: 137/65 (08-19-23 @ 04:12) (127/58 - 137/65)  RR: 19 (08-19-23 @ 04:12) (18 - 19)  SpO2: 98% (08-19-23 @ 04:12) (94% - 99%)            ******************************** PHYSICAL EXAM:**************************************************  GENERAL: NAD    PSYCH: no agitation, baseline mentation  HEENT:     NERVOUS SYSTEM:  Alert & Oriented X3,    PULMONARY: CHELITA, CTA    CARDIOVASCULAR: S1S2 RRR    GI: Soft, NT, ND; BS present.    EXTREMITIES:  2+ Peripheral Pulses, No clubbing, cyanosis, or edema    LYMPH: No lymphadenopathy noted    SKIN: No rashes or lesions      **************************** LABS *******************************************************                          12.8   9.00  )-----------( 313      ( 19 Aug 2023 06:28 )             40.7     08-19    139  |  103  |  39<H>  ----------------------------<  93  4.2   |  23  |  1.6<H>    Ca    9.2      19 Aug 2023 06:28  Mg     1.9     08-19        Urinalysis Basic - ( 19 Aug 2023 06:28 )    Color: x / Appearance: x / SG: x / pH: x  Gluc: 93 mg/dL / Ketone: x  / Bili: x / Urobili: x   Blood: x / Protein: x / Nitrite: x   Leuk Esterase: x / RBC: x / WBC x   Sq Epi: x / Non Sq Epi: x / Bacteria: x        Lactate Trend        CAPILLARY BLOOD GLUCOSE              **************************Active Medications *******************************************  Bactrim (Hives)  Percocet 5/325 (Other)  penicillin (Short breath; Hives)      acetaminophen     Tablet .. 650 milliGRAM(s) Oral every 6 hours PRN  apixaban 10 milliGRAM(s) Oral every 12 hours  ciprofloxacin     Tablet 500 milliGRAM(s) Oral every 12 hours  famotidine    Tablet 20 milliGRAM(s) Oral daily  lactated ringers. 1000 milliLiter(s) IV Continuous <Continuous>  meclizine 25 milliGRAM(s) Oral three times a day  metoprolol succinate ER 25 milliGRAM(s) Oral daily  NIFEdipine XL 30 milliGRAM(s) Oral daily      ***************************************************  RADIOLOGY & ADDITIONAL TESTS:    Imaging Personally Reviewed:  [ ] YES  [ ] NO    HEALTH ISSUES - PROBLEM Dx:

## 2023-08-19 NOTE — PROGRESS NOTE ADULT - SUBJECTIVE AND OBJECTIVE BOX
24H events:    Patient is a 87y old Female who presents with a chief complaint of Dizziness (15 Aug 2023 15:49)    Primary diagnosis of Dizziness    Vital showed high /90.   CT head and MR brain negative for acute pathology. PT recommended Rehab facility. LLE duplex positive for DVT of left peroneal vein. Patient spiked fevers. f/u BCx, CXR, RVP, and UCx      Today is hospital day 4d. This morning patient was seen and examined at bedside, resting comfortably in bed.    No acute or major events overnight.    Code Status: FULL    Family communication:  Contact date: 8/16  Relationship to patient: Niece    PAST MEDICAL & SURGICAL HISTORY  Prolapsed uterus    Hernia    HTN (hypertension)    GERD (gastroesophageal reflux disease)    Breast CA    No significant past surgical history      SOCIAL HISTORY:  Social History:  Lives alone in [place] (15 Aug 2023 15:49)      ALLERGIES:  Bactrim (Hives)  penicillin (Short breath; Hives)    MEDICATIONS:  STANDING MEDICATIONS  apixaban 10 milliGRAM(s) Oral every 12 hours  ciprofloxacin     Tablet 500 milliGRAM(s) Oral every 12 hours  famotidine    Tablet 20 milliGRAM(s) Oral daily  lactated ringers. 1000 milliLiter(s) IV Continuous <Continuous>  meclizine 25 milliGRAM(s) Oral three times a day  metoprolol succinate ER 25 milliGRAM(s) Oral daily  NIFEdipine XL 30 milliGRAM(s) Oral daily    PRN MEDICATIONS    VITALS:   T(F): 100.1  HR: 86  BP: 137/65  RR: 19  SpO2: 98%      PHYSICAL EXAM:  GENERAL:   ( x ) NAD, lying in bed comfortably     (  ) obtunded     (  ) lethargic     (  ) somnolent    HEAD:   ( x ) Atraumatic     (  ) hematoma     (  ) laceration (specify location:       )     NECK:  ( x ) Supple     (  ) neck stiffness     (  ) nuchal rigidity     (  )  no JVD     (  ) JVD present ( -- cm)    HEART:  Rate -->     ( x ) normal rate     (  ) bradycardic     (  ) tachycardic  Rhythm -->     ( x ) regular     (  ) regularly irregular     (  ) irregularly irregular  Murmurs -->     ( x ) normal s1s2     (  ) systolic murmur     (  ) diastolic murmur     (  ) continuous murmur      (  ) S3 present     (  ) S4 present    LUNGS:   ( x )Unlabored respirations     (  ) tachypnea  ( x ) B/L air entry     (  ) decreased breath sounds in:  (location     )    (  ) no adventitious sound     (  ) crackles     (  ) wheezing      (  ) rhonchi      (specify location:       )  (  ) chest wall tenderness (specify location:       )    ABDOMEN:   ( x ) Soft     (  ) tense   |   ( x ) nondistended     (  ) distended   |   (  ) +BS     (  ) hypoactive bowel sounds     (  ) hyperactive bowel sounds  ( x ) nontender     (  ) RUQ tenderness     (  ) RLQ tenderness     (  ) LLQ tenderness     (  ) epigastric tenderness     (  ) diffuse tenderness  (  ) Splenomegaly      (  ) Hepatomegaly      (  ) Jaundice     (  ) ecchymosis     EXTREMITIES:  ( x ) Normal     (  ) Rash     (  ) ecchymosis     (  ) varicose veins      (  ) pitting edema     (  ) non-pitting edema   (  ) ulceration     (  ) gangrene:     (location:     )    NERVOUS SYSTEM:    ( x ) A&Ox3     (  ) confused     (  ) lethargic      AMPA score:      LABS:                        12.8   9.00  )-----------( 313      ( 19 Aug 2023 06:28 )             40.7     08-19    139  |  103  |  39<H>  ----------------------------<  93  4.2   |  23  |  1.6<H>    Ca    9.2      19 Aug 2023 06:28  Mg     1.9     08-19        Urinalysis Basic - ( 19 Aug 2023 06:28 )    Color: x / Appearance: x / SG: x / pH: x  Gluc: 93 mg/dL / Ketone: x  / Bili: x / Urobili: x   Blood: x / Protein: x / Nitrite: x   Leuk Esterase: x / RBC: x / WBC x   Sq Epi: x / Non Sq Epi: x / Bacteria: x

## 2023-08-19 NOTE — PROGRESS NOTE ADULT - ASSESSMENT
86yo F PMHx HTN, breast cancer and lymphoma s/p surgery in 2004, glaucoma, presenting for worsening dizziness and unsteadiness over the past week.       # Dizziness and Unsteadiness 2/2 Orthostatic Hypotension vs Vertigo  # Bilateral lower limb weakness   - Positive orthostatics. Repeat orthostatics on 8/17 were negative. patient felt dizzy, unable to complete repeat orthostatics on (8/18)  - Labs unremarkable  - CT head showed: Mild progression of cerebral atrophy. Mild progression of periventricular and internal and external capsule hypodensity likely representing ischemic change, age indeterminant.  - MR Head No Cont No evidence of acute infarct. Moderate chronic microvascular ischemic changes.  - Neurology evaluated her, their impression is orthostatics as brain imaging is negative and the dizziness is worse with movement   - PT recommended Rehab facility   - TSH, folate, B12 normal  - started on meclizine 25 mg X3     #DVT of LLE  #hx of Breast cancer and lymphoma s/p surgery in 2004  - LE duplex prelim reading positive for DVT on left peroneal vein (8/18)  - Start Eliquis 10 mg BID for one week then 5 mg BID    #Fever  - f/u CXR, RVP, repeat UCx  - Tylenol 650 mg PO q6 hours PRN    # Positive UA  - dysuria  - continue Cipro 500 mg BID   - WBC 12K  - Urine culture negative. f/u repeat UCx    # HTN   - Lower metoprolol to 25 mg once daily at nighttime  - c/w Nifedipine in the morning    # GERD   - continue famotidine       # DVT prophylaxis: Lovenox  # GI prophylaxis: Famotidine  # Diet: DASH/TLC  # Activity: as tolerated  # Code status: Full Code  # Disposition: short term rehab in SNF    Pending: f/u CXR, RVP, UCx, BCx, f/u placement of short term rehab in SNF

## 2023-08-19 NOTE — PROGRESS NOTE ADULT - ASSESSMENT
88yo F PMHx HTN, breast cancer and lymphoma s/p surgery in 2004, glaucoma, presenting for worsening dizziness and unsteadiness over the past week.      # Bilateral lower limb weakness/ with dizziness    likely due to Orthostatic Hypotension and BPPV     Acute CVA ruled out.    -  orthostatics vitals positive   - CT head showed: Mild progression of cerebral atrophy. Mild progression of periventricular and internal and external capsule hypodensity likely representing ischemic change, age indeterminant.  - MR Head No Cont No evidence of acute infarct. Moderate chronic microvascular ischemic changes.  - Neurology evaluated her, their impression is orthostatics as brain imaging is negative and the dizziness is worse with movement   - PT recommended Rehab facility   -  TSH, folate, B12  >>WNL   - started on meclizine 25 mg X3   - BP meds adjusted, Teds stockings     # Possible Cystitis   - continue Cipro 500 mg BID  ( allergic to PCN )  - WBC 12K  -  urine cultures  >> Normal urogenital kelvin     # New onset Fever Tmax 102.7    > Will trend fever curve, CXR, UA, Ucx, blood cx     # Left peroneal DVT >> would start A/C given hx of Lymphoma and Breast ca,     # HTN   - On home nifedipine and metoprolol ER  - continue home medications     # GERD   - continue famotidine     # DVT prophylaxis: Lovenox  # GI prophylaxis: Famotidine  # Diet: DASH/TLC  # Activity: as tolerated  # Code status: Full Code  # Disposition: rehab    Pending: New onset fever / Urine cx/ Blood cx   Dispo: STR

## 2023-08-20 LAB
ANION GAP SERPL CALC-SCNC: 14 MMOL/L — SIGNIFICANT CHANGE UP (ref 7–14)
BUN SERPL-MCNC: 32 MG/DL — HIGH (ref 10–20)
CALCIUM SERPL-MCNC: 8.9 MG/DL — SIGNIFICANT CHANGE UP (ref 8.4–10.5)
CHLORIDE SERPL-SCNC: 98 MMOL/L — SIGNIFICANT CHANGE UP (ref 98–110)
CO2 SERPL-SCNC: 21 MMOL/L — SIGNIFICANT CHANGE UP (ref 17–32)
CREAT SERPL-MCNC: 1.2 MG/DL — SIGNIFICANT CHANGE UP (ref 0.7–1.5)
EGFR: 44 ML/MIN/1.73M2 — LOW
GLUCOSE SERPL-MCNC: 105 MG/DL — HIGH (ref 70–99)
HCT VFR BLD CALC: 41 % — SIGNIFICANT CHANGE UP (ref 37–47)
HGB BLD-MCNC: 13.3 G/DL — SIGNIFICANT CHANGE UP (ref 12–16)
MAGNESIUM SERPL-MCNC: 1.6 MG/DL — LOW (ref 1.8–2.4)
MCHC RBC-ENTMCNC: 28.1 PG — SIGNIFICANT CHANGE UP (ref 27–31)
MCHC RBC-ENTMCNC: 32.4 G/DL — SIGNIFICANT CHANGE UP (ref 32–37)
MCV RBC AUTO: 86.7 FL — SIGNIFICANT CHANGE UP (ref 81–99)
NRBC # BLD: 0 /100 WBCS — SIGNIFICANT CHANGE UP (ref 0–0)
PLATELET # BLD AUTO: 303 K/UL — SIGNIFICANT CHANGE UP (ref 130–400)
PMV BLD: 11 FL — HIGH (ref 7.4–10.4)
POTASSIUM SERPL-MCNC: 4.4 MMOL/L — SIGNIFICANT CHANGE UP (ref 3.5–5)
POTASSIUM SERPL-SCNC: 4.4 MMOL/L — SIGNIFICANT CHANGE UP (ref 3.5–5)
RBC # BLD: 4.73 M/UL — SIGNIFICANT CHANGE UP (ref 4.2–5.4)
RBC # FLD: 13.7 % — SIGNIFICANT CHANGE UP (ref 11.5–14.5)
SODIUM SERPL-SCNC: 133 MMOL/L — LOW (ref 135–146)
WBC # BLD: 9.77 K/UL — SIGNIFICANT CHANGE UP (ref 4.8–10.8)
WBC # FLD AUTO: 9.77 K/UL — SIGNIFICANT CHANGE UP (ref 4.8–10.8)

## 2023-08-20 PROCEDURE — 99232 SBSQ HOSP IP/OBS MODERATE 35: CPT

## 2023-08-20 RX ADMIN — Medication 500 MILLIGRAM(S): at 05:13

## 2023-08-20 RX ADMIN — APIXABAN 10 MILLIGRAM(S): 2.5 TABLET, FILM COATED ORAL at 17:43

## 2023-08-20 RX ADMIN — APIXABAN 10 MILLIGRAM(S): 2.5 TABLET, FILM COATED ORAL at 05:13

## 2023-08-20 RX ADMIN — Medication 500 MILLIGRAM(S): at 17:42

## 2023-08-20 RX ADMIN — FAMOTIDINE 20 MILLIGRAM(S): 10 INJECTION INTRAVENOUS at 12:36

## 2023-08-20 RX ADMIN — Medication 25 MILLIGRAM(S): at 05:13

## 2023-08-20 RX ADMIN — Medication 650 MILLIGRAM(S): at 22:09

## 2023-08-20 RX ADMIN — Medication 30 MILLIGRAM(S): at 05:13

## 2023-08-20 RX ADMIN — Medication 25 MILLIGRAM(S): at 14:17

## 2023-08-20 RX ADMIN — Medication 25 MILLIGRAM(S): at 22:10

## 2023-08-20 NOTE — PROGRESS NOTE ADULT - SUBJECTIVE AND OBJECTIVE BOX
LEIGHA GAN  87y  Female      Patient is a 87y old  Female who presents with a chief complaint of Dizziness      INTERVAL HPI/OVERNIGHT EVENTS:      ******************************* REVIEW OF SYSTEMS:**********************************************    All other review of systems negative    *********************** VITALS ******************************************    T(F): 96.1 (08-20-23 @ 05:00)  HR: 89 (08-20-23 @ 05:00) (80 - 89)  BP: 141/68 (08-20-23 @ 05:00) (132/61 - 152/67)  RR: 18 (08-20-23 @ 05:00) (18 - 19)  SpO2: 96% (08-20-23 @ 05:00) (95% - 96%)    08-19-23 @ 07:01  -  08-20-23 @ 07:00  --------------------------------------------------------  IN: 0 mL / OUT: 150 mL / NET: -150 mL            08-19-23 @ 07:01  -  08-20-23 @ 07:00  --------------------------------------------------------  IN: 0 mL / OUT: 150 mL / NET: -150 mL        ******************************** PHYSICAL EXAM:**************************************************  GENERAL: NAD    PSYCH: no agitation, baseline mentation  HEENT:     NERVOUS SYSTEM:  Alert & Oriented X3,  PULMONARY: CHELITA, CTA    CARDIOVASCULAR: S1S2 RRR    GI: Soft, NT, ND; BS present.    EXTREMITIES:  2+ Peripheral Pulses, No clubbing, cyanosis, or edema    LYMPH: No lymphadenopathy noted    SKIN: No rashes or lesions      **************************** LABS *******************************************************                          13.3   9.77  )-----------( 303      ( 20 Aug 2023 07:43 )             41.0     08-20    133<L>  |  98  |  32<H>  ----------------------------<  105<H>  4.4   |  21  |  1.2    Ca    8.9      20 Aug 2023 07:43  Mg     1.6     08-20        Urinalysis Basic - ( 20 Aug 2023 07:43 )    Color: x / Appearance: x / SG: x / pH: x  Gluc: 105 mg/dL / Ketone: x  / Bili: x / Urobili: x   Blood: x / Protein: x / Nitrite: x   Leuk Esterase: x / RBC: x / WBC x   Sq Epi: x / Non Sq Epi: x / Bacteria: x        Lactate Trend        CAPILLARY BLOOD GLUCOSE              **************************Active Medications *******************************************  Bactrim (Hives)  Percocet 5/325 (Other)  penicillin (Short breath; Hives)      acetaminophen     Tablet .. 650 milliGRAM(s) Oral every 6 hours PRN  apixaban 10 milliGRAM(s) Oral every 12 hours  ciprofloxacin     Tablet 500 milliGRAM(s) Oral every 12 hours  famotidine    Tablet 20 milliGRAM(s) Oral daily  lactated ringers. 1000 milliLiter(s) IV Continuous <Continuous>  meclizine 25 milliGRAM(s) Oral three times a day  metoprolol succinate ER 25 milliGRAM(s) Oral daily  NIFEdipine XL 30 milliGRAM(s) Oral daily      ***************************************************  RADIOLOGY & ADDITIONAL TESTS:    Imaging Personally Reviewed:  [ ] YES  [ ] NO    HEALTH ISSUES - PROBLEM Dx:

## 2023-08-20 NOTE — PROGRESS NOTE ADULT - ASSESSMENT
86yo F PMHx HTN, breast cancer and lymphoma s/p surgery in 2004, glaucoma, presenting for worsening dizziness and unsteadiness over the past week.      # Bilateral lower limb weakness/ with dizziness    likely due to Orthostatic Hypotension and BPPV     Acute CVA ruled out.    -  orthostatics vitals positive   - CT head showed: Mild progression of cerebral atrophy. Mild progression of periventricular and internal and external capsule hypodensity likely representing ischemic change, age indeterminant.  - MR Head No Cont No evidence of acute infarct. Moderate chronic microvascular ischemic changes.  - Neurology evaluated her, their impression is orthostatics as brain imaging is negative and the dizziness is worse with movement   - PT recommended Rehab facility   -  TSH, folate, B12  >>WNL   - started on meclizine 25 mg X3   - BP meds adjusted, Teds stockings     # Possible Cystitis   - continue Cipro 500 mg BID  ( allergic to PCN )  -  urine cultures  >> Normal urogenital kelvin >> repeated as became symptomatic and febrile as well      will follow repeat cx     # New onset Fever Tmax 102.7    > Will trend fever curve, CXR, UA, Ucx, blood cx     # Left peroneal DVT >> would start A/C given hx of Lymphoma and Breast ca,     # HTN   - On home nifedipine and metoprolol ER  - continue home medications     # GERD   - continue famotidine     # DVT prophylaxis: Lovenox  # GI prophylaxis: Famotidine  # Diet: DASH/TLC  # Activity: as tolerated  # Code status: Full Code  # Disposition: rehab    Pending: New onset fever / Urine cx/ Blood cx   Dispo: STR

## 2023-08-21 LAB
ALBUMIN SERPL ELPH-MCNC: 3.6 G/DL — SIGNIFICANT CHANGE UP (ref 3.5–5.2)
ALP SERPL-CCNC: 54 U/L — SIGNIFICANT CHANGE UP (ref 30–115)
ALT FLD-CCNC: 9 U/L — SIGNIFICANT CHANGE UP (ref 0–41)
ANION GAP SERPL CALC-SCNC: 17 MMOL/L — HIGH (ref 7–14)
AST SERPL-CCNC: 26 U/L — SIGNIFICANT CHANGE UP (ref 0–41)
BILIRUB DIRECT SERPL-MCNC: <0.2 MG/DL — SIGNIFICANT CHANGE UP (ref 0–0.3)
BILIRUB INDIRECT FLD-MCNC: >0.2 MG/DL — SIGNIFICANT CHANGE UP (ref 0.2–1.2)
BILIRUB SERPL-MCNC: 0.4 MG/DL — SIGNIFICANT CHANGE UP (ref 0.2–1.2)
BUN SERPL-MCNC: 45 MG/DL — HIGH (ref 10–20)
CALCIUM SERPL-MCNC: 8.9 MG/DL — SIGNIFICANT CHANGE UP (ref 8.4–10.5)
CHLORIDE SERPL-SCNC: 102 MMOL/L — SIGNIFICANT CHANGE UP (ref 98–110)
CO2 SERPL-SCNC: 15 MMOL/L — LOW (ref 17–32)
CREAT SERPL-MCNC: 1.5 MG/DL — SIGNIFICANT CHANGE UP (ref 0.7–1.5)
CREAT SERPL-MCNC: 1.6 MG/DL — HIGH (ref 0.7–1.5)
CRP SERPL-MCNC: 59.9 MG/L — HIGH
D DIMER BLD IA.RAPID-MCNC: 479 NG/ML DDU — HIGH
EGFR: 31 ML/MIN/1.73M2 — LOW
EGFR: 34 ML/MIN/1.73M2 — LOW
GLUCOSE SERPL-MCNC: 88 MG/DL — SIGNIFICANT CHANGE UP (ref 70–99)
HCT VFR BLD CALC: 40.6 % — SIGNIFICANT CHANGE UP (ref 37–47)
HGB BLD-MCNC: 13.1 G/DL — SIGNIFICANT CHANGE UP (ref 12–16)
MAGNESIUM SERPL-MCNC: 1.9 MG/DL — SIGNIFICANT CHANGE UP (ref 1.8–2.4)
MCHC RBC-ENTMCNC: 28.1 PG — SIGNIFICANT CHANGE UP (ref 27–31)
MCHC RBC-ENTMCNC: 32.3 G/DL — SIGNIFICANT CHANGE UP (ref 32–37)
MCV RBC AUTO: 87.1 FL — SIGNIFICANT CHANGE UP (ref 81–99)
NRBC # BLD: 0 /100 WBCS — SIGNIFICANT CHANGE UP (ref 0–0)
PLATELET # BLD AUTO: 269 K/UL — SIGNIFICANT CHANGE UP (ref 130–400)
PMV BLD: 11.2 FL — HIGH (ref 7.4–10.4)
POTASSIUM SERPL-MCNC: 4.8 MMOL/L — SIGNIFICANT CHANGE UP (ref 3.5–5)
POTASSIUM SERPL-SCNC: 4.8 MMOL/L — SIGNIFICANT CHANGE UP (ref 3.5–5)
PROT SERPL-MCNC: 6.9 G/DL — SIGNIFICANT CHANGE UP (ref 6–8)
RAPID RVP RESULT: DETECTED
RBC # BLD: 4.66 M/UL — SIGNIFICANT CHANGE UP (ref 4.2–5.4)
RBC # FLD: 14 % — SIGNIFICANT CHANGE UP (ref 11.5–14.5)
SARS-COV-2 RNA SPEC QL NAA+PROBE: DETECTED
SODIUM SERPL-SCNC: 134 MMOL/L — LOW (ref 135–146)
WBC # BLD: 11.54 K/UL — HIGH (ref 4.8–10.8)
WBC # FLD AUTO: 11.54 K/UL — HIGH (ref 4.8–10.8)

## 2023-08-21 PROCEDURE — 99232 SBSQ HOSP IP/OBS MODERATE 35: CPT

## 2023-08-21 RX ORDER — AZTREONAM 2 G
VIAL (EA) INJECTION
Refills: 0 | Status: DISCONTINUED | OUTPATIENT
Start: 2023-08-21 | End: 2023-08-22

## 2023-08-21 RX ORDER — AZTREONAM 2 G
500 VIAL (EA) INJECTION ONCE
Refills: 0 | Status: COMPLETED | OUTPATIENT
Start: 2023-08-21 | End: 2023-08-21

## 2023-08-21 RX ORDER — GUAIFENESIN/DEXTROMETHORPHAN 600MG-30MG
10 TABLET, EXTENDED RELEASE 12 HR ORAL EVERY 4 HOURS
Refills: 0 | Status: DISCONTINUED | OUTPATIENT
Start: 2023-08-21 | End: 2023-08-26

## 2023-08-21 RX ORDER — AZTREONAM 2 G
500 VIAL (EA) INJECTION EVERY 12 HOURS
Refills: 0 | Status: DISCONTINUED | OUTPATIENT
Start: 2023-08-22 | End: 2023-08-22

## 2023-08-21 RX ORDER — REMDESIVIR 5 MG/ML
INJECTION INTRAVENOUS
Refills: 0 | Status: DISCONTINUED | OUTPATIENT
Start: 2023-08-21 | End: 2023-08-22

## 2023-08-21 RX ORDER — BUDESONIDE AND FORMOTEROL FUMARATE DIHYDRATE 160; 4.5 UG/1; UG/1
2 AEROSOL RESPIRATORY (INHALATION)
Refills: 0 | Status: DISCONTINUED | OUTPATIENT
Start: 2023-08-21 | End: 2023-08-29

## 2023-08-21 RX ORDER — SODIUM CHLORIDE 9 MG/ML
500 INJECTION, SOLUTION INTRAVENOUS ONCE
Refills: 0 | Status: COMPLETED | OUTPATIENT
Start: 2023-08-21 | End: 2023-08-21

## 2023-08-21 RX ADMIN — Medication 50 MILLIGRAM(S): at 21:34

## 2023-08-21 RX ADMIN — Medication 25 MILLIGRAM(S): at 05:44

## 2023-08-21 RX ADMIN — APIXABAN 10 MILLIGRAM(S): 2.5 TABLET, FILM COATED ORAL at 05:44

## 2023-08-21 RX ADMIN — Medication 25 MILLIGRAM(S): at 13:22

## 2023-08-21 RX ADMIN — Medication 25 MILLIGRAM(S): at 21:28

## 2023-08-21 RX ADMIN — Medication 30 MILLIGRAM(S): at 05:44

## 2023-08-21 RX ADMIN — APIXABAN 10 MILLIGRAM(S): 2.5 TABLET, FILM COATED ORAL at 18:59

## 2023-08-21 RX ADMIN — SODIUM CHLORIDE 250 MILLILITER(S): 9 INJECTION, SOLUTION INTRAVENOUS at 15:31

## 2023-08-21 RX ADMIN — Medication 500 MILLIGRAM(S): at 05:44

## 2023-08-21 RX ADMIN — FAMOTIDINE 20 MILLIGRAM(S): 10 INJECTION INTRAVENOUS at 11:47

## 2023-08-21 NOTE — PROGRESS NOTE ADULT - ASSESSMENT
86yo F PMHx HTN, breast cancer and lymphoma s/p surgery in 2004, glaucoma, presenting for worsening dizziness and unsteadiness over the past week.      # Bilateral lower limb weakness/ with dizziness    likely due to Orthostatic Hypotension and BPPV     Acute CVA ruled out.    -  orthostatics vitals positive   - CT head showed: Mild progression of cerebral atrophy. Mild progression of periventricular and internal and external capsule hypodensity likely representing ischemic change, age indeterminant.  - MR Head No Cont No evidence of acute infarct. Moderate chronic microvascular ischemic changes.  - Neurology evaluated her, their impression is orthostatics as brain imaging is negative and the dizziness is worse with movement   - PT recommended Rehab facility   -  TSH, folate, B12  >>WNL   - started on meclizine 25 mg X3   - BP meds adjusted, Teds stockings     # Possible Cystitis   - continue Cipro 500 mg BID  ( allergic to PCN )  -  urine cultures  >> Normal urogenital kelvin >> repeated as became symptomatic and febrile as well      will follow repeat cx     # New onset Sepsis Fever Tmax 102.7      Likely due to UTI /COVID    > Ucx, >> > 100k E coli  >Would start Meropenem for now  then de escalate as per sensitivity           blood cx ngtd      F/U Inflammatory markers, if high consider Remdesivir of Paxlovid     # Left peroneal DVT >> would start A/C given hx of Lymphoma and Breast ca,     # HTN   - On home nifedipine and metoprolol ER  - continue home medications     # GERD   - continue famotidine     # DVT prophylaxis: Lovenox  # GI prophylaxis: Famotidine  # Diet: DASH/TLC  # Activity: as tolerated  # Code status: Full Code  # Disposition: rehab    Pending: sepsis / New onset Covid   Dispo: STR

## 2023-08-21 NOTE — PROGRESS NOTE ADULT - ASSESSMENT
Assessment and Plan:   · Assessment	  86yo F PMHx HTN, breast cancer and lymphoma s/p surgery in 2004, glaucoma, presenting for worsening dizziness and unsteadiness over the past week. COVID +ve on 8/20      # Bilateral lower limb weakness/ with dizziness    likely due to Orthostatic Hypotension and BPPV     Acute CVA ruled out.    -  orthostatics vitals positive   - CT head showed: Mild progression of cerebral atrophy. Mild progression of periventricular and internal and external capsule hypodensity likely representing ischemic change, age indeterminant.  - MR Head No Cont No evidence of acute infarct. Moderate chronic microvascular ischemic changes.  - Neurology evaluated her, their impression is orthostatics as brain imaging is negative and the dizziness is worse with movement   - PT recommended Rehab facility   -  TSH, folate, B12  >>WNL   - started on meclizine 25 mg X3   - BP meds adjusted, Teds stockings     # Possible Cystitis   - continue Cipro 500 mg BID  ( allergic to PCN )  -  urine cultures  >> Normal urogenital kelvin >> repeated as became symptomatic and febrile as well      will follow repeat cx     # New onset Sepsis Fever Tmax 102.7      Likely due to UTI /COVID    > Ucx, >> > 100k E coli  >Would start Meropenem for now  then de escalate as per sensitivity           blood cx ngtd      F/U Inflammatory markers, if high consider Remdesivir of Paxlovid   ID consulted for Remdesivir    # Left peroneal DVT >> would start A/C given hx of Lymphoma and Breast ca,     # HTN   - On home nifedipine and metoprolol ER  - continue home medications     # GERD   - continue famotidine     # DVT prophylaxis: Lovenox  # GI prophylaxis: Famotidine  # Diet: DASH/TLC  # Activity: as tolerated  # Code status: Full Code  # Disposition: rehab    Pending: sepsis / New onset Covid   Dispo: STR

## 2023-08-21 NOTE — PROGRESS NOTE ADULT - SUBJECTIVE AND OBJECTIVE BOX
24H events:    Patient is a 87y old Female who presents with a chief complaint of Dizziness (21 Aug 2023 14:20)    Primary diagnosis of Dizziness    Today is hospital day 6d. This morning patient was seen and examined at bedside, resting comfortably in bed.    No acute or major events overnight.    Code Status:    Family communication:  Contact date:  Name of person contacted:  Relationship to patient:  Communication details:  What matters most:    PAST MEDICAL & SURGICAL HISTORY  Prolapsed uterus    Hernia    HTN (hypertension)    GERD (gastroesophageal reflux disease)    Breast CA    No significant past surgical history      SOCIAL HISTORY:  Social History:  Lives alone in [place] (15 Aug 2023 15:49)      ALLERGIES:  Bactrim (Hives)  penicillin (Short breath; Hives)    MEDICATIONS:  STANDING MEDICATIONS  apixaban 10 milliGRAM(s) Oral every 12 hours  aztreonam  IVPB 500 milliGRAM(s) IV Intermittent once  aztreonam  IVPB      budesonide 160 MICROgram(s)/formoterol 4.5 MICROgram(s) Inhaler 2 Puff(s) Inhalation two times a day  famotidine    Tablet 20 milliGRAM(s) Oral daily  lactated ringers Bolus 500 milliLiter(s) IV Bolus once  meclizine 25 milliGRAM(s) Oral three times a day  metoprolol succinate ER 25 milliGRAM(s) Oral daily  NIFEdipine XL 30 milliGRAM(s) Oral daily  remdesivir  IVPB   IV Intermittent     PRN MEDICATIONS  acetaminophen     Tablet .. 650 milliGRAM(s) Oral every 6 hours PRN  guaifenesin/dextromethorphan Oral Liquid 10 milliLiter(s) Oral every 4 hours PRN    VITALS:   T(F): 100.5  HR: 92  BP: 141/79  RR: 18  SpO2: 98%    PHYSICAL EXAM:  GENERAL:   ( x ) NAD, lying in bed comfortably     (  ) obtunded     (  ) lethargic     (  ) somnolent    HEAD:   ( x ) Atraumatic     (  ) hematoma     (  ) laceration (specify location:       )     NECK:  (  ) Supple     (  ) neck stiffness     (  ) nuchal rigidity     (  )  no JVD     (  ) JVD present ( -- cm)    HEART:  Rate -->     (x  ) normal rate     (  ) bradycardic     (  ) tachycardic  Rhythm -->     ( x ) regular     (  ) regularly irregular     (  ) irregularly irregular  Murmurs -->     ( x ) normal s1s2     (  ) systolic murmur     (  ) diastolic murmur     (  ) continuous murmur      (  ) S3 present     (  ) S4 present    LUNGS:   ( x )Unlabored respirations     (  ) tachypnea  ( x ) B/L air entry     (  ) decreased breath sounds in:  (location     )    (  ) no adventitious sound     (  ) crackles     (  ) wheezing      (  ) rhonchi      (specify location:       )  (  ) chest wall tenderness (specify location:       )    ABDOMEN:   (  ) Soft     (  ) tense   |   (  ) nondistended     (  ) distended   |   (  ) +BS     (  ) hypoactive bowel sounds     (  ) hyperactive bowel sounds  (  ) nontender     (  ) RUQ tenderness     (  ) RLQ tenderness     (  ) LLQ tenderness     (  ) epigastric tenderness     (  ) diffuse tenderness  (  ) Splenomegaly      (  ) Hepatomegaly      (  ) Jaundice     (  ) ecchymosis     EXTREMITIES:  (  ) Normal     (  ) Rash     (  ) ecchymosis     (  ) varicose veins      (  ) pitting edema     (  ) non-pitting edema   (  ) ulceration     (  ) gangrene:     (location:     )    NERVOUS SYSTEM:    ( x ) A&Ox3     (  ) confused     (  ) lethargic  CN II-XII:     (  ) Intact     (  ) deficits found     (Specify:     )   Upper extremities:     (  ) no sensorimotor deficits     (  ) weakness     (  ) loss of proprioception/vibration     (  ) loss of touch/temperature (specify:    )  Lower extremities:     (  ) no sensorimotor deficits     (  ) weakness     (  ) loss of proprioception/vibration     (  ) loss of touch/temperature (specify:    )    SKIN:   (  ) No rashes or lesions     (  ) maculopapular rash     (  ) pustules     (  ) vesicles     (  ) ulcer     (  ) ecchymosis     (specify location:     )    AMPAC score:    (  ) Indwelling Ibrahim Catheter:   Date insterted:    Reason (  ) Critical illness     (  ) urinary retention    (  ) Accurate Ins/Outs Monitoring     (  ) CMO patient    (  ) Central Line:   Date inserted:  Location: (  ) Right IJ     (  ) Left IJ     (  ) Right Fem     (  ) Left Fem    (  ) SPC        (  ) pigtail       (  ) PEG tube       (  ) colostomy       (  ) jejunostomy  (  ) U-Dall    LABS:                        13.1   11.54 )-----------( 269      ( 21 Aug 2023 06:40 )             40.6     08-21    x   |  x   |  x   ----------------------------<  x   x    |  x   |  1.5    Ca    8.9      21 Aug 2023 06:40  Mg     1.9     08-21    TPro  6.9  /  Alb  3.6  /  TBili  0.4  /  DBili  <0.2  /  AST  26  /  ALT  9   /  AlkPhos  54  08-21      Urinalysis Basic - ( 21 Aug 2023 06:40 )    Color: x / Appearance: x / SG: x / pH: x  Gluc: 88 mg/dL / Ketone: x  / Bili: x / Urobili: x   Blood: x / Protein: x / Nitrite: x   Leuk Esterase: x / RBC: x / WBC x   Sq Epi: x / Non Sq Epi: x / Bacteria: x            Culture - Urine (collected 19 Aug 2023 18:39)  Source: Clean Catch Clean Catch (Midstream)  Preliminary Report (21 Aug 2023 11:22):    >100,000 CFU/ml Escherichia coli    Culture - Blood (collected 18 Aug 2023 21:05)  Source: .Blood Blood-Peripheral  Preliminary Report (21 Aug 2023 02:02):    No growth at 48 Hours          RADIOLOGY:

## 2023-08-21 NOTE — PROGRESS NOTE ADULT - SUBJECTIVE AND OBJECTIVE BOX
LEIGHA GAN  87y  Female      Patient is a 87y old  Female who presents with a chief complaint of Dizziness (20 Aug 2023 11:49)      INTERVAL HPI/OVERNIGHT EVENTS:      ******************************* REVIEW OF SYSTEMS:**********************************************    All other review of systems negative    *********************** VITALS ******************************************    T(F): 96 (08-21-23 @ 05:00)  HR: 84 (08-21-23 @ 05:00) (84 - 104)  BP: 134/75 (08-21-23 @ 05:00) (124/57 - 134/75)  RR: 18 (08-21-23 @ 05:00) (18 - 18)  SpO2: 96% (08-21-23 @ 05:00) (96% - 96%)            ******************************** PHYSICAL EXAM:**************************************************  GENERAL: NAD    PSYCH: no agitation, baseline mentation  HEENT:     NERVOUS SYSTEM:  Alert & Oriented X3, MS  5/5 B/L  UE and LE ; Sensory intact    PULMONARY: CHELITA, CTA    CARDIOVASCULAR: S1S2 RRR    GI: Soft, NT, ND; BS present.    EXTREMITIES:  2+ Peripheral Pulses, No clubbing, cyanosis, or edema    LYMPH: No lymphadenopathy noted    SKIN: No rashes or lesions      **************************** LABS *******************************************************                          13.1   11.54 )-----------( 269      ( 21 Aug 2023 06:40 )             40.6     08-21    x   |  x   |  x   ----------------------------<  x   x    |  x   |  1.5    Ca    8.9      21 Aug 2023 06:40  Mg     1.9     08-21    TPro  6.9  /  Alb  3.6  /  TBili  0.4  /  DBili  <0.2  /  AST  26  /  ALT  9   /  AlkPhos  54  08-21      Urinalysis Basic - ( 21 Aug 2023 06:40 )    Color: x / Appearance: x / SG: x / pH: x  Gluc: 88 mg/dL / Ketone: x  / Bili: x / Urobili: x   Blood: x / Protein: x / Nitrite: x   Leuk Esterase: x / RBC: x / WBC x   Sq Epi: x / Non Sq Epi: x / Bacteria: x        Lactate Trend        CAPILLARY BLOOD GLUCOSE              **************************Active Medications *******************************************  Bactrim (Hives)  Percocet 5/325 (Other)  penicillin (Short breath; Hives)      acetaminophen     Tablet .. 650 milliGRAM(s) Oral every 6 hours PRN  apixaban 10 milliGRAM(s) Oral every 12 hours  budesonide 160 MICROgram(s)/formoterol 4.5 MICROgram(s) Inhaler 2 Puff(s) Inhalation two times a day  ciprofloxacin     Tablet 500 milliGRAM(s) Oral every 12 hours  famotidine    Tablet 20 milliGRAM(s) Oral daily  guaifenesin/dextromethorphan Oral Liquid 10 milliLiter(s) Oral every 4 hours PRN  lactated ringers. 1000 milliLiter(s) IV Continuous <Continuous>  meclizine 25 milliGRAM(s) Oral three times a day  metoprolol succinate ER 25 milliGRAM(s) Oral daily  NIFEdipine XL 30 milliGRAM(s) Oral daily  remdesivir  IVPB   IV Intermittent       ***************************************************  RADIOLOGY & ADDITIONAL TESTS:    Imaging Personally Reviewed:  [ ] YES  [ ] NO    HEALTH ISSUES - PROBLEM Dx:

## 2023-08-22 LAB
-  AMIKACIN: SIGNIFICANT CHANGE UP
-  AMOXICILLIN/CLAVULANIC ACID: SIGNIFICANT CHANGE UP
-  AMPICILLIN/SULBACTAM: SIGNIFICANT CHANGE UP
-  AMPICILLIN: SIGNIFICANT CHANGE UP
-  AZTREONAM: SIGNIFICANT CHANGE UP
-  CEFAZOLIN: SIGNIFICANT CHANGE UP
-  CEFEPIME: SIGNIFICANT CHANGE UP
-  CEFOXITIN: SIGNIFICANT CHANGE UP
-  CEFTRIAXONE: SIGNIFICANT CHANGE UP
-  CEFUROXIME: SIGNIFICANT CHANGE UP
-  CIPROFLOXACIN: SIGNIFICANT CHANGE UP
-  ERTAPENEM: SIGNIFICANT CHANGE UP
-  GENTAMICIN: SIGNIFICANT CHANGE UP
-  IMIPENEM: SIGNIFICANT CHANGE UP
-  LEVOFLOXACIN: SIGNIFICANT CHANGE UP
-  MEROPENEM: SIGNIFICANT CHANGE UP
-  NITROFURANTOIN: SIGNIFICANT CHANGE UP
-  PIPERACILLIN/TAZOBACTAM: SIGNIFICANT CHANGE UP
-  TOBRAMYCIN: SIGNIFICANT CHANGE UP
-  TRIMETHOPRIM/SULFAMETHOXAZOLE: SIGNIFICANT CHANGE UP
ALBUMIN SERPL ELPH-MCNC: 2.7 G/DL — LOW (ref 3.5–5.2)
ALBUMIN SERPL ELPH-MCNC: 2.8 G/DL — LOW (ref 3.5–5.2)
ALBUMIN SERPL ELPH-MCNC: 3.3 G/DL — LOW (ref 3.5–5.2)
ALP SERPL-CCNC: 45 U/L — SIGNIFICANT CHANGE UP (ref 30–115)
ALP SERPL-CCNC: 47 U/L — SIGNIFICANT CHANGE UP (ref 30–115)
ALP SERPL-CCNC: 56 U/L — SIGNIFICANT CHANGE UP (ref 30–115)
ALT FLD-CCNC: 7 U/L — SIGNIFICANT CHANGE UP (ref 0–41)
ALT FLD-CCNC: 8 U/L — SIGNIFICANT CHANGE UP (ref 0–41)
ALT FLD-CCNC: 9 U/L — SIGNIFICANT CHANGE UP (ref 0–41)
ANION GAP SERPL CALC-SCNC: 15 MMOL/L — HIGH (ref 7–14)
ANION GAP SERPL CALC-SCNC: 17 MMOL/L — HIGH (ref 7–14)
AST SERPL-CCNC: 22 U/L — SIGNIFICANT CHANGE UP (ref 0–41)
AST SERPL-CCNC: 24 U/L — SIGNIFICANT CHANGE UP (ref 0–41)
AST SERPL-CCNC: 24 U/L — SIGNIFICANT CHANGE UP (ref 0–41)
BASOPHILS # BLD AUTO: 0.03 K/UL — SIGNIFICANT CHANGE UP (ref 0–0.2)
BASOPHILS NFR BLD AUTO: 0.2 % — SIGNIFICANT CHANGE UP (ref 0–1)
BILIRUB DIRECT SERPL-MCNC: <0.2 MG/DL — SIGNIFICANT CHANGE UP (ref 0–0.3)
BILIRUB DIRECT SERPL-MCNC: <0.2 MG/DL — SIGNIFICANT CHANGE UP (ref 0–0.3)
BILIRUB INDIRECT FLD-MCNC: >0 MG/DL — LOW (ref 0.2–1.2)
BILIRUB INDIRECT FLD-MCNC: SIGNIFICANT CHANGE UP MG/DL (ref 0.2–1.2)
BILIRUB SERPL-MCNC: 0.2 MG/DL — SIGNIFICANT CHANGE UP (ref 0.2–1.2)
BILIRUB SERPL-MCNC: 0.2 MG/DL — SIGNIFICANT CHANGE UP (ref 0.2–1.2)
BILIRUB SERPL-MCNC: 0.3 MG/DL — SIGNIFICANT CHANGE UP (ref 0.2–1.2)
BUN SERPL-MCNC: 45 MG/DL — HIGH (ref 10–20)
BUN SERPL-MCNC: 48 MG/DL — HIGH (ref 10–20)
CALCIUM SERPL-MCNC: 8 MG/DL — LOW (ref 8.4–10.5)
CALCIUM SERPL-MCNC: 9 MG/DL — SIGNIFICANT CHANGE UP (ref 8.4–10.5)
CHLORIDE SERPL-SCNC: 100 MMOL/L — SIGNIFICANT CHANGE UP (ref 98–110)
CHLORIDE SERPL-SCNC: 87 MMOL/L — LOW (ref 98–110)
CO2 SERPL-SCNC: 14 MMOL/L — LOW (ref 17–32)
CO2 SERPL-SCNC: 15 MMOL/L — LOW (ref 17–32)
CREAT SERPL-MCNC: 1.5 MG/DL — SIGNIFICANT CHANGE UP (ref 0.7–1.5)
CREAT SERPL-MCNC: 1.6 MG/DL — HIGH (ref 0.7–1.5)
CREAT SERPL-MCNC: 1.7 MG/DL — HIGH (ref 0.7–1.5)
CULTURE RESULTS: SIGNIFICANT CHANGE UP
EGFR: 29 ML/MIN/1.73M2 — LOW
EGFR: 31 ML/MIN/1.73M2 — LOW
EGFR: 34 ML/MIN/1.73M2 — LOW
EOSINOPHIL # BLD AUTO: 0.01 K/UL — SIGNIFICANT CHANGE UP (ref 0–0.7)
EOSINOPHIL NFR BLD AUTO: 0.1 % — SIGNIFICANT CHANGE UP (ref 0–8)
FERRITIN SERPL-MCNC: 313 NG/ML — SIGNIFICANT CHANGE UP (ref 13–330)
GLUCOSE BLDC GLUCOMTR-MCNC: 101 MG/DL — HIGH (ref 70–99)
GLUCOSE SERPL-MCNC: 528 MG/DL — CRITICAL HIGH (ref 70–99)
GLUCOSE SERPL-MCNC: 90 MG/DL — SIGNIFICANT CHANGE UP (ref 70–99)
HCT VFR BLD CALC: 36.7 % — LOW (ref 37–47)
HGB BLD-MCNC: 11.8 G/DL — LOW (ref 12–16)
IMM GRANULOCYTES NFR BLD AUTO: 0.5 % — HIGH (ref 0.1–0.3)
LYMPHOCYTES # BLD AUTO: 1.12 K/UL — LOW (ref 1.2–3.4)
LYMPHOCYTES # BLD AUTO: 9.2 % — LOW (ref 20.5–51.1)
MAGNESIUM SERPL-MCNC: 1.8 MG/DL — SIGNIFICANT CHANGE UP (ref 1.8–2.4)
MCHC RBC-ENTMCNC: 28.2 PG — SIGNIFICANT CHANGE UP (ref 27–31)
MCHC RBC-ENTMCNC: 32.2 G/DL — SIGNIFICANT CHANGE UP (ref 32–37)
MCV RBC AUTO: 87.8 FL — SIGNIFICANT CHANGE UP (ref 81–99)
METHOD TYPE: SIGNIFICANT CHANGE UP
MONOCYTES # BLD AUTO: 1.39 K/UL — HIGH (ref 0.1–0.6)
MONOCYTES NFR BLD AUTO: 11.4 % — HIGH (ref 1.7–9.3)
NEUTROPHILS # BLD AUTO: 9.53 K/UL — HIGH (ref 1.4–6.5)
NEUTROPHILS NFR BLD AUTO: 78.6 % — HIGH (ref 42.2–75.2)
NRBC # BLD: 0 /100 WBCS — SIGNIFICANT CHANGE UP (ref 0–0)
ORGANISM # SPEC MICROSCOPIC CNT: SIGNIFICANT CHANGE UP
ORGANISM # SPEC MICROSCOPIC CNT: SIGNIFICANT CHANGE UP
PLATELET # BLD AUTO: 289 K/UL — SIGNIFICANT CHANGE UP (ref 130–400)
PMV BLD: 11.3 FL — HIGH (ref 7.4–10.4)
POTASSIUM SERPL-MCNC: 4.2 MMOL/L — SIGNIFICANT CHANGE UP (ref 3.5–5)
POTASSIUM SERPL-MCNC: 4.9 MMOL/L — SIGNIFICANT CHANGE UP (ref 3.5–5)
POTASSIUM SERPL-SCNC: 4.2 MMOL/L — SIGNIFICANT CHANGE UP (ref 3.5–5)
POTASSIUM SERPL-SCNC: 4.9 MMOL/L — SIGNIFICANT CHANGE UP (ref 3.5–5)
PROCALCITONIN SERPL-MCNC: 0.18 NG/ML — HIGH (ref 0.02–0.1)
PROT SERPL-MCNC: 5.4 G/DL — LOW (ref 6–8)
PROT SERPL-MCNC: 5.5 G/DL — LOW (ref 6–8)
PROT SERPL-MCNC: 6.6 G/DL — SIGNIFICANT CHANGE UP (ref 6–8)
RBC # BLD: 4.18 M/UL — LOW (ref 4.2–5.4)
RBC # FLD: 14.2 % — SIGNIFICANT CHANGE UP (ref 11.5–14.5)
SODIUM SERPL-SCNC: 117 MMOL/L — CRITICAL LOW (ref 135–146)
SODIUM SERPL-SCNC: 131 MMOL/L — LOW (ref 135–146)
SPECIMEN SOURCE: SIGNIFICANT CHANGE UP
WBC # BLD: 12.14 K/UL — HIGH (ref 4.8–10.8)
WBC # FLD AUTO: 12.14 K/UL — HIGH (ref 4.8–10.8)

## 2023-08-22 PROCEDURE — 99232 SBSQ HOSP IP/OBS MODERATE 35: CPT

## 2023-08-22 RX ORDER — SODIUM CHLORIDE 9 MG/ML
250 INJECTION INTRAMUSCULAR; INTRAVENOUS; SUBCUTANEOUS ONCE
Refills: 0 | Status: COMPLETED | OUTPATIENT
Start: 2023-08-22 | End: 2023-08-22

## 2023-08-22 RX ORDER — NITROFURANTOIN MACROCRYSTAL 50 MG
100 CAPSULE ORAL
Refills: 0 | Status: COMPLETED | OUTPATIENT
Start: 2023-08-22 | End: 2023-08-27

## 2023-08-22 RX ORDER — INSULIN LISPRO 100/ML
8 VIAL (ML) SUBCUTANEOUS ONCE
Refills: 0 | Status: DISCONTINUED | OUTPATIENT
Start: 2023-08-22 | End: 2023-08-22

## 2023-08-22 RX ORDER — REMDESIVIR 5 MG/ML
200 INJECTION INTRAVENOUS EVERY 24 HOURS
Refills: 0 | Status: COMPLETED | OUTPATIENT
Start: 2023-08-22 | End: 2023-08-22

## 2023-08-22 RX ORDER — REMDESIVIR 5 MG/ML
INJECTION INTRAVENOUS
Refills: 0 | Status: COMPLETED | OUTPATIENT
Start: 2023-08-22 | End: 2023-08-24

## 2023-08-22 RX ORDER — NITROFURANTOIN MACROCRYSTAL 50 MG
50 CAPSULE ORAL
Refills: 0 | Status: DISCONTINUED | OUTPATIENT
Start: 2023-08-22 | End: 2023-08-22

## 2023-08-22 RX ORDER — REMDESIVIR 5 MG/ML
200 INJECTION INTRAVENOUS EVERY 24 HOURS
Refills: 0 | Status: DISCONTINUED | OUTPATIENT
Start: 2023-08-21 | End: 2023-08-22

## 2023-08-22 RX ORDER — REMDESIVIR 5 MG/ML
100 INJECTION INTRAVENOUS EVERY 24 HOURS
Refills: 0 | Status: COMPLETED | OUTPATIENT
Start: 2023-08-23 | End: 2023-08-24

## 2023-08-22 RX ORDER — CHLORHEXIDINE GLUCONATE 213 G/1000ML
1 SOLUTION TOPICAL
Refills: 0 | Status: DISCONTINUED | OUTPATIENT
Start: 2023-08-22 | End: 2023-08-29

## 2023-08-22 RX ADMIN — Medication 30 MILLIGRAM(S): at 05:48

## 2023-08-22 RX ADMIN — CHLORHEXIDINE GLUCONATE 1 APPLICATION(S): 213 SOLUTION TOPICAL at 11:21

## 2023-08-22 RX ADMIN — FAMOTIDINE 20 MILLIGRAM(S): 10 INJECTION INTRAVENOUS at 11:22

## 2023-08-22 RX ADMIN — Medication 25 MILLIGRAM(S): at 13:58

## 2023-08-22 RX ADMIN — Medication 25 MILLIGRAM(S): at 21:15

## 2023-08-22 RX ADMIN — Medication 50 MILLIGRAM(S): at 06:02

## 2023-08-22 RX ADMIN — APIXABAN 10 MILLIGRAM(S): 2.5 TABLET, FILM COATED ORAL at 05:48

## 2023-08-22 RX ADMIN — Medication 25 MILLIGRAM(S): at 05:48

## 2023-08-22 RX ADMIN — REMDESIVIR 200 MILLIGRAM(S): 5 INJECTION INTRAVENOUS at 15:40

## 2023-08-22 RX ADMIN — SODIUM CHLORIDE 250 MILLILITER(S): 9 INJECTION INTRAMUSCULAR; INTRAVENOUS; SUBCUTANEOUS at 10:32

## 2023-08-22 RX ADMIN — APIXABAN 10 MILLIGRAM(S): 2.5 TABLET, FILM COATED ORAL at 17:10

## 2023-08-22 NOTE — PROGRESS NOTE ADULT - ASSESSMENT
86yo F PMHx HTN, breast cancer and lymphoma s/p surgery in 2004, glaucoma, presenting for worsening dizziness and unsteadiness over the past week.      # Bilateral lower limb weakness/ with dizziness    likely due to Orthostatic Hypotension and BPPV     Acute CVA ruled out.    -  orthostatics vitals positive  >> Neg   - CT head showed: Mild progression of cerebral atrophy. Mild progression of periventricular and internal and external capsule hypodensity likely representing ischemic change, age indeterminant.  - MR Head No Cont No evidence of acute infarct. Moderate chronic microvascular ischemic changes.  - Neurology evaluated her, their impression is orthostatics as brain imaging is negative and the dizziness is worse with movement   - PT recommended Rehab facility   -  TSH, folate, B12  >>WNL   - started on meclizine 25 mg X3   - BP meds adjusted, Teds stockings     # Possible Cystitis   - continue Cipro 500 mg BID  ( allergic to PCN )  -  urine cultures  >> Normal urogenital kelvin >> repeated as became symptomatic and febrile as well      repeat cx  > Ecoli     # New onset Sepsis Fever Tmax 102.7      Likely due to UTI /COVID    > Ucx, >> > 100k E coli  > Macrobid 50mg bid x 5 days as per ID      blood cx ngtd      Inflammatory markers NOTED , CRP 59      Starting RDV X 3 DAYS as per ID     # Left peroneal DVT >> would start A/C given hx of Lymphoma and Breast ca,     # HTN   - On home nifedipine and metoprolol ER  - continue home medications     # GERD   - continue famotidine     # DVT prophylaxis: Lovenox  # GI prophylaxis: Famotidine  # Diet: DASH/TLC  # Activity: as tolerated  # Code status: Full Code  # Disposition: rehab    Pending: sepsis/ RDV / New onset Covid   Dispo: STR

## 2023-08-22 NOTE — PROGRESS NOTE ADULT - SUBJECTIVE AND OBJECTIVE BOX
24H events:    Patient is a 87y old Female who presents with a chief complaint of Dizziness (22 Aug 2023 12:34)    Primary diagnosis of Dizziness    Day 6   No major events as per note      Today is hospital day 7d. This morning patient was seen and examined at bedside, resting comfortably in bed.      No acute or major events overnight. Hemodynamically stable, tolerating oral diet, voiding appropriately with appropriate bowel movements.       PAST MEDICAL & SURGICAL HISTORY  Prolapsed uterus  Hernia  HTN (hypertension)  GERD (gastroesophageal reflux disease)  Breast CA  No significant past surgical history      SOCIAL HISTORY:  Social History:  Lives alone in [place] (15 Aug 2023 15:49)      ALLERGIES:  Bactrim (Hives)  penicillin (Short breath; Hives)    MEDICATIONS:  STANDING MEDICATIONS  apixaban 10 milliGRAM(s) Oral every 12 hours  aztreonam  IVPB      aztreonam  IVPB 500 milliGRAM(s) IV Intermittent every 12 hours  budesonide 160 MICROgram(s)/formoterol 4.5 MICROgram(s) Inhaler 2 Puff(s) Inhalation two times a day  chlorhexidine 2% Cloths 1 Application(s) Topical <User Schedule>  famotidine    Tablet 20 milliGRAM(s) Oral daily  meclizine 25 milliGRAM(s) Oral three times a day  metoprolol succinate ER 25 milliGRAM(s) Oral daily  NIFEdipine XL 30 milliGRAM(s) Oral daily    PRN MEDICATIONS  acetaminophen     Tablet .. 650 milliGRAM(s) Oral every 6 hours PRN  guaifenesin/dextromethorphan Oral Liquid 10 milliLiter(s) Oral every 4 hours PRN    VITALS:   T(F): 96.7  HR: 87  BP: 158/74  RR: 18  SpO2: 96%    PHYSICAL EXAM:  GENERAL: NAD, lying in bed comfortably, obtunded, lethargic,  somnolent, cooperative, elderly  HEART: Regular rate and rhythm, normal S1/S2, no murmurs, heaves, thrills  LUNGS: No acute respiratory distress, patient breathing on room air  ABDOMEN:  soft, non-tender, non-distented, + BS, no hepatosplenomegaly   GENITOURINARY: patient still reports burning on urination  EXTREMITIES: no rashes, extremities warm/dry, no cyanosis, no edema, ulcerations or ecchymosis  NERVOUS SYSTEM:  A&Ox3, CNII-XII intace, follows commands, answers questions appropriately   SKIN: No rashes or lesions         LABS:                        11.8   12.14 )-----------( 289      ( 22 Aug 2023 06:52 )             36.7     08-22    117<LL>  |  87<L>  |  45<H>  ----------------------------<  528<HH>  4.2   |  15<L>  |  1.6<H>    Ca    8.0<L>      22 Aug 2023 06:52  Mg     1.8     08-22    TPro  5.5<L>  /  Alb  2.8<L>  /  TBili  0.2  /  DBili  <0.2  /  AST  24  /  ALT  8   /  AlkPhos  47  08-22    Ddimers 476  procal 0.18  Ferritin 313  CRP 59.9    Urinalysis Basic - ( 22 Aug 2023 06:52 )    Color: x / Appearance: x / SG: x / pH: x  Gluc: 528 mg/dL / Ketone: x  / Bili: x / Urobili: x   Blood: x / Protein: x / Nitrite: x   Leuk Esterase: x / RBC: x / WBC x   Sq Epi: x / Non Sq Epi: x / Bacteria: x      Culture - Urine (collected 19 Aug 2023 18:39)  Source: Clean Catch Clean Catch (Midstream)  Preliminary Report (21 Aug 2023 11:22):    >100,000 CFU/ml Escherichia coli

## 2023-08-22 NOTE — CONSULT NOTE ADULT - ASSESSMENT
86yo F PMHx HTN, breast cancer and lymphoma s/p surgery in 2004, glaucoma, presenting for worsening dizziness and unsteadiness over the past week.    IMPRESSION/RECOMMENDATIONS  COVID-19 with nonpulmonary symptoms  Pt is in the early viral replicative phase based on the timeline/onset of symptoms.   Not vaccinated   no GGO  Clinically no cytokine release although markers elevated  Ddimers 476  procal 0.18  Ferritin 313  CRP 59.9  Possible cystitis. No pyelonephritis  8/19 UCx E coli  8/18 BCx NG    - mg iv on Day 1, then 100 mg iv D2 and D3.  -no steroids  -macrobid 50 mg q12h for 5 days  -Off loading to prevent pressure sores and preventive measures to avoid aspiration     Please do not hesitate to recall ID if any questions arise either through [employer] or through [employer]

## 2023-08-22 NOTE — PROGRESS NOTE ADULT - ASSESSMENT
86yo F PMHx HTN, breast cancer and lymphoma s/p surgery in 2004, glaucoma, presenting for worsening dizziness and unsteadiness over the past week.      # Bilateral lower limb weakness/ with dizziness    likely due to Orthostatic Hypotension and BPPV     Acute CVA ruled out.    -  orthostatics vitals positive  >> Neg   - CT head showed: Mild progression of cerebral atrophy. Mild progression of periventricular and internal and external capsule hypodensity likely representing ischemic change, age indeterminant.  - MR Head No Cont No evidence of acute infarct. Moderate chronic microvascular ischemic changes.  - Neurology evaluated her, their impression is orthostatics as brain imaging is negative and the dizziness is worse with movement   - PT recommended Rehab facility   -  TSH, folate, B12  >>WNL   - started on meclizine 25 mg X3   - BP meds adjusted, Teds stockings     # Possible Cystitis   - continue Cipro 500 mg BID  ( allergic to PCN )  -  urine cultures  >> Normal urogenital kelvin >> repeated as became symptomatic and febrile as well      repeat cx  > Ecoli     # New onset Sepsis Fever Tmax 102.7      Likely due to UTI /COVID    > Ucx, >> > 100k E coli  > Macrobid 50mg bid x 5 days as per ID      blood cx ngtd      Inflammatory markers NOTED , CRP 59      Starting RDV X 3 DAYS as per ID :  mg iv on Day 1, then 100 mg iv D2 and D3     Off loading to prevent pressure sores and preventive measures to avoid aspiration    # Left peroneal DVT >> would start A/C given hx of Lymphoma and Breast ca,     # HTN   - On home nifedipine and metoprolol ER  - continue home medications     # GERD   - continue famotidine     # DVT prophylaxis: Lovenox  # GI prophylaxis: Famotidine  # Diet: DASH/TLC  # Activity: as tolerated  # Code status: Full Code  # Disposition: rehab    Pending: sepsis/ RDV / New onset Covid   Dispo: STR

## 2023-08-22 NOTE — CONSULT NOTE ADULT - SUBJECTIVE AND OBJECTIVE BOX
LEIGHA GAN  87y, Female  Allergy: Bactrim (Hives)  Percocet 5/325 (Other)  penicillin (Short breath; Hives)      All historical available data reviewed.    HPI:  86yo F PMHx HTN, breast cancer and lymphoma s/p surgery in 2004, glaucoma, presenting for worsening dizziness and unsteadiness over the past week. Dizziness is described as feeling lightheaded, present at rest, worse whenever she bends down to get something, and in the past few days has forced her to hold onto the walls when she is walking around her house. Patient reports feeling more weakness in her legs but no numbness. Denies chest pain, vision changes, SOB, cough, congestion, ear pain, dysuria, hematuria, abdominal pain, vomiting, diarrhea. No recent sickness.     She was admitted to observation  vital signs showed high /90.   negative orthostatics  PT worked with her and recommended Rehab facility   Decision were made that she's not safe for discharge, need to be admitted for possible placement  (15 Aug 2023 15:49)    FAMILY HISTORY:  No pertinent family history in first degree relatives      PAST MEDICAL & SURGICAL HISTORY:  Prolapsed uterus      Hernia      HTN (hypertension)      GERD (gastroesophageal reflux disease)      Breast CA      No significant past surgical history            VITALS:  T(F): 97.3, Max: 100.5 (08-21-23 @ 14:50)  HR: 88  BP: 137/77  RR: 18Vital Signs Last 24 Hrs  T(C): 36.3 (22 Aug 2023 05:32), Max: 38.1 (21 Aug 2023 14:50)  T(F): 97.3 (22 Aug 2023 05:32), Max: 100.5 (21 Aug 2023 14:50)  HR: 88 (22 Aug 2023 05:32) (88 - 92)  BP: 137/77 (22 Aug 2023 05:32) (137/77 - 153/70)  BP(mean): --  RR: 18 (22 Aug 2023 05:32) (18 - 18)  SpO2: 98% (21 Aug 2023 19:58) (98% - 98%)        TESTS & MEASUREMENTS:                        11.8   12.14 )-----------( 289      ( 22 Aug 2023 06:52 )             36.7     08-22    117<LL>  |  87<L>  |  45<H>  ----------------------------<  528<HH>  4.2   |  15<L>  |  1.6<H>    Ca    8.0<L>      22 Aug 2023 06:52  Mg     1.8     08-22    TPro  5.5<L>  /  Alb  2.8<L>  /  TBili  0.2  /  DBili  <0.2  /  AST  24  /  ALT  8   /  AlkPhos  47  08-22    LIVER FUNCTIONS - ( 22 Aug 2023 06:52 )  Alb: 2.8 g/dL / Pro: 5.5 g/dL / ALK PHOS: 47 U/L / ALT: 8 U/L / AST: 24 U/L / GGT: x             Culture - Urine (collected 08-19-23 @ 18:39)  Source: Clean Catch Clean Catch (Midstream)  Preliminary Report (08-21-23 @ 11:22):    >100,000 CFU/ml Escherichia coli    Culture - Blood (collected 08-18-23 @ 21:05)  Source: .Blood Blood-Peripheral  Preliminary Report (08-22-23 @ 02:02):    No growth at 72 Hours      Urinalysis Basic - ( 22 Aug 2023 06:52 )    Color: x / Appearance: x / SG: x / pH: x  Gluc: 528 mg/dL / Ketone: x  / Bili: x / Urobili: x   Blood: x / Protein: x / Nitrite: x   Leuk Esterase: x / RBC: x / WBC x   Sq Epi: x / Non Sq Epi: x / Bacteria: x          RADIOLOGY & ADDITIONAL TESTS:  Personal review of radiological diagnostics performed  Echo and EKG results noted when applicable.     MEDICATIONS:  acetaminophen     Tablet .. 650 milliGRAM(s) Oral every 6 hours PRN  apixaban 10 milliGRAM(s) Oral every 12 hours  aztreonam  IVPB      aztreonam  IVPB 500 milliGRAM(s) IV Intermittent every 12 hours  budesonide 160 MICROgram(s)/formoterol 4.5 MICROgram(s) Inhaler 2 Puff(s) Inhalation two times a day  chlorhexidine 2% Cloths 1 Application(s) Topical <User Schedule>  famotidine    Tablet 20 milliGRAM(s) Oral daily  guaifenesin/dextromethorphan Oral Liquid 10 milliLiter(s) Oral every 4 hours PRN  meclizine 25 milliGRAM(s) Oral three times a day  metoprolol succinate ER 25 milliGRAM(s) Oral daily  NIFEdipine XL 30 milliGRAM(s) Oral daily  remdesivir  IVPB   IV Intermittent   remdesivir  IVPB 200 milliGRAM(s) IV Intermittent every 24 hours  sodium chloride 0.9% Bolus 250 milliLiter(s) IV Bolus once      ANTIBIOTICS:  aztreonam  IVPB      aztreonam  IVPB 500 milliGRAM(s) IV Intermittent every 12 hours  remdesivir  IVPB 200 milliGRAM(s) IV Intermittent every 24 hours  remdesivir  IVPB   IV Intermittent

## 2023-08-22 NOTE — PROGRESS NOTE ADULT - SUBJECTIVE AND OBJECTIVE BOX
LEIGHA GAN  87y  Female      Patient is a 87y old  Female who presents with a chief complaint of Dizziness       INTERVAL HPI/OVERNIGHT EVENTS:      ******************************* REVIEW OF SYSTEMS:**********************************************      All other review of systems negative    *********************** VITALS ******************************************    T(F): 97.3 (08-22-23 @ 05:32)  HR: 88 (08-22-23 @ 05:32) (88 - 92)  BP: 137/77 (08-22-23 @ 05:32) (137/77 - 153/70)  RR: 18 (08-22-23 @ 05:32) (18 - 18)  SpO2: 98% (08-21-23 @ 19:58) (98% - 98%)            ******************************** PHYSICAL EXAM:**************************************************  GENERAL: NAD    PSYCH: no agitation, baseline mentation  HEENT:     NERVOUS SYSTEM:  Alert & Oriented X3,  PULMONARY: CHELITA, CTA    CARDIOVASCULAR: S1S2 RRR    GI: Soft, NT, ND; BS present.    EXTREMITIES:  2+ Peripheral Pulses, No clubbing, cyanosis, or edema    LYMPH: No lymphadenopathy noted    SKIN: No rashes or lesions      **************************** LABS *******************************************************                          11.8   12.14 )-----------( 289      ( 22 Aug 2023 06:52 )             36.7     08-22    117<LL>  |  87<L>  |  45<H>  ----------------------------<  528<HH>  4.2   |  15<L>  |  1.6<H>    Ca    8.0<L>      22 Aug 2023 06:52  Mg     1.8     08-22    TPro  5.5<L>  /  Alb  2.8<L>  /  TBili  0.2  /  DBili  <0.2  /  AST  24  /  ALT  8   /  AlkPhos  47  08-22      Urinalysis Basic - ( 22 Aug 2023 06:52 )    Color: x / Appearance: x / SG: x / pH: x  Gluc: 528 mg/dL / Ketone: x  / Bili: x / Urobili: x   Blood: x / Protein: x / Nitrite: x   Leuk Esterase: x / RBC: x / WBC x   Sq Epi: x / Non Sq Epi: x / Bacteria: x        Lactate Trend        CAPILLARY BLOOD GLUCOSE      POCT Blood Glucose.: 101 mg/dL (22 Aug 2023 09:57)          **************************Active Medications *******************************************  Bactrim (Hives)  Percocet 5/325 (Other)  penicillin (Short breath; Hives)      acetaminophen     Tablet .. 650 milliGRAM(s) Oral every 6 hours PRN  apixaban 10 milliGRAM(s) Oral every 12 hours  aztreonam  IVPB      aztreonam  IVPB 500 milliGRAM(s) IV Intermittent every 12 hours  budesonide 160 MICROgram(s)/formoterol 4.5 MICROgram(s) Inhaler 2 Puff(s) Inhalation two times a day  chlorhexidine 2% Cloths 1 Application(s) Topical <User Schedule>  famotidine    Tablet 20 milliGRAM(s) Oral daily  guaifenesin/dextromethorphan Oral Liquid 10 milliLiter(s) Oral every 4 hours PRN  meclizine 25 milliGRAM(s) Oral three times a day  metoprolol succinate ER 25 milliGRAM(s) Oral daily  NIFEdipine XL 30 milliGRAM(s) Oral daily      ***************************************************  RADIOLOGY & ADDITIONAL TESTS:    Imaging Personally Reviewed:  [ ] YES  [ ] NO    HEALTH ISSUES - PROBLEM Dx:

## 2023-08-23 ENCOUNTER — TRANSCRIPTION ENCOUNTER (OUTPATIENT)
Age: 88
End: 2023-08-23

## 2023-08-23 LAB
ALBUMIN SERPL ELPH-MCNC: 3.3 G/DL — LOW (ref 3.5–5.2)
ALBUMIN SERPL ELPH-MCNC: 3.4 G/DL — LOW (ref 3.5–5.2)
ALP SERPL-CCNC: 61 U/L — SIGNIFICANT CHANGE UP (ref 30–115)
ALP SERPL-CCNC: 62 U/L — SIGNIFICANT CHANGE UP (ref 30–115)
ALT FLD-CCNC: 9 U/L — SIGNIFICANT CHANGE UP (ref 0–41)
ALT FLD-CCNC: 9 U/L — SIGNIFICANT CHANGE UP (ref 0–41)
ANION GAP SERPL CALC-SCNC: 17 MMOL/L — HIGH (ref 7–14)
AST SERPL-CCNC: 24 U/L — SIGNIFICANT CHANGE UP (ref 0–41)
AST SERPL-CCNC: 25 U/L — SIGNIFICANT CHANGE UP (ref 0–41)
BASOPHILS # BLD AUTO: 0.03 K/UL — SIGNIFICANT CHANGE UP (ref 0–0.2)
BASOPHILS NFR BLD AUTO: 0.3 % — SIGNIFICANT CHANGE UP (ref 0–1)
BILIRUB DIRECT SERPL-MCNC: <0.2 MG/DL — SIGNIFICANT CHANGE UP (ref 0–0.3)
BILIRUB INDIRECT FLD-MCNC: >0.1 MG/DL — LOW (ref 0.2–1.2)
BILIRUB SERPL-MCNC: 0.2 MG/DL — SIGNIFICANT CHANGE UP (ref 0.2–1.2)
BILIRUB SERPL-MCNC: 0.3 MG/DL — SIGNIFICANT CHANGE UP (ref 0.2–1.2)
BLD GP AB SCN SERPL QL: SIGNIFICANT CHANGE UP
BUN SERPL-MCNC: 62 MG/DL — CRITICAL HIGH (ref 10–20)
CALCIUM SERPL-MCNC: 9.1 MG/DL — SIGNIFICANT CHANGE UP (ref 8.4–10.5)
CHLORIDE SERPL-SCNC: 99 MMOL/L — SIGNIFICANT CHANGE UP (ref 98–110)
CO2 SERPL-SCNC: 17 MMOL/L — SIGNIFICANT CHANGE UP (ref 17–32)
CREAT SERPL-MCNC: 1.8 MG/DL — HIGH (ref 0.7–1.5)
EGFR: 27 ML/MIN/1.73M2 — LOW
EOSINOPHIL # BLD AUTO: 0.01 K/UL — SIGNIFICANT CHANGE UP (ref 0–0.7)
EOSINOPHIL NFR BLD AUTO: 0.1 % — SIGNIFICANT CHANGE UP (ref 0–8)
GLUCOSE SERPL-MCNC: 105 MG/DL — HIGH (ref 70–99)
HCT VFR BLD CALC: 44 % — SIGNIFICANT CHANGE UP (ref 37–47)
HGB BLD-MCNC: 14.2 G/DL — SIGNIFICANT CHANGE UP (ref 12–16)
IMM GRANULOCYTES NFR BLD AUTO: 0.5 % — HIGH (ref 0.1–0.3)
LYMPHOCYTES # BLD AUTO: 1.66 K/UL — SIGNIFICANT CHANGE UP (ref 1.2–3.4)
LYMPHOCYTES # BLD AUTO: 15 % — LOW (ref 20.5–51.1)
MAGNESIUM SERPL-MCNC: 2.2 MG/DL — SIGNIFICANT CHANGE UP (ref 1.8–2.4)
MCHC RBC-ENTMCNC: 28.5 PG — SIGNIFICANT CHANGE UP (ref 27–31)
MCHC RBC-ENTMCNC: 32.3 G/DL — SIGNIFICANT CHANGE UP (ref 32–37)
MCV RBC AUTO: 88.2 FL — SIGNIFICANT CHANGE UP (ref 81–99)
MONOCYTES # BLD AUTO: 1.59 K/UL — HIGH (ref 0.1–0.6)
MONOCYTES NFR BLD AUTO: 14.3 % — HIGH (ref 1.7–9.3)
NEUTROPHILS # BLD AUTO: 7.75 K/UL — HIGH (ref 1.4–6.5)
NEUTROPHILS NFR BLD AUTO: 69.8 % — SIGNIFICANT CHANGE UP (ref 42.2–75.2)
NRBC # BLD: 0 /100 WBCS — SIGNIFICANT CHANGE UP (ref 0–0)
PLATELET # BLD AUTO: 323 K/UL — SIGNIFICANT CHANGE UP (ref 130–400)
PMV BLD: 11.2 FL — HIGH (ref 7.4–10.4)
POTASSIUM SERPL-MCNC: 4.9 MMOL/L — SIGNIFICANT CHANGE UP (ref 3.5–5)
POTASSIUM SERPL-SCNC: 4.9 MMOL/L — SIGNIFICANT CHANGE UP (ref 3.5–5)
PROT SERPL-MCNC: 6.5 G/DL — SIGNIFICANT CHANGE UP (ref 6–8)
PROT SERPL-MCNC: 6.7 G/DL — SIGNIFICANT CHANGE UP (ref 6–8)
RBC # BLD: 4.99 M/UL — SIGNIFICANT CHANGE UP (ref 4.2–5.4)
RBC # FLD: 14.3 % — SIGNIFICANT CHANGE UP (ref 11.5–14.5)
SODIUM SERPL-SCNC: 133 MMOL/L — LOW (ref 135–146)
WBC # BLD: 11.1 K/UL — HIGH (ref 4.8–10.8)
WBC # FLD AUTO: 11.1 K/UL — HIGH (ref 4.8–10.8)

## 2023-08-23 PROCEDURE — 99232 SBSQ HOSP IP/OBS MODERATE 35: CPT

## 2023-08-23 RX ORDER — SODIUM CHLORIDE 9 MG/ML
1000 INJECTION INTRAMUSCULAR; INTRAVENOUS; SUBCUTANEOUS
Refills: 0 | Status: DISCONTINUED | OUTPATIENT
Start: 2023-08-23 | End: 2023-08-24

## 2023-08-23 RX ORDER — SODIUM BICARBONATE 1 MEQ/ML
325 SYRINGE (ML) INTRAVENOUS THREE TIMES A DAY
Refills: 0 | Status: DISCONTINUED | OUTPATIENT
Start: 2023-08-23 | End: 2023-08-24

## 2023-08-23 RX ORDER — SODIUM CHLORIDE 9 MG/ML
500 INJECTION INTRAMUSCULAR; INTRAVENOUS; SUBCUTANEOUS ONCE
Refills: 0 | Status: COMPLETED | OUTPATIENT
Start: 2023-08-23 | End: 2023-08-23

## 2023-08-23 RX ADMIN — Medication 25 MILLIGRAM(S): at 21:57

## 2023-08-23 RX ADMIN — SODIUM CHLORIDE 500 MILLILITER(S): 9 INJECTION INTRAMUSCULAR; INTRAVENOUS; SUBCUTANEOUS at 02:00

## 2023-08-23 RX ADMIN — Medication 30 MILLIGRAM(S): at 05:26

## 2023-08-23 RX ADMIN — Medication 25 MILLIGRAM(S): at 05:26

## 2023-08-23 RX ADMIN — Medication 25 MILLIGRAM(S): at 05:27

## 2023-08-23 RX ADMIN — APIXABAN 10 MILLIGRAM(S): 2.5 TABLET, FILM COATED ORAL at 05:26

## 2023-08-23 RX ADMIN — SODIUM CHLORIDE 80 MILLILITER(S): 9 INJECTION INTRAMUSCULAR; INTRAVENOUS; SUBCUTANEOUS at 11:06

## 2023-08-23 RX ADMIN — APIXABAN 10 MILLIGRAM(S): 2.5 TABLET, FILM COATED ORAL at 17:00

## 2023-08-23 RX ADMIN — Medication 25 MILLIGRAM(S): at 14:18

## 2023-08-23 RX ADMIN — Medication 100 MILLIGRAM(S): at 06:13

## 2023-08-23 RX ADMIN — FAMOTIDINE 20 MILLIGRAM(S): 10 INJECTION INTRAVENOUS at 11:04

## 2023-08-23 RX ADMIN — CHLORHEXIDINE GLUCONATE 1 APPLICATION(S): 213 SOLUTION TOPICAL at 05:27

## 2023-08-23 RX ADMIN — Medication 325 MILLIGRAM(S): at 22:00

## 2023-08-23 RX ADMIN — REMDESIVIR 200 MILLIGRAM(S): 5 INJECTION INTRAVENOUS at 16:45

## 2023-08-23 NOTE — DISCHARGE NOTE PROVIDER - NSDCMRMEDTOKEN_GEN_ALL_CORE_FT
famotidine 20 mg oral tablet: 1 tab(s) orally once a day  NIFEdipine 30 mg oral tablet, extended release: 1 tab(s) orally once a day  Toprol-XL 50 mg oral tablet, extended release: 1 tab(s) orally once a day (at bedtime)   acetaminophen 325 mg oral tablet: 2 tab(s) orally every 6 hours As needed Temp greater or equal to 38C (100.4F), Mild Pain (1 - 3), Moderate Pain (4 - 6), Severe Pain (7 - 10)  apixaban 5 mg oral tablet: 1 tab(s) orally every 12 hours  budesonide-formoterol 160 mcg-4.5 mcg/inh inhalation aerosol: 2 puff(s) inhaled 2 times a day  famotidine 20 mg oral tablet: 1 tab(s) orally once a day  magnesium gluconate 500 mg oral tablet: 1 tab(s) orally 2 times a day  meclizine 12.5 mg oral tablet: 1 tab(s) orally 2 times a day As needed Dizziness  polyethylene glycol 3350 oral powder for reconstitution: 17 gram(s) orally 2 times a day  senna leaf extract oral tablet: 2 tab(s) orally once a day (at bedtime)  sodium bicarbonate 650 mg oral tablet: 2 tab(s) orally 3 times a day  Toprol-XL 50 mg oral tablet, extended release: 1 tab(s) orally once a day (at bedtime)

## 2023-08-23 NOTE — PROGRESS NOTE ADULT - SUBJECTIVE AND OBJECTIVE BOX
pt seen and examined.     Resident's notes reviewed, My notes supersede resident's notes in case of discrepancy       ROS: no cp, no sob, no n/v, no fever    Vital Signs Last 24 Hrs  T(C): 36.6 (23 Aug 2023 05:05), Max: 37.2 (22 Aug 2023 20:47)  T(F): 97.8 (23 Aug 2023 05:05), Max: 98.9 (22 Aug 2023 20:47)  HR: 97 (23 Aug 2023 05:05) (97 - 97)  BP: 128/71 (23 Aug 2023 05:05) (121/72 - 128/71)  BP(mean): --  RR: 18 (23 Aug 2023 05:05) (18 - 18)  SpO2: 97% (23 Aug 2023 05:05) (97% - 98%)    Parameters below as of 23 Aug 2023 05:05  Patient On (Oxygen Delivery Method): room air    physical exam  constitutional NAD, AAOX3, Respiratory  lungs CTA, CVS heart RRR, GI: abdomen Soft NT, ND, BS+, skin: intact  neuro exam Motor, sensory and CN normal, no deficit     MEDICATIONS  (STANDING):  apixaban 10 milliGRAM(s) Oral every 12 hours  budesonide 160 MICROgram(s)/formoterol 4.5 MICROgram(s) Inhaler 2 Puff(s) Inhalation two times a day  chlorhexidine 2% Cloths 1 Application(s) Topical <User Schedule>  famotidine    Tablet 20 milliGRAM(s) Oral daily  meclizine 25 milliGRAM(s) Oral three times a day  metoprolol succinate ER 25 milliGRAM(s) Oral daily  NIFEdipine XL 30 milliGRAM(s) Oral daily  nitrofurantoin monohydrate/macrocrystals (MACROBID) 100 milliGRAM(s) Oral <User Schedule>  remdesivir  IVPB 100 milliGRAM(s) IV Intermittent every 24 hours  remdesivir  IVPB   IV Intermittent   sodium chloride 0.9%. 1000 milliLiter(s) (80 mL/Hr) IV Continuous <Continuous>    MEDICATIONS  (PRN):  acetaminophen     Tablet .. 650 milliGRAM(s) Oral every 6 hours PRN Temp greater or equal to 38C (100.4F)  guaifenesin/dextromethorphan Oral Liquid 10 milliLiter(s) Oral every 4 hours PRN Cough                        14.2   11.10 )-----------( 323      ( 23 Aug 2023 09:00 )             44.0     08-23    133<L>  |  99  |  62<HH>  ----------------------------<  105<H>  4.9   |  17  |  1.8<H>    Ca    9.1      23 Aug 2023 09:00  Mg     2.2     08-23    TPro  6.7  /  Alb  3.4<L>  /  TBili  0.3  /  DBili  <0.2  /  AST  25  /  ALT  9   /  AlkPhos  62  08-23    D-Dimer Assay, Quantitative: 479 ng/mL DDU (08-21-23 @ 11:54)  Ferritin: 313 ng/mL [13 - 330] (08-21-23 @ 11:54)  Procalcitonin, Serum: 0.18 ng/mL [0.02 - 0.10] (08-21-23 @ 11:54)    Culture - Blood (collected 21 Aug 2023 11:54)  Source: .Blood None  Preliminary Report (22 Aug 2023 23:01):    No growth at 24 hours    a/p  88yo F PMHx HTN, breast cancer and lymphoma s/p surgery in 2004, glaucoma, presenting for worsening dizziness    # dizziness. no vertigo, no nystagmus   possible due to dehydration   check orthostatics   pt eval   ivf   check echo     # covid infection  not hypoxic  ID notes appreciated  cont remdisivir   - mg iv on Day 1, then 100 mg iv D2 and D3    # hx of lymphoma and breast ca, sp surgery cont outpt followup     # hypertension controlled,     # renal insufficiency possible chavo on ckd vs progression of ckd with HAGMA and mild hyponatremia , possible cystitis   creatinine trend  1.8 (08-23-23 @ 09:00)  baseline 1.2  add iVF   consider bicarb po   check kidney and bladder us   check urine studies     #Progress Note Handoff  Pending :  finish course of remdisivir, PT eval   Family discussion: dw pt   Disposition: home   time spent : 35 min

## 2023-08-23 NOTE — CONSULT NOTE ADULT - ASSESSMENT
INCOMPLETE NEPHROLOGY CONSULTATION NOTE 88 yo F PMHx HTN, breast cancer and lymphoma s/p surgery in 2004, glaucoma, presenting for worsening dizziness and unsteadiness over the past week. COVID +ve on 8/20. Nephrology was consulted as the patient had an HERMAN    HERMAN on CKD stage 3 B (from pre-renal )  - On treatment for UTI and COVID  - Cr 1.8  baseline 1.4   - IV fluid hydration preferably LR at 100 ml/hr  - Potassium 4.9   - Start sodium bicarbonate 650 mg  TID as bicarbonate is low in view of CKD   - Get vitamin D and phosphorous levels and PTH levels   - Trend creatinine  - Hbg 14.2- at goal  - US kidney and bladder to r/o obstructive pathology   - Urine electrolytes and urine osmolarity  - Avoid nephrotoxic medications  - Renally dose antibiotics   - Nephrology will follow

## 2023-08-23 NOTE — CONSULT NOTE ADULT - ATTENDING COMMENTS
88 yo F presenting recurrent positional lightheadedness with standing, ambulating or bending forward.  Denies any vertigo or incoordination.  Denies any ataxia.  Lightheadedness improves with laying down.  No focal deficits on exam.  Symptoms still persistent despite IVF/meclizine with minimal improvement.  MRI Brain (-) for acute intracranial pathology.  Suspect lightheadedness secondary to BP issues.  Recommend BP control and no further inpt neurologic w/u at this time.  Call back as needed.
Has hx of CKD in the past   now w/ HERMAN  Cr up a bit today  feels well  plan as above

## 2023-08-23 NOTE — DISCHARGE NOTE PROVIDER - CARE PROVIDER_API CALL
Monique Patrick  Internal Medicine  [address], [address]  [place] [address]  Phone: [phone]  Fax: [phone]  Follow Up Time: 1 week

## 2023-08-23 NOTE — DISCHARGE NOTE PROVIDER - NSDCCPCAREPLAN_GEN_ALL_CORE_FT
PRINCIPAL DISCHARGE DIAGNOSIS  Diagnosis: Dizziness  Assessment and Plan of Treatment: You were admitted for dizziness secondary to orthostatic hypotension.  All the work up was done to rule out any stroke or pathology.  Please follow up wth you outpatient PCP for further management.      SECONDARY DISCHARGE DIAGNOSES  Diagnosis: Acute cystitis  Assessment and Plan of Treatment:     Diagnosis: Pneumonia due to COVID-19 virus  Assessment and Plan of Treatment:

## 2023-08-23 NOTE — CONSULT NOTE ADULT - SUBJECTIVE AND OBJECTIVE BOX
INCOMPLETE NEPHROLOGY CONSULTATION NOTE    Patient is a 87y Female whom presented to the hospital with     PAST MEDICAL & SURGICAL HISTORY:  Prolapsed uterus      Hernia      HTN (hypertension)      GERD (gastroesophageal reflux disease)      Breast CA      No significant past surgical history        Allergies:  Bactrim (Hives)  Percocet 5/325 (Other)  penicillin (Short breath; Hives)    Home Medications Reviewed  Hospital Medications:   MEDICATIONS  (STANDING):  apixaban 10 milliGRAM(s) Oral every 12 hours  budesonide 160 MICROgram(s)/formoterol 4.5 MICROgram(s) Inhaler 2 Puff(s) Inhalation two times a day  chlorhexidine 2% Cloths 1 Application(s) Topical <User Schedule>  famotidine    Tablet 20 milliGRAM(s) Oral daily  meclizine 25 milliGRAM(s) Oral three times a day  metoprolol succinate ER 25 milliGRAM(s) Oral daily  NIFEdipine XL 30 milliGRAM(s) Oral daily  nitrofurantoin monohydrate/macrocrystals (MACROBID) 100 milliGRAM(s) Oral <User Schedule>  remdesivir  IVPB 100 milliGRAM(s) IV Intermittent every 24 hours  remdesivir  IVPB   IV Intermittent   sodium bicarbonate 325 milliGRAM(s) Oral three times a day  sodium chloride 0.9% Bolus 500 milliLiter(s) IV Bolus once  sodium chloride 0.9%. 1000 milliLiter(s) (80 mL/Hr) IV Continuous <Continuous>    SOCIAL HISTORY:  Denies ETOH,Smoking,   FAMILY HISTORY:  No pertinent family history in first degree relatives        REVIEW OF SYSTEMS:  CONSTITUTIONAL: No weakness, fevers or chills  EYES/ENT: No visual changes;  No vertigo or throat pain   NECK: No pain or stiffness  RESPIRATORY: No cough, wheezing, hemoptysis; No shortness of breath  CARDIOVASCULAR: No chest pain or palpitations.  GASTROINTESTINAL: No abdominal or epigastric pain. No nausea, vomiting, or hematemesis; No diarrhea or constipation. No melena or hematochezia.  GENITOURINARY: No dysuria, frequency, foamy urine, urinary urgency, incontinence or hematuria  NEUROLOGICAL: No numbness or weakness  SKIN: No itching, burning, rashes, or lesions   VASCULAR: No bilateral lower extremity edema.   All other review of systems is negative unless indicated above.    VITALS:  Vital Signs Last 24 Hrs  T(C): 36.6 (23 Aug 2023 05:05), Max: 37.2 (22 Aug 2023 20:47)  T(F): 97.8 (23 Aug 2023 05:05), Max: 98.9 (22 Aug 2023 20:47)  HR: 97 (23 Aug 2023 05:05) (97 - 97)  BP: 128/71 (23 Aug 2023 05:05) (121/72 - 128/71)  BP(mean): --  RR: 18 (23 Aug 2023 05:05) (18 - 18)  SpO2: 97% (23 Aug 2023 05:05) (97% - 98%)    Parameters below as of 23 Aug 2023 05:05  Patient On (Oxygen Delivery Method): room air        08-22 @ 07:01  -  08-23 @ 07:00  --------------------------------------------------------  IN: 0 mL / OUT: 300 mL / NET: -300 mL        PHYSICAL EXAM:  Constitutional: NAD  HEENT: anicteric sclera, oropharynx clear, MMM  Neck: No JVD  Respiratory: CTAB, no wheezes, rales or rhonchi  Cardiovascular: S1, S2, RRR  Gastrointestinal: BS+, soft, NT/ND  Extremities: No cyanosis or clubbing. No peripheral edema  Neurological: A/O x 3, no focal deficits  Psychiatric: Normal mood, normal affect  : No CVA tenderness. No falcon.   Skin: No rashes  Vascular Access:    LABS:  08-23    133<L>  |  99  |  62<HH>  ----------------------------<  105<H>  4.9   |  17  |  1.8<H>    Ca    9.1      23 Aug 2023 09:00  Mg     2.2     08-23    TPro  6.7  /  Alb  3.4<L>  /  TBili  0.3  /  DBili  <0.2  /  AST  25  /  ALT  9   /  AlkPhos  62  08-23    Creatinine Trend: 1.8 <--, 1.5 <--, 1.7 <--, 1.6 <--, 1.5 <--, 1.6 <--, 1.2 <--, 1.6 <--, 1.2 <--, 1.0 <--, 1.2 <--, 1.4 <--                        14.2   11.10 )-----------( 323      ( 23 Aug 2023 09:00 )             44.0     Urine Studies:  Urinalysis Basic - ( 23 Aug 2023 09:00 )    Color:  / Appearance:  / SG:  / pH:   Gluc: 105 mg/dL / Ketone:   / Bili:  / Urobili:    Blood:  / Protein:  / Nitrite:    Leuk Esterase:  / RBC:  / WBC    Sq Epi:  / Non Sq Epi:  / Bacteria:                 RADIOLOGY & ADDITIONAL STUDIES:                 NEPHROLOGY CONSULTATION NOTE    88 yo F PMHx HTN, breast cancer and lymphoma s/p surgery in 2004, glaucoma, presenting for worsening dizziness and unsteadiness over the past week. COVID +ve on 8/20. Nephrology was consulted as the patient had an HERMAN    PAST MEDICAL & SURGICAL HISTORY:  Prolapsed uterus  Hernia  HTN (hypertension)  GERD (gastroesophageal reflux disease)  Breast CA    No significant past surgical history    Allergies:  Bactrim (Hives)  Percocet 5/325 (Other)  penicillin (Short breath; Hives)    Home Medications Reviewed  Hospital Medications:   MEDICATIONS  (STANDING):  apixaban 10 milliGRAM(s) Oral every 12 hours  budesonide 160 MICROgram(s)/formoterol 4.5 MICROgram(s) Inhaler 2 Puff(s) Inhalation two times a day  chlorhexidine 2% Cloths 1 Application(s) Topical <User Schedule>  famotidine    Tablet 20 milliGRAM(s) Oral daily  meclizine 25 milliGRAM(s) Oral three times a day  metoprolol succinate ER 25 milliGRAM(s) Oral daily  NIFEdipine XL 30 milliGRAM(s) Oral daily  nitrofurantoin monohydrate/macrocrystals (MACROBID) 100 milliGRAM(s) Oral <User Schedule>  remdesivir  IVPB 100 milliGRAM(s) IV Intermittent every 24 hours  remdesivir  IVPB   IV Intermittent   sodium bicarbonate 325 milliGRAM(s) Oral three times a day  sodium chloride 0.9% Bolus 500 milliLiter(s) IV Bolus once  sodium chloride 0.9%. 1000 milliLiter(s) (80 mL/Hr) IV Continuous <Continuous>    SOCIAL HISTORY:  Denies ETOH,Smoking,   FAMILY HISTORY:  No pertinent family history in first degree relatives        REVIEW OF SYSTEMS:    RESPIRATORY: No cough, wheezing, hemoptysis; No shortness of breath  CARDIOVASCULAR: No chest pain or palpitations.  GASTROINTESTINAL: No abdominal or epigastric pain. No nausea, vomiting, or hematemesis; No diarrhea or constipation. No melena or hematochezia.  GENITOURINARY: No dysuria, frequency, foamy urine, urinary urgency, incontinence or hematuria  NEUROLOGICAL: No numbness or weakness      VITALS:  Vital Signs Last 24 Hrs  T(C): 36.6 (23 Aug 2023 05:05), Max: 37.2 (22 Aug 2023 20:47)  T(F): 97.8 (23 Aug 2023 05:05), Max: 98.9 (22 Aug 2023 20:47)  HR: 97 (23 Aug 2023 05:05) (97 - 97)  BP: 128/71 (23 Aug 2023 05:05) (121/72 - 128/71)  BP(mean): --  RR: 18 (23 Aug 2023 05:05) (18 - 18)  SpO2: 97% (23 Aug 2023 05:05) (97% - 98%)    Parameters below as of 23 Aug 2023 05:05  Patient On (Oxygen Delivery Method): room air        08-22 @ 07:01  -  08-23 @ 07:00  --------------------------------------------------------  IN: 0 mL / OUT: 300 mL / NET: -300 mL        PHYSICAL EXAM:  Respiratory: CTAB, no wheezes, rales or rhonchi  Cardiovascular: S1, S2, RRR  Extremities: No cyanosis or clubbing. No peripheral edema      LABS:  08-23    133<L>  |  99  |  62<HH>  ----------------------------<  105<H>  4.9   |  17  |  1.8<H>    Ca    9.1      23 Aug 2023 09:00  Mg     2.2     08-23    TPro  6.7  /  Alb  3.4<L>  /  TBili  0.3  /  DBili  <0.2  /  AST  25  /  ALT  9   /  AlkPhos  62  08-23    Creatinine Trend: 1.8 <--, 1.5 <--, 1.7 <--, 1.6 <--, 1.5 <--, 1.6 <--, 1.2 <--, 1.6 <--, 1.2 <--, 1.0 <--, 1.2 <--, 1.4 <--                        14.2   11.10 )-----------( 323      ( 23 Aug 2023 09:00 )             44.0     Urine Studies:  Urinalysis Basic - ( 23 Aug 2023 09:00 )    Color:  / Appearance:  / SG:  / pH:   Gluc: 105 mg/dL / Ketone:   / Bili:  / Urobili:    Blood:  / Protein:  / Nitrite:    Leuk Esterase:  / RBC:  / WBC    Sq Epi:  / Non Sq Epi:  / Bacteria:

## 2023-08-23 NOTE — DISCHARGE NOTE PROVIDER - HOSPITAL COURSE
88yo F PMHx HTN, breast cancer and lymphoma s/p surgery in 2004, glaucoma, presenting for worsening dizziness and unsteadiness over the past week. Dizziness is described as feeling lightheaded, present at rest, worse whenever she bends down to get something, and in the past few days has forced her to hold onto the walls when she is walking around her house. Patient reports feeling more weakness in her legs but no numbness. Denies chest pain, vision changes, SOB, cough, congestion, ear pain, dysuria, hematuria, abdominal pain, vomiting, diarrhea. No recent sickness.     She was admitted to observation  vital signs showed high /90.   negative orthostatics  PT worked with her and recommended Rehab facility   Decision were made that she's not safe for discharge, need to be admitted for possible placement.    On the floor, workup for dizziness was done.  CT head showed: Mild progression of cerebral atrophy. Mild progression of periventricular and internal and external capsule hypodensity likely representing ischemic change, age indeterminant.  MR Head No Cont No evidence of acute infarct. Moderate chronic microvascular ischemic changes.  Neurology evaluated the patient, and conclude the dizziness was orthostatics as brain imaging is negative and the dizziness is worse with movement.    During her stay, patient c/o burning with urination, UC showed E.Coli, patient was started on Abx. Patient also tested positive for Covid with high inflammatory marker and started on redemsivir.    Patient is hemodynamically stable, Staurating good on room air, not complaining of dizziness; ready for discharge.    · Assessment    88yo F PMHx HTN, breast cancer and lymphoma s/p surgery in 2004, glaucoma, presenting for worsening dizziness and unsteadiness over the past week.      # Bilateral lower limb weakness/ with dizziness    likely due to Orthostatic Hypotension and BPPV     Acute CVA ruled out.    -  orthostatics vitals positive  >> Neg   - CT head showed: Mild progression of cerebral atrophy. Mild progression of periventricular and internal and external capsule hypodensity likely representing ischemic change, age indeterminant.  - MR Head No Cont No evidence of acute infarct. Moderate chronic microvascular ischemic changes.  - Neurology evaluated her, their impression is orthostatics as brain imaging is negative and the dizziness is worse with movement   - PT recommended Rehab facility   -  TSH, folate, B12  >>WNL   - started on meclizine 25 mg X3   - BP meds adjusted, Teds stockings     # Cystitis   - Urine cx is positive for E coli    - Macrobid 50mg bid x 5 days as per ID    # New onset Sepsis   - Fever Tmax 102.7    - blood cx ngtd    - High Inflammatory markers, CRP 59    - Started RDV X 3 DAYS as per ID :  mg iv on Day 1, then 100 mg iv D2 and D3     #HERMAN  - Creat 1.8  - Probably prerenal  - started fluids 80ml/hr  - Trend creat    # Left peroneal DVT   - Apixaban 10mg for 7days  - Apixaban 5mg      # HTN   - On home nifedipine and metoprolol ER  - continue home medications     # GERD   - continue famotidine     # DVT prophylaxis: Lovenox  # GI prophylaxis: Famotidine  # Diet: DASH/TLC  # Activity: as tolerated  # Code status: Full Code  # Disposition: rehab    Pending: sepsis/ RDV / New onset Covid   Dispo: STR    88yo F PMHx HTN, breast cancer and lymphoma s/p surgery in 2004, glaucoma, presenting for worsening dizziness and unsteadiness over the past week. Dizziness is described as feeling lightheaded, present at rest, worse whenever she bends down to get something, and in the past few days has forced her to hold onto the walls when she is walking around her house. Patient reports feeling more weakness in her legs but no numbness. Denies chest pain, vision changes, SOB, cough, congestion, ear pain, dysuria, hematuria, abdominal pain, vomiting, diarrhea. No recent sickness.     She was admitted to observation  vital signs showed high /90, negative orthostatics  PT worked with her and recommended Rehab facility   Decision were made that she's not safe for discharge, need to be admitted for possible placement.    On the floor, workup for dizziness was done.  CT head showed: Mild progression of cerebral atrophy. Mild progression of periventricular and internal and external capsule hypodensity likely representing ischemic change, age indeterminant.  MR Head No Cont No evidence of acute infarct. Moderate chronic microvascular ischemic changes.  Neurology evaluated the patient, and conclude the dizziness was orthostatics as brain imaging is negative and the dizziness is worse with movement.    During her stay, patient c/o burning with urination, UC showed E.Coli, patient was started on Abx. Patient also tested positive for Covid with high inflammatory marker and started on redemsivir.    Patient is hemodynamically stable, Staurating good on room air, not complaining of dizziness; ready for discharge.    · Assessment    88yo F PMHx HTN, breast cancer and lymphoma s/p surgery in 2004, glaucoma, presenting for worsening dizziness and unsteadiness over the past week.      # Bilateral lower limb weakness/ with dizziness    likely due to Orthostatic Hypotension and BPPV     Acute CVA ruled out.    -  orthostatics vitals positive  >> Neg   - CT head showed: Mild progression of cerebral atrophy. Mild progression of periventricular and internal and external capsule hypodensity likely representing ischemic change, age indeterminant.  - MR Head No Cont No evidence of acute infarct. Moderate chronic microvascular ischemic changes.  - Neurology evaluated her, their impression is orthostatics as brain imaging is negative and the dizziness is worse with movement   - PT recommended Rehab facility   -  TSH, folate, B12  >>WNL   - started on meclizine 25 mg X3   - BP meds adjusted, Teds stockings     # Cystitis   - Urine cx is positive for E coli    - Macrobid 50mg bid x 5 days as per ID    # New onset Sepsis   - Fever Tmax 102.7    - blood cx ngtd    - High Inflammatory markers, CRP 59    - Started RDV X 3 DAYS as per ID :  mg iv on Day 1, then 100 mg iv D2 and D3     #HERMAN  - Creat 1.8  - Probably prerenal  - started fluids 80ml/hr  - Trend creat    # Left peroneal DVT   - Apixaban 10mg for 7days  - Apixaban 5mg      # HTN   - On home nifedipine and metoprolol ER  - continue home medications     # GERD   - continue famotidine     # DVT prophylaxis: Lovenox  # GI prophylaxis: Famotidine  # Diet: DASH/TLC  # Activity: as tolerated  # Code status: Full Code  # Disposition: rehab    Pending: sepsis/ RDV / New onset Covid   Dispo: STR    88yo F PMHx HTN, breast cancer and lymphoma s/p surgery in 2004, glaucoma, presenting for worsening dizziness and unsteadiness over the past week. Dizziness is described as feeling lightheaded, present at rest, worse whenever she bends down to get something, and in the past few days has forced her to hold onto the walls when she is walking around her house. Patient reports feeling more weakness in her legs but no numbness. Denies chest pain, vision changes, SOB, cough, congestion, ear pain, dysuria, hematuria, abdominal pain, vomiting, diarrhea. No recent sickness.     She was admitted to observation  vital signs showed high /90, negative orthostatics  PT worked with her and recommended Rehab facility   Decision were made that she's not safe for discharge, need to be admitted for possible placement.    On the floor, workup for dizziness was done.  CT head showed: Mild progression of cerebral atrophy. Mild progression of periventricular and internal and external capsule hypodensity likely representing ischemic change, age indeterminant.  MR Head No Cont No evidence of acute infarct. Moderate chronic microvascular ischemic changes.  Neurology evaluated the patient, and conclude the dizziness was orthostatics as brain imaging is negative and the dizziness is worse with movement.    During her stay, patient c/o burning with urination, UC showed E.Coli, patient was started on Abx. Patient also tested positive for Covid with high inflammatory marker and started on redemsivir.  On 8/23, Orthostasis was positive: 124/58 (94) - 88/60 (119). Patient couldn't stand up. Also patient has High creatinine, started the patient on IV fluids and compression socks.    Patient is hemodynamically stable, Saturating good on room air, not complaining of dizziness; ready for discharge.    · Assessment    88yo F PMHx HTN, breast cancer and lymphoma s/p surgery in 2004, glaucoma, presenting for worsening dizziness and unsteadiness over the past week.      # Bilateral lower limb weakness/ with dizziness    likely due to Orthostatic Hypotension and BPPV     Acute CVA ruled out.    -  orthostatics vitals positive  >> Neg   - CT head showed: Mild progression of cerebral atrophy. Mild progression of periventricular and internal and external capsule hypodensity likely representing ischemic change, age indeterminant.  - MR Head No Cont No evidence of acute infarct. Moderate chronic microvascular ischemic changes.  - Neurology evaluated her, their impression is orthostatics as brain imaging is negative and the dizziness is worse with movement   - PT recommended Rehab facility   -  TSH, folate, B12  >>WNL   - started on meclizine 25 mg X3   - BP meds adjusted, Teds stockings     # Cystitis   - Urine cx is positive for E coli    - Macrobid 50mg bid x 5 days as per ID    # New onset Sepsis   - Fever Tmax 102.7    - blood cx ngtd    - High Inflammatory markers, CRP 59    - Started RDV X 3 DAYS as per ID :  mg iv on Day 1, then 100 mg iv D2 and D3     #HERMAN  - Creat 1.8  - Probably prerenal  - started fluids 80ml/hr  - Trend creat    # Left peroneal DVT   - Apixaban 10mg for 7days  - Apixaban 5mg      # HTN   - On home nifedipine and metoprolol ER  - continue home medications     # GERD   - continue famotidine     # DVT prophylaxis: Lovenox  # GI prophylaxis: Famotidine  # Diet: DASH/TLC  # Activity: as tolerated  # Code status: Full Code  # Disposition: rehab    Pending: sepsis/ RDV / New onset Covid   Dispo: STR    88yo F PMHx HTN, breast cancer and lymphoma s/p surgery in 2004, glaucoma, presenting for worsening dizziness and unsteadiness over the past week. Dizziness is described as feeling lightheaded, present at rest, worse whenever she bends down to get something, and in the past few days has forced her to hold onto the walls when she is walking around her house. Patient reports feeling more weakness in her legs but no numbness. Denies chest pain, vision changes, SOB, cough, congestion, ear pain, dysuria, hematuria, abdominal pain, vomiting, diarrhea. No recent sickness.     She was admitted to observation  vital signs showed high /90, negative orthostatics  PT worked with her and recommended Rehab facility   Decision were made that she's not safe for discharge, need to be admitted for possible placement.    On the floor, workup for dizziness was done.  CT head showed: Mild progression of cerebral atrophy. Mild progression of periventricular and internal and external capsule hypodensity likely representing ischemic change, age indeterminant.  MR Head No Cont No evidence of acute infarct. Moderate chronic microvascular ischemic changes.  Neurology evaluated the patient, and conclude the dizziness was orthostatics as brain imaging is negative and the dizziness is worse with movement.    During her stay, patient c/o burning with urination, UC showed E.Coli, patient was started on Abx. Patient also tested positive for Covid with high inflammatory marker and started on redemsivir.  On 8/23, Orthostasis was positive: 124/58 (94) - 88/60 (119). Patient couldn't stand up. Also patient has High creatinine, started the patient on IV fluids and compression socks.    Patient is hemodynamically stable, Saturating good on room air, not complaining of dizziness; ready for discharge.       # Bilateral lower limb weakness/ with dizziness    likely due to Orthostatic Hypotension and BPPV     Acute CVA ruled out.    -  orthostatics vitals positive  >> Neg   - CT head showed: Mild progression of cerebral atrophy. Mild progression of periventricular and internal and external capsule hypodensity likely representing ischemic change, age indeterminant.  - MR Head No Cont No evidence of acute infarct. Moderate chronic microvascular ischemic changes.  - Neurology evaluated her, their impression is orthostatics as brain imaging is negative and the dizziness is worse with movement   - PT recommended Rehab facility   -  TSH, folate, B12  >>WNL   - started on meclizine 25 mg X3   - BP meds adjusted, Teds stockings     # Cystitis   - Urine cx is positive for E coli    - Macrobid 50mg bid x 5 days as per ID    # New onset Sepsis   - Fever Tmax 102.7    - blood cx ngtd    - High Inflammatory markers, CRP 59    - Started RDV X 3 DAYS as per ID :  mg iv on Day 1, then 100 mg iv D2 and D3     #HERMAN  - Creat 1.8  - Probably prerenal  - started fluids 80ml/hr  - Trend creat    # Left peroneal DVT   - Apixaban 10mg for 7days  - Apixaban 5mg      # HTN   - On home nifedipine and metoprolol ER  - continue home medications     # GERD   - continue famotidine

## 2023-08-24 LAB
ALBUMIN SERPL ELPH-MCNC: 1.8 G/DL — LOW (ref 3.5–5.2)
ALBUMIN SERPL ELPH-MCNC: 1.9 G/DL — LOW (ref 3.5–5.2)
ALP SERPL-CCNC: 34 U/L — SIGNIFICANT CHANGE UP (ref 30–115)
ALP SERPL-CCNC: 37 U/L — SIGNIFICANT CHANGE UP (ref 30–115)
ALT FLD-CCNC: <5 U/L — SIGNIFICANT CHANGE UP (ref 0–41)
ALT FLD-CCNC: <5 U/L — SIGNIFICANT CHANGE UP (ref 0–41)
ANION GAP SERPL CALC-SCNC: 11 MMOL/L — SIGNIFICANT CHANGE UP (ref 7–14)
AST SERPL-CCNC: 14 U/L — SIGNIFICANT CHANGE UP (ref 0–41)
AST SERPL-CCNC: 15 U/L — SIGNIFICANT CHANGE UP (ref 0–41)
BASOPHILS # BLD AUTO: 0.02 K/UL — SIGNIFICANT CHANGE UP (ref 0–0.2)
BASOPHILS NFR BLD AUTO: 0.3 % — SIGNIFICANT CHANGE UP (ref 0–1)
BILIRUB DIRECT SERPL-MCNC: <0.2 MG/DL — SIGNIFICANT CHANGE UP (ref 0–0.3)
BILIRUB INDIRECT FLD-MCNC: SIGNIFICANT CHANGE UP MG/DL (ref 0.2–1.2)
BILIRUB SERPL-MCNC: <0.2 MG/DL — SIGNIFICANT CHANGE UP (ref 0.2–1.2)
BILIRUB SERPL-MCNC: <0.2 MG/DL — SIGNIFICANT CHANGE UP (ref 0.2–1.2)
BUN SERPL-MCNC: 51 MG/DL — HIGH (ref 10–20)
CALCIUM SERPL-MCNC: 5.5 MG/DL — CRITICAL LOW (ref 8.4–10.5)
CHLORIDE SERPL-SCNC: 112 MMOL/L — HIGH (ref 98–110)
CO2 SERPL-SCNC: 11 MMOL/L — LOW (ref 17–32)
CREAT SERPL-MCNC: 1.1 MG/DL — SIGNIFICANT CHANGE UP (ref 0.7–1.5)
CULTURE RESULTS: SIGNIFICANT CHANGE UP
EGFR: 49 ML/MIN/1.73M2 — LOW
EOSINOPHIL # BLD AUTO: 0.06 K/UL — SIGNIFICANT CHANGE UP (ref 0–0.7)
EOSINOPHIL NFR BLD AUTO: 0.8 % — SIGNIFICANT CHANGE UP (ref 0–8)
GLUCOSE SERPL-MCNC: 76 MG/DL — SIGNIFICANT CHANGE UP (ref 70–99)
HCT VFR BLD CALC: 34.6 % — LOW (ref 37–47)
HGB BLD-MCNC: 11.3 G/DL — LOW (ref 12–16)
IMM GRANULOCYTES NFR BLD AUTO: 0.8 % — HIGH (ref 0.1–0.3)
LYMPHOCYTES # BLD AUTO: 1.14 K/UL — LOW (ref 1.2–3.4)
LYMPHOCYTES # BLD AUTO: 15.7 % — LOW (ref 20.5–51.1)
MAGNESIUM SERPL-MCNC: 1.4 MG/DL — LOW (ref 1.8–2.4)
MCHC RBC-ENTMCNC: 28.4 PG — SIGNIFICANT CHANGE UP (ref 27–31)
MCHC RBC-ENTMCNC: 32.7 G/DL — SIGNIFICANT CHANGE UP (ref 32–37)
MCV RBC AUTO: 86.9 FL — SIGNIFICANT CHANGE UP (ref 81–99)
MONOCYTES # BLD AUTO: 1.01 K/UL — HIGH (ref 0.1–0.6)
MONOCYTES NFR BLD AUTO: 13.9 % — HIGH (ref 1.7–9.3)
NEUTROPHILS # BLD AUTO: 4.96 K/UL — SIGNIFICANT CHANGE UP (ref 1.4–6.5)
NEUTROPHILS NFR BLD AUTO: 68.5 % — SIGNIFICANT CHANGE UP (ref 42.2–75.2)
NRBC # BLD: 0 /100 WBCS — SIGNIFICANT CHANGE UP (ref 0–0)
PLATELET # BLD AUTO: 316 K/UL — SIGNIFICANT CHANGE UP (ref 130–400)
PMV BLD: 10.8 FL — HIGH (ref 7.4–10.4)
POTASSIUM SERPL-MCNC: 3.1 MMOL/L — LOW (ref 3.5–5)
POTASSIUM SERPL-SCNC: 3.1 MMOL/L — LOW (ref 3.5–5)
PROT SERPL-MCNC: 3.7 G/DL — LOW (ref 6–8)
PROT SERPL-MCNC: 4 G/DL — LOW (ref 6–8)
RBC # BLD: 3.98 M/UL — LOW (ref 4.2–5.4)
RBC # FLD: 14.1 % — SIGNIFICANT CHANGE UP (ref 11.5–14.5)
SODIUM SERPL-SCNC: 134 MMOL/L — LOW (ref 135–146)
SPECIMEN SOURCE: SIGNIFICANT CHANGE UP
WBC # BLD: 7.25 K/UL — SIGNIFICANT CHANGE UP (ref 4.8–10.8)
WBC # FLD AUTO: 7.25 K/UL — SIGNIFICANT CHANGE UP (ref 4.8–10.8)

## 2023-08-24 PROCEDURE — 73560 X-RAY EXAM OF KNEE 1 OR 2: CPT | Mod: 26,50

## 2023-08-24 PROCEDURE — 74018 RADEX ABDOMEN 1 VIEW: CPT | Mod: 26

## 2023-08-24 PROCEDURE — 93010 ELECTROCARDIOGRAM REPORT: CPT

## 2023-08-24 PROCEDURE — 99232 SBSQ HOSP IP/OBS MODERATE 35: CPT

## 2023-08-24 RX ORDER — METOCLOPRAMIDE HCL 10 MG
5 TABLET ORAL EVERY 8 HOURS
Refills: 0 | Status: DISCONTINUED | OUTPATIENT
Start: 2023-08-24 | End: 2023-08-24

## 2023-08-24 RX ORDER — LACTULOSE 10 G/15ML
20 SOLUTION ORAL
Refills: 0 | Status: COMPLETED | OUTPATIENT
Start: 2023-08-24 | End: 2023-08-24

## 2023-08-24 RX ORDER — MECLIZINE HCL 12.5 MG
12.5 TABLET ORAL
Refills: 0 | Status: DISCONTINUED | OUTPATIENT
Start: 2023-08-24 | End: 2023-08-29

## 2023-08-24 RX ORDER — POTASSIUM CHLORIDE 20 MEQ
40 PACKET (EA) ORAL EVERY 4 HOURS
Refills: 0 | Status: COMPLETED | OUTPATIENT
Start: 2023-08-24 | End: 2023-08-24

## 2023-08-24 RX ORDER — SODIUM CHLORIDE 9 MG/ML
500 INJECTION INTRAMUSCULAR; INTRAVENOUS; SUBCUTANEOUS ONCE
Refills: 0 | Status: COMPLETED | OUTPATIENT
Start: 2023-08-24 | End: 2023-08-24

## 2023-08-24 RX ORDER — POLYETHYLENE GLYCOL 3350 17 G/17G
17 POWDER, FOR SOLUTION ORAL
Refills: 0 | Status: DISCONTINUED | OUTPATIENT
Start: 2023-08-24 | End: 2023-08-29

## 2023-08-24 RX ORDER — CALCIUM GLUCONATE 100 MG/ML
1 VIAL (ML) INTRAVENOUS ONCE
Refills: 0 | Status: COMPLETED | OUTPATIENT
Start: 2023-08-24 | End: 2023-08-24

## 2023-08-24 RX ORDER — SENNA PLUS 8.6 MG/1
2 TABLET ORAL AT BEDTIME
Refills: 0 | Status: DISCONTINUED | OUTPATIENT
Start: 2023-08-24 | End: 2023-08-29

## 2023-08-24 RX ORDER — SODIUM BICARBONATE 1 MEQ/ML
1300 SYRINGE (ML) INTRAVENOUS
Refills: 0 | Status: DISCONTINUED | OUTPATIENT
Start: 2023-08-24 | End: 2023-08-26

## 2023-08-24 RX ORDER — MAGNESIUM SULFATE 500 MG/ML
2 VIAL (ML) INJECTION EVERY 4 HOURS
Refills: 0 | Status: COMPLETED | OUTPATIENT
Start: 2023-08-24 | End: 2023-08-24

## 2023-08-24 RX ADMIN — Medication 40 MILLIEQUIVALENT(S): at 14:37

## 2023-08-24 RX ADMIN — SODIUM CHLORIDE 500 MILLILITER(S): 9 INJECTION INTRAMUSCULAR; INTRAVENOUS; SUBCUTANEOUS at 16:48

## 2023-08-24 RX ADMIN — LACTULOSE 20 GRAM(S): 10 SOLUTION ORAL at 16:49

## 2023-08-24 RX ADMIN — Medication 650 MILLIGRAM(S): at 05:19

## 2023-08-24 RX ADMIN — Medication 25 MILLIGRAM(S): at 13:05

## 2023-08-24 RX ADMIN — Medication 325 MILLIGRAM(S): at 05:09

## 2023-08-24 RX ADMIN — Medication 25 MILLIGRAM(S): at 05:10

## 2023-08-24 RX ADMIN — SENNA PLUS 2 TABLET(S): 8.6 TABLET ORAL at 21:16

## 2023-08-24 RX ADMIN — APIXABAN 10 MILLIGRAM(S): 2.5 TABLET, FILM COATED ORAL at 17:31

## 2023-08-24 RX ADMIN — Medication 5 MILLIGRAM(S): at 09:25

## 2023-08-24 RX ADMIN — Medication 40 MILLIEQUIVALENT(S): at 17:35

## 2023-08-24 RX ADMIN — REMDESIVIR 200 MILLIGRAM(S): 5 INJECTION INTRAVENOUS at 15:05

## 2023-08-24 RX ADMIN — LACTULOSE 20 GRAM(S): 10 SOLUTION ORAL at 17:36

## 2023-08-24 RX ADMIN — CHLORHEXIDINE GLUCONATE 1 APPLICATION(S): 213 SOLUTION TOPICAL at 05:11

## 2023-08-24 RX ADMIN — FAMOTIDINE 20 MILLIGRAM(S): 10 INJECTION INTRAVENOUS at 12:25

## 2023-08-24 RX ADMIN — Medication 100 MILLIGRAM(S): at 05:08

## 2023-08-24 RX ADMIN — Medication 100 GRAM(S): at 16:50

## 2023-08-24 RX ADMIN — Medication 25 MILLIGRAM(S): at 05:09

## 2023-08-24 RX ADMIN — Medication 650 MILLIGRAM(S): at 05:55

## 2023-08-24 RX ADMIN — Medication 30 MILLIGRAM(S): at 05:09

## 2023-08-24 RX ADMIN — Medication 1300 MILLIGRAM(S): at 13:05

## 2023-08-24 RX ADMIN — Medication 25 GRAM(S): at 14:37

## 2023-08-24 RX ADMIN — APIXABAN 10 MILLIGRAM(S): 2.5 TABLET, FILM COATED ORAL at 05:09

## 2023-08-24 RX ADMIN — SODIUM CHLORIDE 80 MILLILITER(S): 9 INJECTION INTRAMUSCULAR; INTRAVENOUS; SUBCUTANEOUS at 05:18

## 2023-08-24 RX ADMIN — POLYETHYLENE GLYCOL 3350 17 GRAM(S): 17 POWDER, FOR SOLUTION ORAL at 17:36

## 2023-08-24 RX ADMIN — Medication 25 GRAM(S): at 17:36

## 2023-08-24 NOTE — PROGRESS NOTE ADULT - ASSESSMENT
86 yo F PMHx HTN, breast cancer and lymphoma s/p surgery in 2004, glaucoma, presenting for worsening dizziness and unsteadiness over the past week. COVID +ve on 8/20. 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

## 2023-08-24 NOTE — PROGRESS NOTE ADULT - ASSESSMENT
Assessment    86yo F PMHx HTN, breast cancer and lymphoma s/p surgery in 2004, glaucoma, presenting for worsening dizziness and unsteadiness over the past week.      # abd pain, mild tenderness, nausea, with constipation  - check KUB  - if neg, consider ct abd   - FU BMP    # Bilateral lower limb weakness/ with dizziness    likely due to Orthostatic Hypotension and BPPV     Acute CVA ruled out.    -  orthostatics vitals positive  >> Neg   - CT head showed: Mild progression of cerebral atrophy. Mild progression of periventricular and internal and external capsule hypodensity likely representing ischemic change, age indeterminant.  - MR Head No Cont No evidence of acute infarct. Moderate chronic microvascular ischemic changes.  - Neurology evaluated her, their impression is orthostatics as brain imaging is negative and the dizziness is worse with movement   - PT recommended Rehab facility   -  TSH, folate, B12  >>WNL   - started on meclizine 25 mg X3   - BP meds adjusted, Teds stockings     # Cystitis   - Urine cx is positive for E coli    - Macrobid 50mg bid x 5 days as per ID    # New onset Sepsis   - Fever Tmax 102.7    - blood cx ngtd    - High Inflammatory markers, CRP 59    - Started RDV X 3 DAYS as per ID :  mg iv on Day 1, then 100 mg iv D2 and D3     #HERMAN  - Creat 1.8  - Probably prerenal  - started fluids 80ml/hr  - Trend creat    # Left peroneal DVT   - Apixaban 10mg for 7days  - Apixaban 5mg      # HTN   - On home nifedipine and metoprolol ER  - continue home medications     # GERD   - continue famotidine     # DVT prophylaxis: Lovenox  # GI prophylaxis: Famotidine  # Diet: DASH/TLC  # Activity: as tolerated  # Code status: Full Code  # Disposition: rehab    Pending: sepsis/ RDV / New onset Covid   Dispo: STR

## 2023-08-24 NOTE — PROGRESS NOTE ADULT - SUBJECTIVE AND OBJECTIVE BOX
pt seen and examined.     Resident's notes reviewed, My notes supersede resident's notes in case of discrepancy       ROS: no cp, no sob, no fever, has abd discomfort and nausea, no vomiting     Vital Signs Last 24 Hrs  T(C): 37.4 (24 Aug 2023 05:00), Max: 37.4 (24 Aug 2023 05:00)  T(F): 99.4 (24 Aug 2023 05:00), Max: 99.4 (24 Aug 2023 05:00)  HR: 87 (24 Aug 2023 05:00) (87 - 93)  BP: 129/62 (24 Aug 2023 05:00) (120/58 - 129/62)  BP(mean): --  RR: 18 (24 Aug 2023 05:00) (18 - 18)  SpO2: 96% (24 Aug 2023 05:00) (96% - 96%)    Parameters below as of 24 Aug 2023 05:00  Patient On (Oxygen Delivery Method): room air    physical exam  constitutional NAD, AAOX3, Respiratory  lungs CTA, CVS heart RRR, GI: abdomen mildly tender, ND, BS+, skin: intact  neuro exam Motor, sensory and CN normal, no deficit     MEDICATIONS  (STANDING):  apixaban 10 milliGRAM(s) Oral every 12 hours  budesonide 160 MICROgram(s)/formoterol 4.5 MICROgram(s) Inhaler 2 Puff(s) Inhalation two times a day  chlorhexidine 2% Cloths 1 Application(s) Topical <User Schedule>  famotidine    Tablet 20 milliGRAM(s) Oral daily  meclizine 25 milliGRAM(s) Oral three times a day  metoprolol succinate ER 25 milliGRAM(s) Oral daily  NIFEdipine XL 30 milliGRAM(s) Oral daily  nitrofurantoin monohydrate/macrocrystals (MACROBID) 100 milliGRAM(s) Oral <User Schedule>  remdesivir  IVPB   IV Intermittent   remdesivir  IVPB 100 milliGRAM(s) IV Intermittent every 24 hours  sodium bicarbonate 1300 milliGRAM(s) Oral two times a day    MEDICATIONS  (PRN):  acetaminophen     Tablet .. 650 milliGRAM(s) Oral every 6 hours PRN Temp greater or equal to 38C (100.4F)  guaifenesin/dextromethorphan Oral Liquid 10 milliLiter(s) Oral every 4 hours PRN Cough  metoclopramide Injectable 5 milliGRAM(s) IV Push every 8 hours PRN nausea                        11.3   7.25  )-----------( 316      ( 24 Aug 2023 08:40 )             34.6     08-24    134<L>  |  112<H>  |  51<H>  ----------------------------<  76  3.1<L>   |  11<L>  |  1.1    Ca    5.5<LL>      24 Aug 2023 08:40  Mg     1.4     08-24    TPro  4.0<L>  /  Alb  1.9<L>  /  TBili  <0.2  /  DBili  <0.2  /  AST  14  /  ALT  <5  /  AlkPhos  37  08-24    D-Dimer Assay, Quantitative: 479 ng/mL DDU (08-21-23 @ 11:54)  Ferritin: 313 ng/mL [13 - 330] (08-21-23 @ 11:54)  Procalcitonin, Serum: 0.18 ng/mL [0.02 - 0.10] (08-21-23 @ 11:54)    a/p    86yo F PMHx HTN, breast cancer and lymphoma s/p surgery in 2004, glaucoma, presenting for worsening dizziness and unsteadiness over the past week. Dizziness is described as feeling lightheaded, present at rest, worse whenever she bends down to get something, and in the past few days has forced her to hold onto the walls when she is walking around her house. Patient reports feeling more weakness in her legs but no numbness.                                pt seen and examined.     Resident's notes reviewed, My notes supersede resident's notes in case of discrepancy       ROS: no cp, no sob, no fever, has abd discomfort and nausea, no vomiting     Vital Signs Last 24 Hrs  T(C): 37.4 (24 Aug 2023 05:00), Max: 37.4 (24 Aug 2023 05:00)  T(F): 99.4 (24 Aug 2023 05:00), Max: 99.4 (24 Aug 2023 05:00)  HR: 87 (24 Aug 2023 05:00) (87 - 93)  BP: 129/62 (24 Aug 2023 05:00) (120/58 - 129/62)  BP(mean): --  RR: 18 (24 Aug 2023 05:00) (18 - 18)  SpO2: 96% (24 Aug 2023 05:00) (96% - 96%)    Parameters below as of 24 Aug 2023 05:00  Patient On (Oxygen Delivery Method): room air    physical exam  constitutional NAD, AAOX3, Respiratory  lungs CTA, CVS heart RRR, GI: abdomen mildly tender, ND, BS+, skin: intact  neuro exam Motor, sensory and CN normal, no deficit     MEDICATIONS  (STANDING):  apixaban 10 milliGRAM(s) Oral every 12 hours  budesonide 160 MICROgram(s)/formoterol 4.5 MICROgram(s) Inhaler 2 Puff(s) Inhalation two times a day  chlorhexidine 2% Cloths 1 Application(s) Topical <User Schedule>  famotidine    Tablet 20 milliGRAM(s) Oral daily  meclizine 25 milliGRAM(s) Oral three times a day  metoprolol succinate ER 25 milliGRAM(s) Oral daily  NIFEdipine XL 30 milliGRAM(s) Oral daily  nitrofurantoin monohydrate/macrocrystals (MACROBID) 100 milliGRAM(s) Oral <User Schedule>  remdesivir  IVPB   IV Intermittent   remdesivir  IVPB 100 milliGRAM(s) IV Intermittent every 24 hours  sodium bicarbonate 1300 milliGRAM(s) Oral two times a day    MEDICATIONS  (PRN):  acetaminophen     Tablet .. 650 milliGRAM(s) Oral every 6 hours PRN Temp greater or equal to 38C (100.4F)  guaifenesin/dextromethorphan Oral Liquid 10 milliLiter(s) Oral every 4 hours PRN Cough  metoclopramide Injectable 5 milliGRAM(s) IV Push every 8 hours PRN nausea                        11.3   7.25  )-----------( 316      ( 24 Aug 2023 08:40 )             34.6     08-24    134<L>  |  112<H>  |  51<H>  ----------------------------<  76  3.1<L>   |  11<L>  |  1.1    Ca    5.5<LL>      24 Aug 2023 08:40  Mg     1.4     08-24    TPro  4.0<L>  /  Alb  1.9<L>  /  TBili  <0.2  /  DBili  <0.2  /  AST  14  /  ALT  <5  /  AlkPhos  37  08-24    D-Dimer Assay, Quantitative: 479 ng/mL DDU (08-21-23 @ 11:54)  Ferritin: 313 ng/mL [13 - 330] (08-21-23 @ 11:54)  Procalcitonin, Serum: 0.18 ng/mL [0.02 - 0.10] (08-21-23 @ 11:54)    a/p    86yo F PMHx HTN, breast cancer and lymphoma s/p surgery in 2004, glaucoma, presenting for worsening dizziness and unsteadiness over the past week. Dizziness is described as feeling lightheaded, present at rest, worse whenever she bends down to get something, and in the past few days has forced her to hold onto the walls when she is walking around her house. Patient reports feeling more weakness in her legs but no numbness. pt has dizziness.   today she has abd pain    # abd pain, mild tenderness, nausea, with constipation,    check KUB  if neg, consider ct abd     # dizziness. no vertigo, no nystagmus , positive orthostatic hypotension,   Orthostatic VS    08-23-23 @ 14:03  Lying BP: Orthostatic BP (Lying Systolic): 124/Orthostatic BP (Lying Diastolic (mm Hg)): 58 HR: Orthostatic Pulse (Heart Rate (beats/min)): 94   Sitting BP: Orthostatic BP (Sitting Systolic): 88/Orthostatic BP (Sitting Diastolic (mm Hg)): 60 HR: Orthostatic Pulse (Heart Rate (beats/min)): 119    sp ivf, repeat orthostatic  possible due to dehydration   pt eval      check echo     # covid infection  not hypoxic  ID notes appreciated  finished 3 days of remdisivir   - mg iv on Day 1, then 100 mg iv D2 and D3  today is day # 3     # hx of lymphoma and breast ca, sp surgery cont outpt followup     # hypertension controlled,     # chavo with HAGMA and mild hyponatremia , possible cystitis   today chavo improved with ivf but still has acidosis,   cont bicarb   creatinine trend  1.1 (08-24-23 @ 08:40)  1.8 (08-23-23 @ 09:00)  1.5 (08-22-23 @ 18:34)  1.7 (08-22-23 @ 11:36)  1.6 (08-22-23 @ 06:52)  1.5 (08-21-23 @ 11:54)  1.6 (08-21-23 @ 06:40)  1.2 (08-20-23 @ 07:43)    # hypokalemia, and hypomagnesemia, ( mag def)   prolonged QTc   replace mag  repeat ecg     # anemia of chronic disease, b12, folate, and ferritin wnl     Hemoglobin: 11.3 g/dL (08-24-23 @ 08:40)  Hemoglobin: 14.2 g/dL (08-23-23 @ 09:00)  Hemoglobin: 11.8 g/dL (08-22-23 @ 06:52)  Hemoglobin: 13.1 g/dL (08-21-23 @ 06:40)      #Progress Note Handoff  Pending : repeat ecg, repeat orthostatic bp and HR , KUB   Family discussion: chantale pt   Disposition: STR   time spent : 35 min                            pt seen and examined.     Resident's notes reviewed, My notes supersede resident's notes in case of discrepancy       ROS: no cp, no sob, no fever, has abd discomfort and nausea, no vomiting     Vital Signs Last 24 Hrs  T(C): 37.4 (24 Aug 2023 05:00), Max: 37.4 (24 Aug 2023 05:00)  T(F): 99.4 (24 Aug 2023 05:00), Max: 99.4 (24 Aug 2023 05:00)  HR: 87 (24 Aug 2023 05:00) (87 - 93)  BP: 129/62 (24 Aug 2023 05:00) (120/58 - 129/62)  BP(mean): --  RR: 18 (24 Aug 2023 05:00) (18 - 18)  SpO2: 96% (24 Aug 2023 05:00) (96% - 96%)    Parameters below as of 24 Aug 2023 05:00  Patient On (Oxygen Delivery Method): room air    physical exam  constitutional NAD, AAOX3, Respiratory  lungs CTA, CVS heart RRR, GI: abdomen mildly tender, ND, BS+, skin: intact  neuro exam Motor, sensory and CN normal, no deficit     MEDICATIONS  (STANDING):  apixaban 10 milliGRAM(s) Oral every 12 hours  budesonide 160 MICROgram(s)/formoterol 4.5 MICROgram(s) Inhaler 2 Puff(s) Inhalation two times a day  chlorhexidine 2% Cloths 1 Application(s) Topical <User Schedule>  famotidine    Tablet 20 milliGRAM(s) Oral daily  meclizine 25 milliGRAM(s) Oral three times a day  metoprolol succinate ER 25 milliGRAM(s) Oral daily  NIFEdipine XL 30 milliGRAM(s) Oral daily  nitrofurantoin monohydrate/macrocrystals (MACROBID) 100 milliGRAM(s) Oral <User Schedule>  remdesivir  IVPB   IV Intermittent   remdesivir  IVPB 100 milliGRAM(s) IV Intermittent every 24 hours  sodium bicarbonate 1300 milliGRAM(s) Oral two times a day    MEDICATIONS  (PRN):  acetaminophen     Tablet .. 650 milliGRAM(s) Oral every 6 hours PRN Temp greater or equal to 38C (100.4F)  guaifenesin/dextromethorphan Oral Liquid 10 milliLiter(s) Oral every 4 hours PRN Cough  metoclopramide Injectable 5 milliGRAM(s) IV Push every 8 hours PRN nausea                        11.3   7.25  )-----------( 316      ( 24 Aug 2023 08:40 )             34.6     08-24    134<L>  |  112<H>  |  51<H>  ----------------------------<  76  3.1<L>   |  11<L>  |  1.1    Ca    5.5<LL>      24 Aug 2023 08:40  Mg     1.4     08-24    TPro  4.0<L>  /  Alb  1.9<L>  /  TBili  <0.2  /  DBili  <0.2  /  AST  14  /  ALT  <5  /  AlkPhos  37  08-24    D-Dimer Assay, Quantitative: 479 ng/mL DDU (08-21-23 @ 11:54)  Ferritin: 313 ng/mL [13 - 330] (08-21-23 @ 11:54)  Procalcitonin, Serum: 0.18 ng/mL [0.02 - 0.10] (08-21-23 @ 11:54)    a/p    86yo F PMHx HTN, breast cancer and lymphoma s/p surgery in 2004, glaucoma, presenting for worsening dizziness and unsteadiness over the past week. Dizziness is described as feeling lightheaded, present at rest, worse whenever she bends down to get something, and in the past few days has forced her to hold onto the walls when she is walking around her house. Patient reports feeling more weakness in her legs but no numbness. pt has dizziness.   today she has abd pain    # abd pain, mild tenderness, nausea, with constipation,    check KUB  if neg, consider ct abd     # dizziness. no vertigo, no nystagmus , positive orthostatic hypotension,   Orthostatic VS    08-23-23 @ 14:03  Lying BP: Orthostatic BP (Lying Systolic): 124/Orthostatic BP (Lying Diastolic (mm Hg)): 58 HR: Orthostatic Pulse (Heart Rate (beats/min)): 94   Sitting BP: Orthostatic BP (Sitting Systolic): 88/Orthostatic BP (Sitting Diastolic (mm Hg)): 60 HR: Orthostatic Pulse (Heart Rate (beats/min)): 119    sp ivf, repeat orthostatic  possible due to dehydration   pt eval      check echo     # covid infection  not hypoxic  ID notes appreciated  finished 3 days of remdisivir   - mg iv on Day 1, then 100 mg iv D2 and D3  today is day # 3     # hx of lymphoma and breast ca, sp surgery cont outpt followup     # hypertension controlled,     # chavo with HAGMA and mild hyponatremia , possible cystitis   today chavo improved with ivf but still has acidosis,   cont bicarb   creatinine trend  1.1 (08-24-23 @ 08:40)  1.8 (08-23-23 @ 09:00)  1.5 (08-22-23 @ 18:34)  1.7 (08-22-23 @ 11:36)  1.6 (08-22-23 @ 06:52)  1.5 (08-21-23 @ 11:54)  1.6 (08-21-23 @ 06:40)  1.2 (08-20-23 @ 07:43)    # hypokalemia, and hypomagnesemia, ( mag def)   replace mag  check ecg     # anemia of chronic disease, b12, folate, and ferritin wnl     Hemoglobin: 11.3 g/dL (08-24-23 @ 08:40)  Hemoglobin: 14.2 g/dL (08-23-23 @ 09:00)  Hemoglobin: 11.8 g/dL (08-22-23 @ 06:52)  Hemoglobin: 13.1 g/dL (08-21-23 @ 06:40)      #Progress Note Handoff  Pending : ecg, repeat orthostatic bp and HR , KUB   Family discussion: chantale pt   Disposition: STR   time spent : 35 min

## 2023-08-24 NOTE — PROGRESS NOTE ADULT - SUBJECTIVE AND OBJECTIVE BOX
SUBJECTIVE:    Patient is a 87y old Female who presents with a chief complaint of Dizziness (24 Aug 2023 13:45)    Today is hospital day 9d. This morning patient is complaining of mild abdominal discomfort, no diarrhea, no vomiting.    PAST MEDICAL & SURGICAL HISTORY  Prolapsed uterus    Hernia    HTN (hypertension)    GERD (gastroesophageal reflux disease)    Breast CA    No significant past surgical history        ALLERGIES:  Bactrim (Hives)  penicillin (Short breath; Hives)    MEDICATIONS:  STANDING MEDICATIONS  apixaban 10 milliGRAM(s) Oral every 12 hours  budesonide 160 MICROgram(s)/formoterol 4.5 MICROgram(s) Inhaler 2 Puff(s) Inhalation two times a day  calcium gluconate IVPB 1 Gram(s) IV Intermittent once  chlorhexidine 2% Cloths 1 Application(s) Topical <User Schedule>  famotidine    Tablet 20 milliGRAM(s) Oral daily  magnesium sulfate  IVPB 2 Gram(s) IV Intermittent every 4 hours  metoprolol succinate ER 25 milliGRAM(s) Oral daily  NIFEdipine XL 30 milliGRAM(s) Oral daily  nitrofurantoin monohydrate/macrocrystals (MACROBID) 100 milliGRAM(s) Oral <User Schedule>  potassium chloride    Tablet ER 40 milliEquivalent(s) Oral every 4 hours  sodium bicarbonate 1300 milliGRAM(s) Oral two times a day  sodium chloride 0.9% Bolus 500 milliLiter(s) IV Bolus once    PRN MEDICATIONS  acetaminophen     Tablet .. 650 milliGRAM(s) Oral every 6 hours PRN  guaifenesin/dextromethorphan Oral Liquid 10 milliLiter(s) Oral every 4 hours PRN  meclizine 12.5 milliGRAM(s) Oral two times a day PRN    VITALS:   T(F): 96  HR: 93  BP: 115/62  RR: 20  SpO2: 98%    LABS:                        11.3   7.25  )-----------( 316      ( 24 Aug 2023 08:40 )             34.6     08-24    134<L>  |  112<H>  |  51<H>  ----------------------------<  76  3.1<L>   |  11<L>  |  1.1    Ca    5.5<LL>      24 Aug 2023 08:40  Mg     1.4     08-24    TPro  4.0<L>  /  Alb  1.9<L>  /  TBili  <0.2  /  DBili  <0.2  /  AST  14  /  ALT  <5  /  AlkPhos  37  08-24      Urinalysis Basic - ( 24 Aug 2023 08:40 )    Color: x / Appearance: x / SG: x / pH: x  Gluc: 76 mg/dL / Ketone: x  / Bili: x / Urobili: x   Blood: x / Protein: x / Nitrite: x   Leuk Esterase: x / RBC: x / WBC x   Sq Epi: x / Non Sq Epi: x / Bacteria: x                RADIOLOGY:    PHYSICAL EXAM:  GEN: No acute distress  PULM/CHEST: Clear to auscultation bilaterally, no rales, rhonchi or wheezes   CVS: Regular rate and rhythm, S1-S2, no murmurs  ABD: Soft, non-tender, non-distended, +BS  EXT: No edema  NEURO: AAOx3    Ibrahim Catheter:

## 2023-08-24 NOTE — PROGRESS NOTE ADULT - SUBJECTIVE AND OBJECTIVE BOX
24 h events noted         PAST HISTORY  --------------------------------------------------------------------------------  No significant changes to PMH, PSH, FHx, SHx, unless otherwise noted    ALLERGIES & MEDICATIONS  --------------------------------------------------------------------------------  Allergies    Bactrim (Hives)  penicillin (Short breath; Hives)    Intolerances    Percocet 5/325 (Other)    Standing Inpatient Medications  apixaban 10 milliGRAM(s) Oral every 12 hours  budesonide 160 MICROgram(s)/formoterol 4.5 MICROgram(s) Inhaler 2 Puff(s) Inhalation two times a day  chlorhexidine 2% Cloths 1 Application(s) Topical <User Schedule>  famotidine    Tablet 20 milliGRAM(s) Oral daily  meclizine 25 milliGRAM(s) Oral three times a day  metoprolol succinate ER 25 milliGRAM(s) Oral daily  NIFEdipine XL 30 milliGRAM(s) Oral daily  nitrofurantoin monohydrate/macrocrystals (MACROBID) 100 milliGRAM(s) Oral <User Schedule>  remdesivir  IVPB   IV Intermittent   remdesivir  IVPB 100 milliGRAM(s) IV Intermittent every 24 hours  sodium bicarbonate 325 milliGRAM(s) Oral three times a day  sodium chloride 0.9%. 1000 milliLiter(s) IV Continuous <Continuous>    PRN Inpatient Medications  acetaminophen     Tablet .. 650 milliGRAM(s) Oral every 6 hours PRN  guaifenesin/dextromethorphan Oral Liquid 10 milliLiter(s) Oral every 4 hours PRN          VITALS/PHYSICAL EXAM  --------------------------------------------------------------------------------  T(C): 37.4 (08-24-23 @ 05:00), Max: 37.4 (08-24-23 @ 05:00)  HR: 87 (08-24-23 @ 05:00) (87 - 93)  BP: 129/62 (08-24-23 @ 05:00) (120/58 - 133/68)  RR: 18 (08-24-23 @ 05:00) (18 - 19)  SpO2: 96% (08-24-23 @ 05:00) (96% - 96%)  Wt(kg): --          LABS/STUDIES  --------------------------------------------------------------------------------              14.2   11.10 >-----------<  323      [08-23-23 @ 09:00]              44.0     133  |  99  |  62  ----------------------------<  105      [08-23-23 @ 09:00]  4.9   |  17  |  1.8        Ca     9.1     [08-23-23 @ 09:00]      Mg     2.2     [08-23-23 @ 09:00]    TPro  6.7  /  Alb  3.4  /  TBili  0.3  /  DBili  <0.2  /  AST  25  /  ALT  9   /  AlkPhos  62  [08-23-23 @ 09:00]        Creatinine Trend:  SCr 1.8 [08-23 @ 09:00]  SCr 1.5 [08-22 @ 18:34]  SCr 1.7 [08-22 @ 11:36]  SCr 1.6 [08-22 @ 06:52]  SCr 1.5 [08-21 @ 11:54]    Urinalysis - [08-23-23 @ 09:00]      Color  / Appearance  / SG  / pH       Gluc 105 / Ketone   / Bili  / Urobili        Blood  / Protein  / Leuk Est  / Nitrite       RBC  / WBC  / Hyaline  / Gran  / Sq Epi  / Non Sq Epi  / Bacteria       Ferritin 313      [08-21-23 @ 11:54]

## 2023-08-25 LAB
ALBUMIN SERPL ELPH-MCNC: 3.3 G/DL — LOW (ref 3.5–5.2)
ALP SERPL-CCNC: 61 U/L — SIGNIFICANT CHANGE UP (ref 30–115)
ALT FLD-CCNC: 7 U/L — SIGNIFICANT CHANGE UP (ref 0–41)
ANION GAP SERPL CALC-SCNC: 16 MMOL/L — HIGH (ref 7–14)
AST SERPL-CCNC: 19 U/L — SIGNIFICANT CHANGE UP (ref 0–41)
BILIRUB SERPL-MCNC: 0.3 MG/DL — SIGNIFICANT CHANGE UP (ref 0.2–1.2)
BUN SERPL-MCNC: 77 MG/DL — CRITICAL HIGH (ref 10–20)
CALCIUM SERPL-MCNC: 9.5 MG/DL — SIGNIFICANT CHANGE UP (ref 8.4–10.5)
CHLORIDE SERPL-SCNC: 100 MMOL/L — SIGNIFICANT CHANGE UP (ref 98–110)
CO2 SERPL-SCNC: 14 MMOL/L — LOW (ref 17–32)
CREAT SERPL-MCNC: 1.9 MG/DL — HIGH (ref 0.7–1.5)
EGFR: 26 ML/MIN/1.73M2 — LOW
GLUCOSE SERPL-MCNC: 119 MG/DL — HIGH (ref 70–99)
HCT VFR BLD CALC: 44.1 % — SIGNIFICANT CHANGE UP (ref 37–47)
HGB BLD-MCNC: 14.2 G/DL — SIGNIFICANT CHANGE UP (ref 12–16)
LACTATE SERPL-SCNC: 1.4 MMOL/L — SIGNIFICANT CHANGE UP (ref 0.7–2)
MAGNESIUM SERPL-MCNC: 3.2 MG/DL — CRITICAL HIGH (ref 1.8–2.4)
MCHC RBC-ENTMCNC: 27.6 PG — SIGNIFICANT CHANGE UP (ref 27–31)
MCHC RBC-ENTMCNC: 32.2 G/DL — SIGNIFICANT CHANGE UP (ref 32–37)
MCV RBC AUTO: 85.8 FL — SIGNIFICANT CHANGE UP (ref 81–99)
NRBC # BLD: 0 /100 WBCS — SIGNIFICANT CHANGE UP (ref 0–0)
PLATELET # BLD AUTO: 446 K/UL — HIGH (ref 130–400)
PMV BLD: 10.9 FL — HIGH (ref 7.4–10.4)
POTASSIUM SERPL-MCNC: 6.2 MMOL/L — CRITICAL HIGH (ref 3.5–5)
POTASSIUM SERPL-SCNC: 6.2 MMOL/L — CRITICAL HIGH (ref 3.5–5)
PROT SERPL-MCNC: 6.3 G/DL — SIGNIFICANT CHANGE UP (ref 6–8)
RBC # BLD: 5.14 M/UL — SIGNIFICANT CHANGE UP (ref 4.2–5.4)
RBC # FLD: 14.1 % — SIGNIFICANT CHANGE UP (ref 11.5–14.5)
SODIUM SERPL-SCNC: 130 MMOL/L — LOW (ref 135–146)
WBC # BLD: 9.14 K/UL — SIGNIFICANT CHANGE UP (ref 4.8–10.8)
WBC # FLD AUTO: 9.14 K/UL — SIGNIFICANT CHANGE UP (ref 4.8–10.8)

## 2023-08-25 PROCEDURE — 93010 ELECTROCARDIOGRAM REPORT: CPT

## 2023-08-25 PROCEDURE — 99232 SBSQ HOSP IP/OBS MODERATE 35: CPT

## 2023-08-25 RX ORDER — CALCIUM GLUCONATE 100 MG/ML
1 VIAL (ML) INTRAVENOUS ONCE
Refills: 0 | Status: COMPLETED | OUTPATIENT
Start: 2023-08-25 | End: 2023-08-25

## 2023-08-25 RX ORDER — MAGNESIUM HYDROXIDE 400 MG/1
30 TABLET, CHEWABLE ORAL ONCE
Refills: 0 | Status: COMPLETED | OUTPATIENT
Start: 2023-08-25 | End: 2023-08-25

## 2023-08-25 RX ORDER — CALCIUM GLUCONATE 100 MG/ML
2 VIAL (ML) INTRAVENOUS ONCE
Refills: 0 | Status: DISCONTINUED | OUTPATIENT
Start: 2023-08-25 | End: 2023-08-26

## 2023-08-25 RX ORDER — SODIUM ZIRCONIUM CYCLOSILICATE 10 G/10G
10 POWDER, FOR SUSPENSION ORAL ONCE
Refills: 0 | Status: COMPLETED | OUTPATIENT
Start: 2023-08-25 | End: 2023-08-25

## 2023-08-25 RX ORDER — POLYETHYLENE GLYCOL 3350 17 G/17G
17 POWDER, FOR SOLUTION ORAL ONCE
Refills: 0 | Status: COMPLETED | OUTPATIENT
Start: 2023-08-25 | End: 2023-08-25

## 2023-08-25 RX ADMIN — SODIUM ZIRCONIUM CYCLOSILICATE 10 GRAM(S): 10 POWDER, FOR SUSPENSION ORAL at 15:26

## 2023-08-25 RX ADMIN — FAMOTIDINE 20 MILLIGRAM(S): 10 INJECTION INTRAVENOUS at 11:10

## 2023-08-25 RX ADMIN — MAGNESIUM HYDROXIDE 30 MILLILITER(S): 400 TABLET, CHEWABLE ORAL at 11:09

## 2023-08-25 RX ADMIN — SENNA PLUS 2 TABLET(S): 8.6 TABLET ORAL at 21:22

## 2023-08-25 RX ADMIN — BUDESONIDE AND FORMOTEROL FUMARATE DIHYDRATE 2 PUFF(S): 160; 4.5 AEROSOL RESPIRATORY (INHALATION) at 09:39

## 2023-08-25 RX ADMIN — BUDESONIDE AND FORMOTEROL FUMARATE DIHYDRATE 2 PUFF(S): 160; 4.5 AEROSOL RESPIRATORY (INHALATION) at 22:23

## 2023-08-25 RX ADMIN — POLYETHYLENE GLYCOL 3350 17 GRAM(S): 17 POWDER, FOR SOLUTION ORAL at 05:28

## 2023-08-25 RX ADMIN — Medication 100 GRAM(S): at 15:26

## 2023-08-25 RX ADMIN — Medication 30 MILLIGRAM(S): at 05:27

## 2023-08-25 RX ADMIN — POLYETHYLENE GLYCOL 3350 17 GRAM(S): 17 POWDER, FOR SOLUTION ORAL at 17:46

## 2023-08-25 RX ADMIN — APIXABAN 10 MILLIGRAM(S): 2.5 TABLET, FILM COATED ORAL at 05:27

## 2023-08-25 RX ADMIN — APIXABAN 5 MILLIGRAM(S): 2.5 TABLET, FILM COATED ORAL at 17:45

## 2023-08-25 RX ADMIN — Medication 25 MILLIGRAM(S): at 05:27

## 2023-08-25 RX ADMIN — Medication 1300 MILLIGRAM(S): at 17:46

## 2023-08-25 RX ADMIN — Medication 100 MILLIGRAM(S): at 06:21

## 2023-08-25 RX ADMIN — Medication 1300 MILLIGRAM(S): at 05:27

## 2023-08-25 RX ADMIN — CHLORHEXIDINE GLUCONATE 1 APPLICATION(S): 213 SOLUTION TOPICAL at 05:26

## 2023-08-25 RX ADMIN — POLYETHYLENE GLYCOL 3350 17 GRAM(S): 17 POWDER, FOR SOLUTION ORAL at 18:54

## 2023-08-25 RX ADMIN — SODIUM ZIRCONIUM CYCLOSILICATE 10 GRAM(S): 10 POWDER, FOR SUSPENSION ORAL at 18:54

## 2023-08-25 NOTE — PROGRESS NOTE ADULT - SUBJECTIVE AND OBJECTIVE BOX
Nephrology progress note     events over the last 24 h noted   .Cr improved yesterday    Allergies:  Bactrim (Hives)  Percocet 5/325 (Other)  penicillin (Short breath; Hives)    Hospital Medications:   MEDICATIONS  (STANDING):  apixaban 5 milliGRAM(s) Oral every 12 hours  budesonide 160 MICROgram(s)/formoterol 4.5 MICROgram(s) Inhaler 2 Puff(s) Inhalation two times a day  chlorhexidine 2% Cloths 1 Application(s) Topical <User Schedule>  famotidine    Tablet 20 milliGRAM(s) Oral daily  metoprolol succinate ER 25 milliGRAM(s) Oral daily  NIFEdipine XL 30 milliGRAM(s) Oral daily  nitrofurantoin monohydrate/macrocrystals (MACROBID) 100 milliGRAM(s) Oral <User Schedule>  polyethylene glycol 3350 17 Gram(s) Oral two times a day  senna 2 Tablet(s) Oral at bedtime  sodium bicarbonate 1300 milliGRAM(s) Oral two times a day        VITALS:  T(F): 96.9 (08-25-23 @ 06:01), Max: 96.9 (08-25-23 @ 06:01)  HR: 92 (08-25-23 @ 06:01)  BP: 129/66 (08-25-23 @ 06:01)  RR: 19 (08-25-23 @ 06:01)  SpO2: 98% (08-25-23 @ 06:01)  Wt(kg): --        PHYSICAL EXAM:  Constitutional: NAD  HEENT: anicteric sclera, oropharynx clear, MMM  Neck: No JVD  Respiratory: CTAB, no wheezes, rales or rhonchi  Cardiovascular: S1, S2, RRR  Gastrointestinal: BS+, soft, NT/ND  Extremities: No cyanosis or clubbing. No peripheral edema  :  No falcon.   Skin: No rashes    LABS:  08-24    134<L>  |  112<H>  |  51<H>  ----------------------------<  76  3.1<L>   |  11<L>  |  1.1    Ca    5.5<LL>      24 Aug 2023 08:40  Mg     1.4     08-24    TPro  4.0<L>  /  Alb  1.9<L>  /  TBili  <0.2  /  DBili  <0.2  /  AST  14  /  ALT  <5  /  AlkPhos  37  08-24                          14.2   9.14  )-----------( 446      ( 25 Aug 2023 11:27 )             44.1       Urine Studies:  Urinalysis Basic - ( 24 Aug 2023 08:40 )    Color:  / Appearance:  / SG:  / pH:   Gluc: 76 mg/dL / Ketone:   / Bili:  / Urobili:    Blood:  / Protein:  / Nitrite:    Leuk Esterase:  / RBC:  / WBC    Sq Epi:  / Non Sq Epi:  / Bacteria:         RADIOLOGY & ADDITIONAL STUDIES:

## 2023-08-25 NOTE — PROGRESS NOTE ADULT - ASSESSMENT
86 yo F PMHx HTN, breast cancer and lymphoma s/p surgery in 2004, glaucoma, presenting for worsening dizziness and unsteadiness over the past week. COVID +ve on 8/20. Nephrology was consulted as the patient had an HERMAN  HERMAN on CKD stage 3 B (from pre-renal )  - On treatment for UTI and COVID  - Cr 1.8  baseline 1.4 /repeat yesterday improved, check today ensure not error  - check phos  -  sodium bicarbonate 650 mg  q 8  - on eliquis  will follow

## 2023-08-25 NOTE — PROGRESS NOTE ADULT - SUBJECTIVE AND OBJECTIVE BOX
pt seen and examined.     Resident's notes reviewed, My notes supersede resident's notes in case of discrepancy       ROS: no cp, no sob, no n/v, no fever    Vital Signs Last 24 Hrs  T(C): 36.1 (25 Aug 2023 06:01), Max: 36.1 (25 Aug 2023 06:01)  T(F): 96.9 (25 Aug 2023 06:01), Max: 96.9 (25 Aug 2023 06:01)  HR: 92 (25 Aug 2023 06:01) (92 - 97)  BP: 129/66 (25 Aug 2023 06:01) (115/62 - 129/66)  BP(mean): --  RR: 19 (25 Aug 2023 06:01) (19 - 20)  SpO2: 98% (25 Aug 2023 06:01) (97% - 98%)    Parameters below as of 24 Aug 2023 20:48  Patient On (Oxygen Delivery Method): room air    physical exam  constitutional NAD, AAOX3, Respiratory  lungs CTA, CVS heart RRR, GI: abdomen Soft NT, ND, BS+, skin: intact  neuro exam Motor, sensory and CN normal, no deficit     MEDICATIONS  (STANDING):  apixaban 5 milliGRAM(s) Oral every 12 hours  budesonide 160 MICROgram(s)/formoterol 4.5 MICROgram(s) Inhaler 2 Puff(s) Inhalation two times a day  chlorhexidine 2% Cloths 1 Application(s) Topical <User Schedule>  famotidine    Tablet 20 milliGRAM(s) Oral daily  metoprolol succinate ER 25 milliGRAM(s) Oral daily  NIFEdipine XL 30 milliGRAM(s) Oral daily  nitrofurantoin monohydrate/macrocrystals (MACROBID) 100 milliGRAM(s) Oral <User Schedule>  polyethylene glycol 3350 17 Gram(s) Oral two times a day  senna 2 Tablet(s) Oral at bedtime  sodium bicarbonate 1300 milliGRAM(s) Oral two times a day    MEDICATIONS  (PRN):  acetaminophen     Tablet .. 650 milliGRAM(s) Oral every 6 hours PRN Temp greater or equal to 38C (100.4F)  guaifenesin/dextromethorphan Oral Liquid 10 milliLiter(s) Oral every 4 hours PRN Cough  meclizine 12.5 milliGRAM(s) Oral two times a day PRN Dizziness                            11.3   7.25  )-----------( 316      ( 24 Aug 2023 08:40 )             34.6     08-24    134<L>  |  112<H>  |  51<H>  ----------------------------<  76  3.1<L>   |  11<L>  |  1.1    Ca    5.5<LL>      24 Aug 2023 08:40  Mg     1.4     08-24    TPro  4.0<L>  /  Alb  1.9<L>  /  TBili  <0.2  /  DBili  <0.2  /  AST  14  /  ALT  <5  /  AlkPhos  37  08-24    D-Dimer Assay, Quantitative: 479 ng/mL DDU (08-21-23 @ 11:54)  Ferritin: 313 ng/mL [13 - 330] (08-21-23 @ 11:54)  Procalcitonin, Serum: 0.18 ng/mL [0.02 - 0.10] (08-21-23 @ 11:54)    < from: Xray Kidney Ureter Bladder (08.24.23 @ 15:39) >  Nonobstructive bowel gas pattern. There is gas in the colon. Calcified   uterine fibroids seen in the region of the pelvis. No evidence of free   air. Stool seen in the rectum.    IMPRESSION:    Nonobstructive bowel gas pattern.    < end of copied text >    a/p  88yo F PMHx HTN, breast cancer and lymphoma s/p surgery in 2004, glaucoma, presenting for worsening dizziness and unsteadiness over the past week. Dizziness is described as feeling lightheaded, present at rest, worse whenever she bends down to get something, and in the past few days has forced her to hold onto the walls when she is walking around her house. Patient reports feeling more weakness in her legs but no numbness. pt has dizziness.   today she has abd pain    # abd pain, resolved     # dizziness. no vertigo, no nystagmus , positive orthostatic hypotension,   Orthostatic VS  sp iv fluid     Orthostatic VS    08-24-23 @ 15:00  Lying BP: Orthostatic BP (Lying Systolic): 126/Orthostatic BP (Lying Diastolic (mm Hg)): 73 HR: Orthostatic Pulse (Heart Rate (beats/min)): 93   Sitting BP: Orthostatic BP (Sitting Systolic): 106/Orthostatic BP (Sitting Diastolic (mm Hg)): 57 HR: Orthostatic Pulse (Heart Rate (beats/min)): 111  Standing BP: --/-- HR: --  Site: Orthostatic BP/Pulse (Site): upper left arm   Mode: Orthostatic BP/ Pulse (Mode): electronic    08-23-23 @ 14:03  Lying BP: Orthostatic BP (Lying Systolic): 124/Orthostatic BP (Lying Diastolic (mm Hg)): 58 HR: Orthostatic Pulse (Heart Rate (beats/min)): 94   Sitting BP: Orthostatic BP (Sitting Systolic): 88/Orthostatic BP (Sitting Diastolic (mm Hg)): 60 HR: Orthostatic Pulse (Heart Rate (beats/min)): 119  Standing BP: --/-- HR: --  Site: Orthostatic BP/Pulse (Site): upper left arm   Mode: Orthostatic BP/ Pulse (Mode): electronic      sp ivf, repeat orthostatic  possible due to dehydration   physical therapy  eval      check echo       # covid infection  not hypoxic  ID notes appreciated  finished 3 days of remdisivir     # hx of lymphoma and breast ca, sp surgery cont outpt followup     # hypertension controlled,     # chavo with HAGMA and mild hyponatremia , possible cystitis   today chavo improved with ivf but still has acidosis,   cont bicarb   creatinine trend  1.1 (08-24-23 @ 08:40)  1.6 (08-21-23 @ 06:40)    # hypokalemia, and hypomagnesemia, ( mag def)   replace mag  check ecg     # anemia of chronic disease, b12, folate, and ferritin wnl   Hemoglobin: 11.3 g/dL (08-24-23 @ 08:40)      #Progress Note Handoff  Pending : ecg, repeat orthostatic bp and HR , KUB   Family discussion: chantale pt   Disposition: STR   time spent : 35 min

## 2023-08-26 LAB
ALBUMIN SERPL ELPH-MCNC: 2.8 G/DL — LOW (ref 3.5–5.2)
ALBUMIN SERPL ELPH-MCNC: 3 G/DL — LOW (ref 3.5–5.2)
ALP SERPL-CCNC: 59 U/L — SIGNIFICANT CHANGE UP (ref 30–115)
ALP SERPL-CCNC: 59 U/L — SIGNIFICANT CHANGE UP (ref 30–115)
ALT FLD-CCNC: 6 U/L — SIGNIFICANT CHANGE UP (ref 0–41)
ALT FLD-CCNC: <5 U/L — SIGNIFICANT CHANGE UP (ref 0–41)
ANION GAP SERPL CALC-SCNC: 14 MMOL/L — SIGNIFICANT CHANGE UP (ref 7–14)
ANION GAP SERPL CALC-SCNC: 15 MMOL/L — HIGH (ref 7–14)
AST SERPL-CCNC: 19 U/L — SIGNIFICANT CHANGE UP (ref 0–41)
AST SERPL-CCNC: 20 U/L — SIGNIFICANT CHANGE UP (ref 0–41)
BASOPHILS # BLD AUTO: 0.07 K/UL — SIGNIFICANT CHANGE UP (ref 0–0.2)
BASOPHILS NFR BLD AUTO: 0.7 % — SIGNIFICANT CHANGE UP (ref 0–1)
BILIRUB DIRECT SERPL-MCNC: <0.2 MG/DL — SIGNIFICANT CHANGE UP (ref 0–0.3)
BILIRUB INDIRECT FLD-MCNC: >0.1 MG/DL — LOW (ref 0.2–1.2)
BILIRUB SERPL-MCNC: 0.2 MG/DL — SIGNIFICANT CHANGE UP (ref 0.2–1.2)
BILIRUB SERPL-MCNC: 0.3 MG/DL — SIGNIFICANT CHANGE UP (ref 0.2–1.2)
BUN SERPL-MCNC: 76 MG/DL — CRITICAL HIGH (ref 10–20)
BUN SERPL-MCNC: 76 MG/DL — CRITICAL HIGH (ref 10–20)
CALCIUM SERPL-MCNC: 9 MG/DL — SIGNIFICANT CHANGE UP (ref 8.4–10.5)
CALCIUM SERPL-MCNC: 9.3 MG/DL — SIGNIFICANT CHANGE UP (ref 8.4–10.5)
CHLORIDE SERPL-SCNC: 101 MMOL/L — SIGNIFICANT CHANGE UP (ref 98–110)
CHLORIDE SERPL-SCNC: 102 MMOL/L — SIGNIFICANT CHANGE UP (ref 98–110)
CO2 SERPL-SCNC: 15 MMOL/L — LOW (ref 17–32)
CO2 SERPL-SCNC: 16 MMOL/L — LOW (ref 17–32)
CREAT SERPL-MCNC: 1.7 MG/DL — HIGH (ref 0.7–1.5)
CREAT SERPL-MCNC: 1.9 MG/DL — HIGH (ref 0.7–1.5)
CULTURE RESULTS: SIGNIFICANT CHANGE UP
EGFR: 26 ML/MIN/1.73M2 — LOW
EGFR: 29 ML/MIN/1.73M2 — LOW
EOSINOPHIL # BLD AUTO: 0.12 K/UL — SIGNIFICANT CHANGE UP (ref 0–0.7)
EOSINOPHIL NFR BLD AUTO: 1.3 % — SIGNIFICANT CHANGE UP (ref 0–8)
GLUCOSE BLDC GLUCOMTR-MCNC: 104 MG/DL — HIGH (ref 70–99)
GLUCOSE BLDC GLUCOMTR-MCNC: 124 MG/DL — HIGH (ref 70–99)
GLUCOSE SERPL-MCNC: 101 MG/DL — HIGH (ref 70–99)
GLUCOSE SERPL-MCNC: 88 MG/DL — SIGNIFICANT CHANGE UP (ref 70–99)
HCT VFR BLD CALC: 43 % — SIGNIFICANT CHANGE UP (ref 37–47)
HGB BLD-MCNC: 13.9 G/DL — SIGNIFICANT CHANGE UP (ref 12–16)
IMM GRANULOCYTES NFR BLD AUTO: 2.3 % — HIGH (ref 0.1–0.3)
LYMPHOCYTES # BLD AUTO: 1.37 K/UL — SIGNIFICANT CHANGE UP (ref 1.2–3.4)
LYMPHOCYTES # BLD AUTO: 14.5 % — LOW (ref 20.5–51.1)
MAGNESIUM SERPL-MCNC: 3.2 MG/DL — CRITICAL HIGH (ref 1.8–2.4)
MAGNESIUM SERPL-MCNC: 3.3 MG/DL — CRITICAL HIGH (ref 1.8–2.4)
MCHC RBC-ENTMCNC: 28 PG — SIGNIFICANT CHANGE UP (ref 27–31)
MCHC RBC-ENTMCNC: 32.3 G/DL — SIGNIFICANT CHANGE UP (ref 32–37)
MCV RBC AUTO: 86.5 FL — SIGNIFICANT CHANGE UP (ref 81–99)
MONOCYTES # BLD AUTO: 1.44 K/UL — HIGH (ref 0.1–0.6)
MONOCYTES NFR BLD AUTO: 15.2 % — HIGH (ref 1.7–9.3)
NEUTROPHILS # BLD AUTO: 6.24 K/UL — SIGNIFICANT CHANGE UP (ref 1.4–6.5)
NEUTROPHILS NFR BLD AUTO: 66 % — SIGNIFICANT CHANGE UP (ref 42.2–75.2)
NRBC # BLD: 0 /100 WBCS — SIGNIFICANT CHANGE UP (ref 0–0)
PLATELET # BLD AUTO: 421 K/UL — HIGH (ref 130–400)
PMV BLD: 10.6 FL — HIGH (ref 7.4–10.4)
POTASSIUM SERPL-MCNC: 5.6 MMOL/L — HIGH (ref 3.5–5)
POTASSIUM SERPL-MCNC: 6.2 MMOL/L — CRITICAL HIGH (ref 3.5–5)
POTASSIUM SERPL-SCNC: 5.6 MMOL/L — HIGH (ref 3.5–5)
POTASSIUM SERPL-SCNC: 6.2 MMOL/L — CRITICAL HIGH (ref 3.5–5)
PROT SERPL-MCNC: 5.8 G/DL — LOW (ref 6–8)
PROT SERPL-MCNC: 6 G/DL — SIGNIFICANT CHANGE UP (ref 6–8)
RBC # BLD: 4.97 M/UL — SIGNIFICANT CHANGE UP (ref 4.2–5.4)
RBC # FLD: 14.4 % — SIGNIFICANT CHANGE UP (ref 11.5–14.5)
SODIUM SERPL-SCNC: 131 MMOL/L — LOW (ref 135–146)
SODIUM SERPL-SCNC: 132 MMOL/L — LOW (ref 135–146)
SPECIMEN SOURCE: SIGNIFICANT CHANGE UP
WBC # BLD: 9.46 K/UL — SIGNIFICANT CHANGE UP (ref 4.8–10.8)
WBC # FLD AUTO: 9.46 K/UL — SIGNIFICANT CHANGE UP (ref 4.8–10.8)

## 2023-08-26 PROCEDURE — 99232 SBSQ HOSP IP/OBS MODERATE 35: CPT

## 2023-08-26 RX ORDER — SODIUM ZIRCONIUM CYCLOSILICATE 10 G/10G
10 POWDER, FOR SUSPENSION ORAL DAILY
Refills: 0 | Status: COMPLETED | OUTPATIENT
Start: 2023-08-26 | End: 2023-08-26

## 2023-08-26 RX ORDER — SODIUM BICARBONATE 1 MEQ/ML
1300 SYRINGE (ML) INTRAVENOUS THREE TIMES A DAY
Refills: 0 | Status: DISCONTINUED | OUTPATIENT
Start: 2023-08-26 | End: 2023-08-29

## 2023-08-26 RX ORDER — CALCIUM GLUCONATE 100 MG/ML
1 VIAL (ML) INTRAVENOUS ONCE
Refills: 0 | Status: COMPLETED | OUTPATIENT
Start: 2023-08-26 | End: 2023-08-26

## 2023-08-26 RX ORDER — SALICYLIC ACID 0.5 %
1 CLEANSER (GRAM) TOPICAL ONCE
Refills: 0 | Status: COMPLETED | OUTPATIENT
Start: 2023-08-26 | End: 2023-08-26

## 2023-08-26 RX ORDER — ACETAMINOPHEN 500 MG
650 TABLET ORAL EVERY 6 HOURS
Refills: 0 | Status: DISCONTINUED | OUTPATIENT
Start: 2023-08-26 | End: 2023-08-26

## 2023-08-26 RX ORDER — LIDOCAINE 4 G/100G
1 CREAM TOPICAL ONCE
Refills: 0 | Status: COMPLETED | OUTPATIENT
Start: 2023-08-26 | End: 2023-08-26

## 2023-08-26 RX ORDER — DEXTROSE 50 % IN WATER 50 %
25 SYRINGE (ML) INTRAVENOUS ONCE
Refills: 0 | Status: COMPLETED | OUTPATIENT
Start: 2023-08-26 | End: 2023-08-26

## 2023-08-26 RX ORDER — INSULIN HUMAN 100 [IU]/ML
5 INJECTION, SOLUTION SUBCUTANEOUS ONCE
Refills: 0 | Status: COMPLETED | OUTPATIENT
Start: 2023-08-26 | End: 2023-08-26

## 2023-08-26 RX ORDER — ACETAMINOPHEN 500 MG
650 TABLET ORAL EVERY 6 HOURS
Refills: 0 | Status: DISCONTINUED | OUTPATIENT
Start: 2023-08-26 | End: 2023-08-29

## 2023-08-26 RX ADMIN — Medication 1300 MILLIGRAM(S): at 21:49

## 2023-08-26 RX ADMIN — Medication 100 GRAM(S): at 11:50

## 2023-08-26 RX ADMIN — POLYETHYLENE GLYCOL 3350 17 GRAM(S): 17 POWDER, FOR SOLUTION ORAL at 18:01

## 2023-08-26 RX ADMIN — POLYETHYLENE GLYCOL 3350 17 GRAM(S): 17 POWDER, FOR SOLUTION ORAL at 05:31

## 2023-08-26 RX ADMIN — Medication 1300 MILLIGRAM(S): at 13:05

## 2023-08-26 RX ADMIN — BUDESONIDE AND FORMOTEROL FUMARATE DIHYDRATE 2 PUFF(S): 160; 4.5 AEROSOL RESPIRATORY (INHALATION) at 08:04

## 2023-08-26 RX ADMIN — SODIUM ZIRCONIUM CYCLOSILICATE 10 GRAM(S): 10 POWDER, FOR SUSPENSION ORAL at 11:56

## 2023-08-26 RX ADMIN — SENNA PLUS 2 TABLET(S): 8.6 TABLET ORAL at 21:49

## 2023-08-26 RX ADMIN — LIDOCAINE 1 PATCH: 4 CREAM TOPICAL at 23:02

## 2023-08-26 RX ADMIN — Medication 1 APPLICATION(S): at 14:23

## 2023-08-26 RX ADMIN — Medication 25 MILLILITER(S): at 11:37

## 2023-08-26 RX ADMIN — Medication 25 MILLIGRAM(S): at 05:30

## 2023-08-26 RX ADMIN — Medication 650 MILLIGRAM(S): at 18:52

## 2023-08-26 RX ADMIN — Medication 1300 MILLIGRAM(S): at 05:30

## 2023-08-26 RX ADMIN — FAMOTIDINE 20 MILLIGRAM(S): 10 INJECTION INTRAVENOUS at 11:54

## 2023-08-26 RX ADMIN — APIXABAN 5 MILLIGRAM(S): 2.5 TABLET, FILM COATED ORAL at 05:30

## 2023-08-26 RX ADMIN — Medication 30 MILLIGRAM(S): at 05:30

## 2023-08-26 RX ADMIN — APIXABAN 5 MILLIGRAM(S): 2.5 TABLET, FILM COATED ORAL at 18:01

## 2023-08-26 RX ADMIN — Medication 100 MILLIGRAM(S): at 06:13

## 2023-08-26 RX ADMIN — INSULIN HUMAN 5 UNIT(S): 100 INJECTION, SOLUTION SUBCUTANEOUS at 11:40

## 2023-08-26 RX ADMIN — CHLORHEXIDINE GLUCONATE 1 APPLICATION(S): 213 SOLUTION TOPICAL at 05:27

## 2023-08-26 NOTE — PROGRESS NOTE ADULT - ASSESSMENT
86 yo F PMHx HTN, breast cancer and lymphoma s/p surgery in 2004, glaucoma, presenting for worsening dizziness and unsteadiness over the past week. COVID +ve on 8/20. Nephrology was consulted as the patient had an HERMAN  HERMAN on CKD stage 3 B (from pre-renal )  - On treatment for UTI and COVID  - Cr stable   - please document UO   - check bladder scan  - increase sodium bicarbonate to q 8 if repeated bicarbonate <18   - start lokelma 10 q 24 if repeated k >5.5  - check TSH/ cortisol level   - d/c nifedipine if bp remains on the low side   will follow

## 2023-08-26 NOTE — PROGRESS NOTE ADULT - SUBJECTIVE AND OBJECTIVE BOX
24H events:    Patient is a 87y old Female who presents with a chief complaint of Dizziness (26 Aug 2023 10:51)    Primary diagnosis of Dizziness      Day 1:  Day 2:  Day 3:     Today is hospital day 11d. This morning patient was seen and examined at bedside, resting comfortably in bed.    No acute or major events overnight. Hemodynamically stable, tolerating oral diet, voiding appropriately with appropriate bowel movements.     Code Status:    Family communication:  Contact date: 08.26.23  Name of person contacted: Elva Schwarz  Relationship to patient: Niece  Communication details: Follow up on her respiratory and urinary infection and on current HERMAN    PAST MEDICAL & SURGICAL HISTORY  - Prolapsed uterus   - Hernia  - HTN (hypertension)  - GERD (gastroesophageal reflux disease)  - Breast CA  - No significant past surgical history      SOCIAL HISTORY:  Social History:  Lives alone in [place] (15 Aug 2023 15:49)      ALLERGIES:  Bactrim (Hives)  penicillin (Short breath; Hives)    MEDICATIONS:  STANDING MEDICATIONS  apixaban 5 milliGRAM(s) Oral every 12 hours  budesonide 160 MICROgram(s)/formoterol 4.5 MICROgram(s) Inhaler 2 Puff(s) Inhalation two times a day  chlorhexidine 2% Cloths 1 Application(s) Topical <User Schedule>  famotidine    Tablet 20 milliGRAM(s) Oral daily  metoprolol succinate ER 25 milliGRAM(s) Oral daily  nitrofurantoin monohydrate/macrocrystals (MACROBID) 100 milliGRAM(s) Oral <User Schedule>  polyethylene glycol 3350 17 Gram(s) Oral two times a day  senna 2 Tablet(s) Oral at bedtime  sodium bicarbonate 1300 milliGRAM(s) Oral three times a day    PRN MEDICATIONS  acetaminophen     Tablet .. 650 milliGRAM(s) Oral every 6 hours PRN  meclizine 12.5 milliGRAM(s) Oral two times a day PRN    VITALS:   T(F): 97.5  HR: 83  BP: 118/58  RR: 18  SpO2: 97%    PHYSICAL EXAM:  GENERAL: NAD, lying in bed comfortably and cooperative  NECK: Supple, no neck stiffness/nuchal rigidity, no JVD    HEART: Regular rate and rhythm, normal S1/S2   LUNGS: No acute respiratory distress   ABDOMEN:  soft, non-tender, non-distented   EXTREMITIES: no rashes, extremities warm/dry, 1+ edema bilateraly  NERVOUS SYSTEM:  A&Ox3, CNII-XII intace, follows commands, answers questions appropriately   SKIN: No rashes or lesions         LABS:                        13.9   9.46  )-----------( 421      ( 26 Aug 2023 06:49 )             43.0     08-26    132<L>  |  102  |  76<HH>  ----------------------------<  88  6.2<HH>   |  15<L>  |  1.9<H>    Ca    9.3      26 Aug 2023 06:49  Mg     3.2     08-26    TPro  6.0  /  Alb  3.0<L>  /  TBili  0.3  /  DBili  <0.2  /  AST  20  /  ALT  <5  /  AlkPhos  59  08-26      Urinalysis Basic - ( 26 Aug 2023 06:49 )    Color: x / Appearance: x / SG: x / pH: x  Gluc: 88 mg/dL / Ketone: x  / Bili: x / Urobili: x   Blood: x / Protein: x / Nitrite: x   Leuk Esterase: x / RBC: x / WBC x   Sq Epi: x / Non Sq Epi: x / Bacteria: x

## 2023-08-26 NOTE — PROGRESS NOTE ADULT - ASSESSMENT
88yo F PMHx HTN, breast cancer and lymphoma s/p surgery in 2004, glaucoma, presenting for worsening dizziness and unsteadiness over the past week. Dizziness is described as feeling lightheaded, present at rest, worse whenever she bends down to get something, and in the past few days has forced her to hold onto the walls when she is walking around her house. Patient reports feeling more weakness in her legs but no numbness. Abdominal pain has improved. Patient has dizziness.       # hyperkalemia   herman on ckd  sp lokelma 10 mg daily  Patient given   - 25 mL of D50   - insulin 5 units  - calcium carbonate 1000 mg given once   repeat labs next round   creatinine trend  1.9 (08-26-23 @ 06:49)  1.5 (08-21-23 @ 11:54)      # abd pain, resolved , probably due to constipation     # dizziness. no vertigo, no nystagmus , due to positive orthostatic hypotension,     Orthostatic VS    08-25-23 @ 15:19  Lying BP: Orthostatic BP (Lying Systolic): 109/Orthostatic BP (Lying Diastolic (mm Hg)): 55 HR: Orthostatic Pulse (Heart Rate (beats/min)): 96   Sitting BP: Orthostatic BP (Sitting Systolic): 104/Orthostatic BP (Sitting Diastolic (mm Hg)): 60 HR: Orthostatic Pulse (Heart Rate (beats/min)): 104  Standing BP: --/-- HR: --  Site: --   Mode: --      sp ivf, repeat orthostatic  dc nifedipine  possible due to dehydration   physical therapy  eval      check echo     # COVID infection  - finished 3 days of remdisivir   - Not hypoxic, saturating at 97% on room air    # hx of lymphoma and breast ca, sp surgery cont outpt followup     # hypertension controlled,     # HERMAN with HAGMA and mild hyponatremia , possible cystitis   today herman improved with ivf but still has acidosis,   Biicarb increased from twice to three times daily  creatinine trend  1.1 (08-24-23 @ 08:40)  1.6 (08-21-23 @ 06:40)    # Resolution of in hospital episodes: hypokalemia, and hypomagnesemia  - Last measurements: Mg 3.2 on 08-26-23 and K 6.2      # anemia of chronic disease, b12, folate, and ferritin wnl   Hemoglobin: 11.3 g/dL (08-24-23 @ 08:40)

## 2023-08-26 NOTE — PROGRESS NOTE ADULT - SUBJECTIVE AND OBJECTIVE BOX
seen and examined  24 h events noted       PAST HISTORY  --------------------------------------------------------------------------------  No significant changes to PMH, PSH, FHx, SHx, unless otherwise noted    ALLERGIES & MEDICATIONS  --------------------------------------------------------------------------------  Allergies    Bactrim (Hives)  penicillin (Short breath; Hives)    Intolerances    Percocet 5/325 (Other)    Standing Inpatient Medications  apixaban 5 milliGRAM(s) Oral every 12 hours  budesonide 160 MICROgram(s)/formoterol 4.5 MICROgram(s) Inhaler 2 Puff(s) Inhalation two times a day  calcium gluconate IVPB 2 Gram(s) IV Intermittent once  chlorhexidine 2% Cloths 1 Application(s) Topical <User Schedule>  famotidine    Tablet 20 milliGRAM(s) Oral daily  metoprolol succinate ER 25 milliGRAM(s) Oral daily  NIFEdipine XL 30 milliGRAM(s) Oral daily  nitrofurantoin monohydrate/macrocrystals (MACROBID) 100 milliGRAM(s) Oral <User Schedule>  polyethylene glycol 3350 17 Gram(s) Oral two times a day  senna 2 Tablet(s) Oral at bedtime  sodium bicarbonate 1300 milliGRAM(s) Oral two times a day    PRN Inpatient Medications  acetaminophen     Tablet .. 650 milliGRAM(s) Oral every 6 hours PRN  guaifenesin/dextromethorphan Oral Liquid 10 milliLiter(s) Oral every 4 hours PRN  meclizine 12.5 milliGRAM(s) Oral two times a day PRN          VITALS/PHYSICAL EXAM  --------------------------------------------------------------------------------  T(C): 36.4 (08-26-23 @ 05:24), Max: 36.4 (08-26-23 @ 05:24)  HR: 83 (08-26-23 @ 05:24) (83 - 92)  BP: 124/65 (08-26-23 @ 05:24) (113/65 - 130/66)  RR: 18 (08-26-23 @ 05:24) (18 - 22)  SpO2: 98% (08-26-23 @ 05:24) (97% - 98%)  Wt(kg): --        Physical Exam:  	Gen: NAD  	Pulm: decrease BS B/L  	CV: S1S2; no rub  	Abd:  soft, nondistended      LABS/STUDIES  --------------------------------------------------------------------------------              14.2   9.14  >-----------<  446      [08-25-23 @ 11:27]              44.1     131  |  101  |  76  ----------------------------<  101      [08-26-23 @ 00:59]  5.6   |  16  |  1.7        Ca     9.0     [08-26-23 @ 00:59]      Mg     3.3     [08-26-23 @ 00:59]    TPro  6.3  /  Alb  3.3  /  TBili  0.3  /  DBili  x   /  AST  19  /  ALT  7   /  AlkPhos  61  [08-25-23 @ 15:10]      Creatinine Trend:  SCr 1.7 [08-26 @ 00:59]  SCr 1.9 [08-25 @ 15:10]  SCr 1.1 [08-24 @ 08:40]  SCr 1.8 [08-23 @ 09:00]  SCr 1.5 [08-22 @ 18:34]    Urinalysis - [08-26-23 @ 00:59]      Color  / Appearance  / SG  / pH       Gluc 101 / Ketone   / Bili  / Urobili        Blood  / Protein  / Leuk Est  / Nitrite       RBC  / WBC  / Hyaline  / Gran  / Sq Epi  / Non Sq Epi  / Bacteria       Ferritin 313      [08-21-23 @ 11:54]

## 2023-08-26 NOTE — PROGRESS NOTE ADULT - SUBJECTIVE AND OBJECTIVE BOX
pt seen and examined.     Resident's notes reviewed, My notes supersede resident's notes in case of discrepancy       ROS: no cp, no sob, no n/v, no fever    Vital Signs Last 24 Hrs  T(C): 36.4 (26 Aug 2023 05:24), Max: 36.4 (26 Aug 2023 05:24)  T(F): 97.5 (26 Aug 2023 05:24), Max: 97.5 (26 Aug 2023 05:24)  HR: 83 (26 Aug 2023 05:24) (83 - 92)  BP: 118/58 (26 Aug 2023 09:35) (113/65 - 130/66)  BP(mean): --  RR: 18 (26 Aug 2023 05:24) (18 - 22)  SpO2: 97% (26 Aug 2023 07:22) (97% - 98%)    Parameters below as of 26 Aug 2023 07:22  Patient On (Oxygen Delivery Method): room air        physical exam  constitutional NAD, AAOX3, Respiratory  lungs CTA, CVS heart RRR, GI: abdomen Soft NT, ND, BS+, skin: intact  neuro exam Motor, sensory and CN normal, no deficit     MEDICATIONS  (STANDING):  apixaban 5 milliGRAM(s) Oral every 12 hours  budesonide 160 MICROgram(s)/formoterol 4.5 MICROgram(s) Inhaler 2 Puff(s) Inhalation two times a day  calcium gluconate IVPB 2 Gram(s) IV Intermittent once  chlorhexidine 2% Cloths 1 Application(s) Topical <User Schedule>  famotidine    Tablet 20 milliGRAM(s) Oral daily  metoprolol succinate ER 25 milliGRAM(s) Oral daily  nitrofurantoin monohydrate/macrocrystals (MACROBID) 100 milliGRAM(s) Oral <User Schedule>  polyethylene glycol 3350 17 Gram(s) Oral two times a day  senna 2 Tablet(s) Oral at bedtime  sodium bicarbonate 1300 milliGRAM(s) Oral two times a day  sodium zirconium cyclosilicate 10 Gram(s) Oral daily    MEDICATIONS  (PRN):  acetaminophen     Tablet .. 650 milliGRAM(s) Oral every 6 hours PRN Temp greater or equal to 38C (100.4F)  guaifenesin/dextromethorphan Oral Liquid 10 milliLiter(s) Oral every 4 hours PRN Cough  meclizine 12.5 milliGRAM(s) Oral two times a day PRN Dizziness                            13.9   9.46  )-----------( 421      ( 26 Aug 2023 06:49 )             43.0     08-26    132<L>  |  102  |  76<HH>  ----------------------------<  88  6.2<HH>   |  15<L>  |  1.9<H>    Ca    9.3      26 Aug 2023 06:49  Mg     3.2     08-26    TPro  6.0  /  Alb  3.0<L>  /  TBili  0.3  /  DBili  <0.2  /  AST  20  /  ALT  <5  /  AlkPhos  59  08-26    D-Dimer Assay, Quantitative: 479 ng/mL DDU (08-21-23 @ 11:54)  Ferritin: 313 ng/mL [13 - 330] (08-21-23 @ 11:54)  Procalcitonin, Serum: 0.18 ng/mL [0.02 - 0.10] (08-21-23 @ 11:54)    a/p  86yo F PMHx HTN, breast cancer and lymphoma s/p surgery in 2004, glaucoma, presenting for worsening dizziness and unsteadiness over the past week. Dizziness is described as feeling lightheaded, present at rest, worse whenever she bends down to get something, and in the past few days has forced her to hold onto the walls when she is walking around her house. Patient reports feeling more weakness in her legs but no numbness. pt has dizziness.   , she has abd pain which has improved     # hyperkalemia   chavo on ckd  sp lokelma, also give d50 and insulin and calcium   repeat labs next round   creatinine trend  1.9 (08-26-23 @ 06:49)  1.5 (08-21-23 @ 11:54)      # abd pain, resolved , probably due to constipation     # dizziness. no vertigo, no nystagmus , due to positive orthostatic hypotension,   Orthostatic VS  sp iv fluid     Orthostatic VS    08-25-23 @ 15:19  Lying BP: Orthostatic BP (Lying Systolic): 109/Orthostatic BP (Lying Diastolic (mm Hg)): 55 HR: Orthostatic Pulse (Heart Rate (beats/min)): 96   Sitting BP: Orthostatic BP (Sitting Systolic): 104/Orthostatic BP (Sitting Diastolic (mm Hg)): 60 HR: Orthostatic Pulse (Heart Rate (beats/min)): 104  Standing BP: --/-- HR: --  Site: --   Mode: --    08-24-23 @ 15:00  Lying BP: Orthostatic BP (Lying Systolic): 126/Orthostatic BP (Lying Diastolic (mm Hg)): 73 HR: Orthostatic Pulse (Heart Rate (beats/min)): 93   Sitting BP: Orthostatic BP (Sitting Systolic): 106/Orthostatic BP (Sitting Diastolic (mm Hg)): 57 HR: Orthostatic Pulse (Heart Rate (beats/min)): 111  Standing BP: --/-- HR: --  Site: Orthostatic BP/Pulse (Site): upper left arm   Mode: Orthostatic BP/ Pulse (Mode): electronic      sp ivf, repeat orthostatic  dc nifedipine  possible due to dehydration   physical therapy  eval      check echo     # covid infection  not hypoxic  ID notes appreciated  finished 3 days of remdisivir     # hx of lymphoma and breast ca, sp surgery cont outpt followup     # hypertension controlled,     # chavo with HAGMA and mild hyponatremia , possible cystitis   today chavo improved with ivf but still has acidosis,   cont bicarb   creatinine trend  1.1 (08-24-23 @ 08:40)  1.6 (08-21-23 @ 06:40)    # hypokalemia, and hypomagnesemia, ( mag def)   replace mag  check ecg     # anemia of chronic disease, b12, folate, and ferritin wnl   Hemoglobin: 11.3 g/dL (08-24-23 @ 08:40)    #Progress Note Handoff  Pending : ecg, repeat orthostatic bp and HR , KUB   Family discussion: chantale pt   Disposition: STR   time spent : 35 min

## 2023-08-27 LAB
ALBUMIN SERPL ELPH-MCNC: 2.9 G/DL — LOW (ref 3.5–5.2)
ALBUMIN SERPL ELPH-MCNC: 2.9 G/DL — LOW (ref 3.5–5.2)
ALBUMIN SERPL ELPH-MCNC: 3 G/DL — LOW (ref 3.5–5.2)
ALP SERPL-CCNC: 60 U/L — SIGNIFICANT CHANGE UP (ref 30–115)
ALP SERPL-CCNC: 62 U/L — SIGNIFICANT CHANGE UP (ref 30–115)
ALP SERPL-CCNC: 64 U/L — SIGNIFICANT CHANGE UP (ref 30–115)
ALT FLD-CCNC: 6 U/L — SIGNIFICANT CHANGE UP (ref 0–41)
ALT FLD-CCNC: 6 U/L — SIGNIFICANT CHANGE UP (ref 0–41)
ALT FLD-CCNC: 7 U/L — SIGNIFICANT CHANGE UP (ref 0–41)
ANION GAP SERPL CALC-SCNC: 15 MMOL/L — HIGH (ref 7–14)
ANION GAP SERPL CALC-SCNC: 17 MMOL/L — HIGH (ref 7–14)
AST SERPL-CCNC: 20 U/L — SIGNIFICANT CHANGE UP (ref 0–41)
AST SERPL-CCNC: 22 U/L — SIGNIFICANT CHANGE UP (ref 0–41)
AST SERPL-CCNC: 22 U/L — SIGNIFICANT CHANGE UP (ref 0–41)
BASOPHILS # BLD AUTO: 0.09 K/UL — SIGNIFICANT CHANGE UP (ref 0–0.2)
BASOPHILS NFR BLD AUTO: 0.9 % — SIGNIFICANT CHANGE UP (ref 0–1)
BILIRUB DIRECT SERPL-MCNC: <0.2 MG/DL — SIGNIFICANT CHANGE UP (ref 0–0.3)
BILIRUB INDIRECT FLD-MCNC: >0.1 MG/DL — LOW (ref 0.2–1.2)
BILIRUB SERPL-MCNC: 0.3 MG/DL — SIGNIFICANT CHANGE UP (ref 0.2–1.2)
BILIRUB SERPL-MCNC: 0.3 MG/DL — SIGNIFICANT CHANGE UP (ref 0.2–1.2)
BILIRUB SERPL-MCNC: 0.4 MG/DL — SIGNIFICANT CHANGE UP (ref 0.2–1.2)
BLD GP AB SCN SERPL QL: SIGNIFICANT CHANGE UP
BUN SERPL-MCNC: 70 MG/DL — CRITICAL HIGH (ref 10–20)
BUN SERPL-MCNC: 74 MG/DL — CRITICAL HIGH (ref 10–20)
CALCIUM SERPL-MCNC: 9.2 MG/DL — SIGNIFICANT CHANGE UP (ref 8.4–10.5)
CALCIUM SERPL-MCNC: 9.4 MG/DL — SIGNIFICANT CHANGE UP (ref 8.4–10.5)
CHLORIDE SERPL-SCNC: 97 MMOL/L — LOW (ref 98–110)
CHLORIDE SERPL-SCNC: 98 MMOL/L — SIGNIFICANT CHANGE UP (ref 98–110)
CO2 SERPL-SCNC: 18 MMOL/L — SIGNIFICANT CHANGE UP (ref 17–32)
CO2 SERPL-SCNC: 18 MMOL/L — SIGNIFICANT CHANGE UP (ref 17–32)
CREAT SERPL-MCNC: 1.8 MG/DL — HIGH (ref 0.7–1.5)
CREAT SERPL-MCNC: 1.8 MG/DL — HIGH (ref 0.7–1.5)
EGFR: 27 ML/MIN/1.73M2 — LOW
EGFR: 27 ML/MIN/1.73M2 — LOW
EOSINOPHIL # BLD AUTO: 0.14 K/UL — SIGNIFICANT CHANGE UP (ref 0–0.7)
EOSINOPHIL NFR BLD AUTO: 1.3 % — SIGNIFICANT CHANGE UP (ref 0–8)
GLUCOSE BLDC GLUCOMTR-MCNC: 95 MG/DL — SIGNIFICANT CHANGE UP (ref 70–99)
GLUCOSE SERPL-MCNC: 82 MG/DL — SIGNIFICANT CHANGE UP (ref 70–99)
GLUCOSE SERPL-MCNC: 99 MG/DL — SIGNIFICANT CHANGE UP (ref 70–99)
HCT VFR BLD CALC: 40.2 % — SIGNIFICANT CHANGE UP (ref 37–47)
HCT VFR BLD CALC: 41.3 % — SIGNIFICANT CHANGE UP (ref 37–47)
HGB BLD-MCNC: 12.8 G/DL — SIGNIFICANT CHANGE UP (ref 12–16)
HGB BLD-MCNC: 13.7 G/DL — SIGNIFICANT CHANGE UP (ref 12–16)
IMM GRANULOCYTES NFR BLD AUTO: 3.6 % — HIGH (ref 0.1–0.3)
LYMPHOCYTES # BLD AUTO: 1.69 K/UL — SIGNIFICANT CHANGE UP (ref 1.2–3.4)
LYMPHOCYTES # BLD AUTO: 16.3 % — LOW (ref 20.5–51.1)
MAGNESIUM SERPL-MCNC: 3 MG/DL — HIGH (ref 1.8–2.4)
MCHC RBC-ENTMCNC: 27.4 PG — SIGNIFICANT CHANGE UP (ref 27–31)
MCHC RBC-ENTMCNC: 28.3 PG — SIGNIFICANT CHANGE UP (ref 27–31)
MCHC RBC-ENTMCNC: 31.8 G/DL — LOW (ref 32–37)
MCHC RBC-ENTMCNC: 33.2 G/DL — SIGNIFICANT CHANGE UP (ref 32–37)
MCV RBC AUTO: 85.3 FL — SIGNIFICANT CHANGE UP (ref 81–99)
MCV RBC AUTO: 86.1 FL — SIGNIFICANT CHANGE UP (ref 81–99)
MONOCYTES # BLD AUTO: 1.2 K/UL — HIGH (ref 0.1–0.6)
MONOCYTES NFR BLD AUTO: 11.5 % — HIGH (ref 1.7–9.3)
NEUTROPHILS # BLD AUTO: 6.9 K/UL — HIGH (ref 1.4–6.5)
NEUTROPHILS NFR BLD AUTO: 66.4 % — SIGNIFICANT CHANGE UP (ref 42.2–75.2)
NRBC # BLD: 0 /100 WBCS — SIGNIFICANT CHANGE UP (ref 0–0)
NRBC # BLD: 0 /100 WBCS — SIGNIFICANT CHANGE UP (ref 0–0)
PLATELET # BLD AUTO: 519 K/UL — HIGH (ref 130–400)
PLATELET # BLD AUTO: 524 K/UL — HIGH (ref 130–400)
PMV BLD: 10.2 FL — SIGNIFICANT CHANGE UP (ref 7.4–10.4)
PMV BLD: 10.8 FL — HIGH (ref 7.4–10.4)
POTASSIUM SERPL-MCNC: 5.3 MMOL/L — HIGH (ref 3.5–5)
POTASSIUM SERPL-MCNC: 5.3 MMOL/L — HIGH (ref 3.5–5)
POTASSIUM SERPL-SCNC: 5.3 MMOL/L — HIGH (ref 3.5–5)
POTASSIUM SERPL-SCNC: 5.3 MMOL/L — HIGH (ref 3.5–5)
PROT SERPL-MCNC: 6.1 G/DL — SIGNIFICANT CHANGE UP (ref 6–8)
PROT SERPL-MCNC: 6.1 G/DL — SIGNIFICANT CHANGE UP (ref 6–8)
PROT SERPL-MCNC: 6.2 G/DL — SIGNIFICANT CHANGE UP (ref 6–8)
RBC # BLD: 4.67 M/UL — SIGNIFICANT CHANGE UP (ref 4.2–5.4)
RBC # BLD: 4.84 M/UL — SIGNIFICANT CHANGE UP (ref 4.2–5.4)
RBC # FLD: 14.2 % — SIGNIFICANT CHANGE UP (ref 11.5–14.5)
RBC # FLD: 14.3 % — SIGNIFICANT CHANGE UP (ref 11.5–14.5)
SODIUM SERPL-SCNC: 130 MMOL/L — LOW (ref 135–146)
SODIUM SERPL-SCNC: 133 MMOL/L — LOW (ref 135–146)
WBC # BLD: 10.39 K/UL — SIGNIFICANT CHANGE UP (ref 4.8–10.8)
WBC # BLD: 11.62 K/UL — HIGH (ref 4.8–10.8)
WBC # FLD AUTO: 10.39 K/UL — SIGNIFICANT CHANGE UP (ref 4.8–10.8)
WBC # FLD AUTO: 11.62 K/UL — HIGH (ref 4.8–10.8)

## 2023-08-27 PROCEDURE — 99232 SBSQ HOSP IP/OBS MODERATE 35: CPT

## 2023-08-27 RX ADMIN — POLYETHYLENE GLYCOL 3350 17 GRAM(S): 17 POWDER, FOR SOLUTION ORAL at 05:31

## 2023-08-27 RX ADMIN — LIDOCAINE 1 PATCH: 4 CREAM TOPICAL at 08:04

## 2023-08-27 RX ADMIN — Medication 25 MILLIGRAM(S): at 05:31

## 2023-08-27 RX ADMIN — Medication 1300 MILLIGRAM(S): at 21:05

## 2023-08-27 RX ADMIN — APIXABAN 5 MILLIGRAM(S): 2.5 TABLET, FILM COATED ORAL at 17:15

## 2023-08-27 RX ADMIN — Medication 1300 MILLIGRAM(S): at 13:04

## 2023-08-27 RX ADMIN — Medication 1300 MILLIGRAM(S): at 05:31

## 2023-08-27 RX ADMIN — POLYETHYLENE GLYCOL 3350 17 GRAM(S): 17 POWDER, FOR SOLUTION ORAL at 17:15

## 2023-08-27 RX ADMIN — LIDOCAINE 1 PATCH: 4 CREAM TOPICAL at 11:36

## 2023-08-27 RX ADMIN — APIXABAN 5 MILLIGRAM(S): 2.5 TABLET, FILM COATED ORAL at 05:31

## 2023-08-27 RX ADMIN — BUDESONIDE AND FORMOTEROL FUMARATE DIHYDRATE 2 PUFF(S): 160; 4.5 AEROSOL RESPIRATORY (INHALATION) at 08:03

## 2023-08-27 RX ADMIN — Medication 100 MILLIGRAM(S): at 06:39

## 2023-08-27 RX ADMIN — SENNA PLUS 2 TABLET(S): 8.6 TABLET ORAL at 21:05

## 2023-08-27 RX ADMIN — FAMOTIDINE 20 MILLIGRAM(S): 10 INJECTION INTRAVENOUS at 11:38

## 2023-08-27 NOTE — PROGRESS NOTE ADULT - ASSESSMENT
86 yo F PMHx HTN, breast cancer and lymphoma s/p surgery in 2004, glaucoma, presenting for worsening dizziness and unsteadiness over the past week. COVID +ve on 8/20. Nephrology was consulted as the patient had an HERMAN  HERMAN on CKD stage 3 B (from pre-renal )    - On treatment for UTI and COVID  - Cr stable   - check bladder scan  - cont sodium bicarbonate to q 8  - cont  lokelma 10 q 24 if repeated k >5.5  - check TSH/ cortisol level   - off nifedipien, as BP was on low side  will follow

## 2023-08-27 NOTE — PROGRESS NOTE ADULT - SUBJECTIVE AND OBJECTIVE BOX
pt seen and examined.     Resident's notes reviewed, My notes supersede resident's notes in case of discrepancy       ROS: no cp, no sob, no n/v, no fever    Vital Signs Last 24 Hrs  T(C): 36.1 (27 Aug 2023 06:01), Max: 36.3 (26 Aug 2023 12:00)  T(F): 96.9 (27 Aug 2023 06:01), Max: 97.4 (26 Aug 2023 12:00)  HR: 79 (27 Aug 2023 06:01) (75 - 80)  BP: 146/81 (27 Aug 2023 06:01) (127/64 - 146/81)  BP(mean): 88 (26 Aug 2023 12:00) (88 - 88)  RR: 18 (27 Aug 2023 06:01) (18 - 18)  SpO2: 98% (27 Aug 2023 07:24) (95% - 99%)    Parameters below as of 27 Aug 2023 07:24  Patient On (Oxygen Delivery Method): room air    physical exam  constitutional NAD, AAOX3, Respiratory  lungs CTA, CVS heart RRR, GI: abdomen Soft NT, ND, BS+, skin: intact  neuro exam Motor, sensory and CN normal, no deficit     MEDICATIONS  (STANDING):  apixaban 5 milliGRAM(s) Oral every 12 hours  budesonide 160 MICROgram(s)/formoterol 4.5 MICROgram(s) Inhaler 2 Puff(s) Inhalation two times a day  chlorhexidine 2% Cloths 1 Application(s) Topical <User Schedule>  famotidine    Tablet 20 milliGRAM(s) Oral daily  metoprolol succinate ER 25 milliGRAM(s) Oral daily  polyethylene glycol 3350 17 Gram(s) Oral two times a day  senna 2 Tablet(s) Oral at bedtime  sodium bicarbonate 1300 milliGRAM(s) Oral three times a day    MEDICATIONS  (PRN):  acetaminophen     Tablet .. 650 milliGRAM(s) Oral every 6 hours PRN Temp greater or equal to 38C (100.4F), Mild Pain (1 - 3), Moderate Pain (4 - 6), Severe Pain (7 - 10)  meclizine 12.5 milliGRAM(s) Oral two times a day PRN Dizziness                        13.7   10.39 )-----------( 519      ( 27 Aug 2023 07:57 )             41.3     08-27    130<L>  |  97<L>  |  74<HH>  ----------------------------<  82  5.3<H>   |  18  |  1.8<H>    Ca    9.2      27 Aug 2023 04:30  Mg     3.0     08-27    TPro  6.1  /  Alb  2.9<L>  /  TBili  0.3  /  DBili  <0.2  /  AST  22  /  ALT  6   /  AlkPhos  62  08-27    D-Dimer Assay, Quantitative: 479 ng/mL DDU (08-21-23 @ 11:54)  Ferritin: 313 ng/mL [13 - 330] (08-21-23 @ 11:54)  Procalcitonin, Serum: 0.18 ng/mL [0.02 - 0.10] (08-21-23 @ 11:54)    a/p    # 86yo F PMHx HTN, breast cancer and lymphoma s/p surgery in 2004, glaucoma, presenting for worsening dizziness and unsteadiness over the past week. Dizziness is described as feeling lightheaded, present at rest, worse whenever she bends down to get something, and in the past few days has forced her to hold onto the walls when she is walking around her house. Patient reports feeling more weakness in her legs but no numbness. pt has dizziness.   , she has abd pain which has improved     # hyperkalemia   chavo on ckd  sp lokelma, also give d50 and insulin and calcium   repeat labs next round   creatinine trend  1.8 (08-27-23 @ 04:30)  1.7 (08-22-23 @ 11:36)    # abd pain, resolved , probably due to constipation     # dizziness. no vertigo, no nystagmus , due to positive orthostatic hypotension,   Orthostatic VS  sp iv fluid     Orthostatic VS was positive, now negative     08-25-23 @ 15:19  Lying BP: Orthostatic BP (Lying Systolic): 109/Orthostatic BP (Lying Diastolic (mm Hg)): 55 HR: Orthostatic Pulse (Heart Rate (beats/min)): 96   Sitting BP: Orthostatic BP (Sitting Systolic): 104/Orthostatic BP (Sitting Diastolic (mm Hg)): 60 HR: Orthostatic Pulse (Heart Rate (beats/min)): 104        sp ivf, repeat orthostatic  dc nifedipine  possible due to dehydration   physical therapy  eval      check echo     # covid infection  not hypoxic  ID notes appreciated  finished 3 days of remdisivir     # hx of lymphoma and breast ca, sp surgery cont outpt followup     # hypertension controlled,     # chavo with HAGMA and mild hyponatremia , possible cystitis   today chavo improved with ivf but still has acidosis,   cont bicarb     # hypokalemia, and hypomagnesemia, ( mag def)   replace mag  check ecg     # anemia of chronic disease, b12, folate, and ferritin wnl   Hemoglobin: 11.3 g/dL (08-24-23 @ 08:40)    #Progress Note Handoff  Pending: anticipate dc in am   Family discussion: chantale pt   Disposition: STR   time spent : 35 min     possible dc in am

## 2023-08-27 NOTE — PROGRESS NOTE ADULT - SUBJECTIVE AND OBJECTIVE BOX
Nephrology progress note    Patient was seen and examined, events over the last 24 h noted .  Cr stable    Allergies:  Bactrim (Hives)  Percocet 5/325 (Other)  penicillin (Short breath; Hives)    Hospital Medications:   MEDICATIONS  (STANDING):  apixaban 5 milliGRAM(s) Oral every 12 hours  budesonide 160 MICROgram(s)/formoterol 4.5 MICROgram(s) Inhaler 2 Puff(s) Inhalation two times a day  chlorhexidine 2% Cloths 1 Application(s) Topical <User Schedule>  famotidine    Tablet 20 milliGRAM(s) Oral daily  metoprolol succinate ER 25 milliGRAM(s) Oral daily  polyethylene glycol 3350 17 Gram(s) Oral two times a day  senna 2 Tablet(s) Oral at bedtime  sodium bicarbonate 1300 milliGRAM(s) Oral three times a day        VITALS:  T(F): 97.4 (08-27-23 @ 12:20), Max: 97.4 (08-27-23 @ 12:20)  HR: 91 (08-27-23 @ 12:20)  BP: 155/80 (08-27-23 @ 12:20)  RR: 18 (08-27-23 @ 12:20)  SpO2: 98% (08-27-23 @ 12:20)  Wt(kg): --        PHYSICAL EXAM:  Constitutional: NAD  HEENT: anicteric sclera, oropharynx clear, MMM  Neck: No JVD  Respiratory: CTAB, no wheezes, rales or rhonchi  Cardiovascular: S1, S2, RRR  Gastrointestinal: BS+, soft, NT/ND  Extremities: No cyanosis or clubbing. No peripheral edema  :  No falcon.   Skin: No rashes    LABS:  08-27    130<L>  |  97<L>  |  74<HH>  ----------------------------<  82  5.3<H>   |  18  |  1.8<H>    Ca    9.2      27 Aug 2023 04:30  Mg     3.0     08-27    TPro  6.1  /  Alb  2.9<L>  /  TBili  0.3  /  DBili  <0.2  /  AST  22  /  ALT  6   /  AlkPhos  62  08-27                          12.8   11.62 )-----------( 524      ( 27 Aug 2023 11:12 )             40.2       Urine Studies:  Urinalysis Basic - ( 27 Aug 2023 04:30 )    Color:  / Appearance:  / SG:  / pH:   Gluc: 82 mg/dL / Ketone:   / Bili:  / Urobili:    Blood:  / Protein:  / Nitrite:    Leuk Esterase:  / RBC:  / WBC    Sq Epi:  / Non Sq Epi:  / Bacteria:         RADIOLOGY & ADDITIONAL STUDIES:

## 2023-08-28 PROCEDURE — 99232 SBSQ HOSP IP/OBS MODERATE 35: CPT

## 2023-08-28 RX ORDER — SODIUM CHLORIDE 9 MG/ML
1000 INJECTION INTRAMUSCULAR; INTRAVENOUS; SUBCUTANEOUS
Refills: 0 | Status: DISCONTINUED | OUTPATIENT
Start: 2023-08-28 | End: 2023-08-29

## 2023-08-28 RX ORDER — SODIUM ZIRCONIUM CYCLOSILICATE 10 G/10G
5 POWDER, FOR SUSPENSION ORAL ONCE
Refills: 0 | Status: COMPLETED | OUTPATIENT
Start: 2023-08-28 | End: 2023-08-28

## 2023-08-28 RX ADMIN — APIXABAN 5 MILLIGRAM(S): 2.5 TABLET, FILM COATED ORAL at 17:28

## 2023-08-28 RX ADMIN — Medication 1300 MILLIGRAM(S): at 21:16

## 2023-08-28 RX ADMIN — APIXABAN 5 MILLIGRAM(S): 2.5 TABLET, FILM COATED ORAL at 05:27

## 2023-08-28 RX ADMIN — SODIUM ZIRCONIUM CYCLOSILICATE 5 GRAM(S): 10 POWDER, FOR SUSPENSION ORAL at 08:30

## 2023-08-28 RX ADMIN — Medication 1300 MILLIGRAM(S): at 13:26

## 2023-08-28 RX ADMIN — SODIUM CHLORIDE 80 MILLILITER(S): 9 INJECTION INTRAMUSCULAR; INTRAVENOUS; SUBCUTANEOUS at 11:16

## 2023-08-28 RX ADMIN — Medication 25 MILLIGRAM(S): at 05:28

## 2023-08-28 RX ADMIN — POLYETHYLENE GLYCOL 3350 17 GRAM(S): 17 POWDER, FOR SOLUTION ORAL at 05:28

## 2023-08-28 RX ADMIN — Medication 1300 MILLIGRAM(S): at 05:28

## 2023-08-28 RX ADMIN — CHLORHEXIDINE GLUCONATE 1 APPLICATION(S): 213 SOLUTION TOPICAL at 06:39

## 2023-08-28 RX ADMIN — FAMOTIDINE 20 MILLIGRAM(S): 10 INJECTION INTRAVENOUS at 11:14

## 2023-08-28 RX ADMIN — BUDESONIDE AND FORMOTEROL FUMARATE DIHYDRATE 2 PUFF(S): 160; 4.5 AEROSOL RESPIRATORY (INHALATION) at 21:00

## 2023-08-28 RX ADMIN — BUDESONIDE AND FORMOTEROL FUMARATE DIHYDRATE 2 PUFF(S): 160; 4.5 AEROSOL RESPIRATORY (INHALATION) at 08:31

## 2023-08-28 NOTE — PROGRESS NOTE ADULT - ASSESSMENT
88yo F PMHx HTN, breast cancer and lymphoma s/p surgery in 2004, glaucoma, presenting for worsening dizziness and unsteadiness over the past week. Dizziness is described as feeling lightheaded, present at rest, worse whenever she bends down to get something, and in the past few days has forced her to hold onto the walls when she is walking around her house. Patient reports feeling more weakness in her legs but no numbness. Abdominal pain has improved. Patient has dizziness.       # hyperkalemia   herman on ckd  sp lokelma 10 mg daily  Patient given   - 25 mL of D50   - insulin 5 units  - calcium carbonate 1000 mg given once   -creatinine trend 1.8 (8/28)  1.9 (08-26-23 @ 06:49)  1.5 (08-21-23 @ 11:54)      # abd pain, resolved , probably due to constipation     # dizziness. no vertigo, no nystagmus , due to positive orthostatic hypotension,     Orthostatic VS  08-25-23 @ 15:19  Lying BP: Orthostatic BP (Lying Systolic): 109/Orthostatic BP (Lying Diastolic (mm Hg)): 55 HR: Orthostatic Pulse (Heart Rate (beats/min)): 96   Sitting BP: Orthostatic BP (Sitting Systolic): 104/Orthostatic BP (Sitting Diastolic (mm Hg)): 60 HR: Orthostatic Pulse (Heart Rate (beats/min)): 104  Standing BP: --/-- HR: --  Site: --   Mode: --  -sp ivf, repeat orthostatic  -dc nifedipine  -possible due to dehydration   -physical therapy  eval      check echo     # COVID infection  - finished 3 days of remdisivir   - Not hypoxic, saturating at 97% on room air    # hx of lymphoma and breast ca, sp surgery cont outpt followup     # hypertension controlled,     # HERMAN with HAGMA and mild hyponatremia , possible cystitis   -today herman improved with ivf but still has acidosis,   -Biicarb increased from twice to three times daily  creatinine trend 1.8 (8/28)  1.1 (08-24-23 @ 08:40)  1.6 (08-21-23 @ 06:40)    # Resolution of in hospital episodes: hypokalemia, and hypomagnesemia  - Last measurements: Mg 3.2 on 08-26-23 and K 6.2      # anemia of chronic disease, b12, folate, and ferritin wnl   Hemoglobin: 11.3 g/dL (08-24-23 @ 08:40)             86yo F PMHx HTN, breast cancer and lymphoma s/p surgery in 2004, glaucoma, presenting for worsening dizziness and unsteadiness over the past week. Dizziness is described as feeling lightheaded, present at rest, worse whenever she bends down to get something, and in the past few days has forced her to hold onto the walls when she is walking around her house. Patient reports feeling more weakness in her legs but no numbness. Abdominal pain has improved. Patient has dizziness.       # hyperkalemia   herman on ckd  sp lokelma 10 mg daily  Patient given   - 25 mL of D50   - insulin 5 units  - calcium carbonate 1000 mg given once   -creatinine trend 1.8 (8/28)  1.9 (08-26-23 @ 06:49)  1.5 (08-21-23 @ 11:54)      # abd pain, resolved , probably due to constipation     # dizziness. no vertigo, no nystagmus , due to positive orthostatic hypotension,   Orthostatic VS  08-25-23 @ 15:19  Lying BP: Orthostatic BP (Lying Systolic): 109/Orthostatic BP (Lying Diastolic (mm Hg)): 55 HR: Orthostatic Pulse (Heart Rate (beats/min)): 96   Sitting BP: Orthostatic BP (Sitting Systolic): 104/Orthostatic BP (Sitting Diastolic (mm Hg)): 60 HR: Orthostatic Pulse (Heart Rate (beats/min)): 104  Standing BP: --/-- HR: --  Site: --   Mode: --  -sp ivf, repeat orthostatic  -dc nifedipine  -possible due to dehydration   -physical therapy  eval      check echo     # COVID infection  - finished 3 days of remdisivir   - Not hypoxic, saturating at 97% on room air    # hx of lymphoma and breast ca, sp surgery cont outpt followup     # hypertension controlled,     # HERMAN with HAGMA and mild hyponatremia , possible cystitis   -today herman improved with ivf but still has acidosis,   -Biicarb increased from twice to three times daily  creatinine trend 1.8 (8/28)  1.1 (08-24-23 @ 08:40)  1.6 (08-21-23 @ 06:40)    # Resolution of in hospital episodes: hypokalemia, and hypomagnesemia  - Last measurements: Mg 3.2 on 08-26-23 and K 6.2      # anemia of chronic disease, b12, folate, and ferritin wnl   Hemoglobin: 11.3 g/dL (08-24-23 @ 08:40)

## 2023-08-28 NOTE — PROGRESS NOTE ADULT - SUBJECTIVE AND OBJECTIVE BOX
Nephrology progress note    THIS IS AN INCOMPLETE NOTE . FULL NOTE TO FOLLOW SHORTLY    Patient is seen and examined, events over the last 24 h noted .    Allergies:  Bactrim (Hives)  Percocet 5/325 (Other)  penicillin (Short breath; Hives)    Hospital Medications:   MEDICATIONS  (STANDING):  apixaban 5 milliGRAM(s) Oral every 12 hours  budesonide 160 MICROgram(s)/formoterol 4.5 MICROgram(s) Inhaler 2 Puff(s) Inhalation two times a day  chlorhexidine 2% Cloths 1 Application(s) Topical <User Schedule>  famotidine    Tablet 20 milliGRAM(s) Oral daily  metoprolol succinate ER 25 milliGRAM(s) Oral daily  polyethylene glycol 3350 17 Gram(s) Oral two times a day  senna 2 Tablet(s) Oral at bedtime  sodium bicarbonate 1300 milliGRAM(s) Oral three times a day  sodium chloride 0.9%. 1000 milliLiter(s) (80 mL/Hr) IV Continuous <Continuous>        VITALS:  T(F): 98 (08-28-23 @ 05:23), Max: 98 (08-28-23 @ 05:23)  HR: 89 (08-28-23 @ 05:23)  BP: 124/78 (08-28-23 @ 05:23)  RR: 18 (08-28-23 @ 05:23)  SpO2: 99% (08-28-23 @ 07:28)  Wt(kg): --        PHYSICAL EXAM:  Constitutional: NAD  HEENT: anicteric sclera, oropharynx clear, MMM  Neck: No JVD  Respiratory: CTAB, no wheezes, rales or rhonchi  Cardiovascular: S1, S2, RRR  Gastrointestinal: BS+, soft, NT/ND  Extremities: No cyanosis or clubbing. No peripheral edema  :  No falcon.   Skin: No rashes    LABS:  08-27    133<L>  |  98  |  70<HH>  ----------------------------<  99  5.3<H>   |  18  |  1.8<H>    Ca    9.4      27 Aug 2023 11:12  Mg     3.0     08-27    TPro  6.2  /  Alb  3.0<L>  /  TBili  0.3  /  DBili      /  AST  20  /  ALT  6   /  AlkPhos  60  08-27                          12.8   11.62 )-----------( 524      ( 27 Aug 2023 11:12 )             40.2       Urine Studies:  Urinalysis Basic - ( 27 Aug 2023 11:12 )    Color:  / Appearance:  / SG:  / pH:   Gluc: 99 mg/dL / Ketone:   / Bili:  / Urobili:    Blood:  / Protein:  / Nitrite:    Leuk Esterase:  / RBC:  / WBC    Sq Epi:  / Non Sq Epi:  / Bacteria:           Ferritin 313      [08-21-23 @ 11:54]          RADIOLOGY & ADDITIONAL STUDIES:   Nephrology progress note    Patient is seen and examined, events over the last 24 h noted .  lying in bed     Allergies:  Bactrim (Hives)  Percocet 5/325 (Other)  penicillin (Short breath; Hives)    Hospital Medications:   MEDICATIONS  (STANDING):  apixaban 5 milliGRAM(s) Oral every 12 hours  budesonide 160 MICROgram(s)/formoterol 4.5 MICROgram(s) Inhaler 2 Puff(s) Inhalation two times a day  chlorhexidine 2% Cloths 1 Application(s) Topical <User Schedule>  famotidine    Tablet 20 milliGRAM(s) Oral daily  metoprolol succinate ER 25 milliGRAM(s) Oral daily  polyethylene glycol 3350 17 Gram(s) Oral two times a day  senna 2 Tablet(s) Oral at bedtime  sodium bicarbonate 1300 milliGRAM(s) Oral three times a day  sodium chloride 0.9%. 1000 milliLiter(s) (80 mL/Hr) IV Continuous <Continuous>        VITALS:  T(F): 98 (08-28-23 @ 05:23), Max: 98 (08-28-23 @ 05:23)  HR: 89 (08-28-23 @ 05:23)  BP: 124/78 (08-28-23 @ 05:23)  RR: 18 (08-28-23 @ 05:23)  SpO2: 99% (08-28-23 @ 07:28)        PHYSICAL EXAM:  Constitutional: NAD  Neck: No JVD  Respiratory: CTAB,   Cardiovascular: S1, S2, RRR  Gastrointestinal: BS+, soft, NT/ND  Extremities: No cyanosis or clubbing. No peripheral edema  :  No falcon.   Skin: No rashes    LABS:    Creatinine Trend: 1.8<--, 1.8<--, 1.9<--, 1.7<--, 1.9<--, 1.1<-    08-27    133<L>  |  98  |  70<HH>  ----------------------------<  99  5.3<H>   |  18  |  1.8<H>    Ca    9.4      27 Aug 2023 11:12  Mg     3.0     08-27    TPro  6.2  /  Alb  3.0<L>  /  TBili  0.3  /  DBili      /  AST  20  /  ALT  6   /  AlkPhos  60  08-27                          12.8   11.62 )-----------( 524      ( 27 Aug 2023 11:12 )             40.2       Urine Studies:  Urinalysis Basic - ( 27 Aug 2023 11:12 )    Color:  / Appearance:  / SG:  / pH:   Gluc: 99 mg/dL / Ketone:   / Bili:  / Urobili:    Blood:  / Protein:  / Nitrite:    Leuk Esterase:  / RBC:  / WBC    Sq Epi:  / Non Sq Epi:  / Bacteria:           Ferritin 313      [08-21-23 @ 11:54]          RADIOLOGY & ADDITIONAL STUDIES:

## 2023-08-28 NOTE — PROGRESS NOTE ADULT - ASSESSMENT
86 yo F PMHx HTN, breast cancer and lymphoma s/p surgery in 2004, glaucoma, presenting for worsening dizziness and unsteadiness over the past week. COVID +ve on 8/20. Nephrology was consulted as the patient had an HERMAN  HERMAN on CKD stage 3 B (from pre-renal )    - On treatment for UTI and COVID  - Cr stable / trend noted   - check bladder scan  - cont sodium bicarbonate 650 mgo q 8  - cont  lokelma 10 q 24 D sindy K < 4.56   - check TSH/ cortisol level   - no further renal work up for now

## 2023-08-28 NOTE — PROGRESS NOTE ADULT - SUBJECTIVE AND OBJECTIVE BOX
pt seen and examined.     Resident's notes reviewed, My notes supersede resident's notes in case of discrepancy       ROS: no cp, no sob, no n/v, no fever    Vital Signs Last 24 Hrs  T(C): 36.7 (28 Aug 2023 05:23), Max: 36.7 (28 Aug 2023 05:23)  T(F): 98 (28 Aug 2023 05:23), Max: 98 (28 Aug 2023 05:23)  HR: 89 (28 Aug 2023 05:23) (68 - 91)  BP: 124/78 (28 Aug 2023 05:23) (124/78 - 155/80)  BP(mean): 108 (27 Aug 2023 12:20) (108 - 108)  RR: 18 (28 Aug 2023 05:23) (18 - 18)  SpO2: 99% (28 Aug 2023 07:28) (98% - 99%)    Parameters below as of 28 Aug 2023 07:28  Patient On (Oxygen Delivery Method): room air    physical exam  constitutional NAD, AAOX3, Respiratory  lungs CTA, CVS heart RRR, GI: abdomen Soft NT, ND, BS+, skin: intact  neuro exam Motor, sensory and CN normal, no deficit     MEDICATIONS  (STANDING):  apixaban 5 milliGRAM(s) Oral every 12 hours  budesonide 160 MICROgram(s)/formoterol 4.5 MICROgram(s) Inhaler 2 Puff(s) Inhalation two times a day  chlorhexidine 2% Cloths 1 Application(s) Topical <User Schedule>  famotidine    Tablet 20 milliGRAM(s) Oral daily  metoprolol succinate ER 25 milliGRAM(s) Oral daily  polyethylene glycol 3350 17 Gram(s) Oral two times a day  senna 2 Tablet(s) Oral at bedtime  sodium bicarbonate 1300 milliGRAM(s) Oral three times a day  sodium chloride 0.9%. 1000 milliLiter(s) (80 mL/Hr) IV Continuous <Continuous>    MEDICATIONS  (PRN):  acetaminophen     Tablet .. 650 milliGRAM(s) Oral every 6 hours PRN Temp greater or equal to 38C (100.4F), Mild Pain (1 - 3), Moderate Pain (4 - 6), Severe Pain (7 - 10)  meclizine 12.5 milliGRAM(s) Oral two times a day PRN Dizziness                        12.8   11.62 )-----------( 524      ( 27 Aug 2023 11:12 )             40.2     08-27    133<L>  |  98  |  70<HH>  ----------------------------<  99  5.3<H>   |  18  |  1.8<H>    Ca    9.4      27 Aug 2023 11:12  Mg     3.0     08-27    TPro  6.2  /  Alb  3.0<L>  /  TBili  0.3  /  DBili  x   /  AST  20  /  ALT  6   /  AlkPhos  60  08-27    D-Dimer Assay, Quantitative: 479 ng/mL DDU (08-21-23 @ 11:54)  Ferritin: 313 ng/mL [13 - 330] (08-21-23 @ 11:54)  Procalcitonin, Serum: 0.18 ng/mL [0.02 - 0.10] (08-21-23 @ 11:54)    a/p    88yo F PMHx HTN, breast cancer and lymphoma s/p surgery in 2004, glaucoma, presenting for worsening dizziness and unsteadiness over the past week. Dizziness is described as feeling lightheaded, present at rest, worse whenever she bends down to get something, and in the past few days has forced her to hold onto the walls when she is walking around her house. Patient reports feeling more weakness in her legs but no numbness. pt has dizziness.   , she has abd pain which has improved     # hyperkalemia   chavo on ckd  sp lokelma, also give d50 and insulin and calcium   repeat labs next round   creatinine trend  1.8 (08-27-23 @ 04:30)  1.7 (08-22-23 @ 11:36)    # abd pain, resolved , probably due to constipation     # dizziness. no vertigo, no nystagmus , due to positive orthostatic hypotension,   Orthostatic VS  sp iv fluid     Orthostatic VS was positive, now negative     08-25-23 @ 15:19  Lying BP: Orthostatic BP (Lying Systolic): 109/Orthostatic BP (Lying Diastolic (mm Hg)): 55 HR: Orthostatic Pulse (Heart Rate (beats/min)): 96   Sitting BP: Orthostatic BP (Sitting Systolic): 104/Orthostatic BP (Sitting Diastolic (mm Hg)): 60 HR: Orthostatic Pulse (Heart Rate (beats/min)): 104        sp ivf, repeat orthostatic  dc nifedipine  possible due to dehydration   physical therapy  eval      check echo     # covid infection  not hypoxic  ID notes appreciated  finished 3 days of remdisivir     # hx of lymphoma and breast ca, sp surgery cont outpt followup     # hypertension controlled,     # chavo on ckd with HAGMA and mild hyponatremia ,     cont bicarb   creatinine trend  1.8 (08-27-23 @ 11:12)  1.1 (08-24-23 @ 08:40)  Creatinine: 1.6 mg/dL (08.19.23 @ 06:28)    has increase bun   IVF started     # anemia of chronic disease, b12, folate, and ferritin wnl  ,   Hemoglobin: 12.8 g/dL (08-27-23 @ 11:12)  Hemoglobin: 13.7 g/dL (08-27-23 @ 07:57)  Hemoglobin: 13.9 g/dL (08-26-23 @ 06:49)  Hemoglobin: 14.2 g/dL (08-25-23 @ 11:27)    #Progress Note Handoff  Pending: bun to improve,   Family discussion: dw pt   Disposition: STR   time spent : 35 min

## 2023-08-28 NOTE — PROGRESS NOTE ADULT - SUBJECTIVE AND OBJECTIVE BOX
24H events:    Patient is a 87y old Female who presents with a chief complaint of Dizziness    Primary diagnosis of Dizziness        Today is hospital day 13d. This morning patient was seen and examined at bedside, resting comfortably in bed.    No acute or major events overnight. Hemodynamically stable, tolerating oral diet, voiding appropriately with appropriate bowel movements.     Code Status:    Family communication:  Contact date: 08.28.23  Name of person contacted: Elva Schwarz  Relationship to patient: Niece  Communication details: Follow up on her respiratory and urinary infection and on current HERMAN    PAST MEDICAL & SURGICAL HISTORY  - Prolapsed uterus   - Hernia  - HTN (hypertension)  - GERD (gastroesophageal reflux disease)  - Breast CA  - No significant past surgical history      SOCIAL HISTORY:  Social History:  Lives alone in [place] (15 Aug 2023 15:49)      ALLERGIES:  Bactrim (Hives)  penicillin (Short breath; Hives)    MEDICATIONS:  MEDICATIONS  (STANDING):  apixaban 5 milliGRAM(s) Oral every 12 hours  budesonide 160 MICROgram(s)/formoterol 4.5 MICROgram(s) Inhaler 2 Puff(s) Inhalation two times a day  chlorhexidine 2% Cloths 1 Application(s) Topical <User Schedule>  famotidine    Tablet 20 milliGRAM(s) Oral daily  metoprolol succinate ER 25 milliGRAM(s) Oral daily  polyethylene glycol 3350 17 Gram(s) Oral two times a day  senna 2 Tablet(s) Oral at bedtime  sodium bicarbonate 1300 milliGRAM(s) Oral three times a day  sodium chloride 0.9%. 1000 milliLiter(s) (80 mL/Hr) IV Continuous <Continuous>    MEDICATIONS  (PRN):  acetaminophen     Tablet .. 650 milliGRAM(s) Oral every 6 hours PRN Temp greater or equal to 38C (100.4F), Mild Pain (1 - 3), Moderate Pain (4 - 6), Severe Pain (7 - 10)  meclizine 12.5 milliGRAM(s) Oral two times a day PRN Dizziness    VITALS:   T(C): 35.6 (28 Aug 2023 13:15), Max: 36.7 (28 Aug 2023 05:23)  T(F): 96 (28 Aug 2023 13:15), Max: 98 (28 Aug 2023 05:23)  HR: 88 (28 Aug 2023 13:15) (68 - 94)  BP: 121/68 (28 Aug 2023 13:15) (118/68 - 129/69)  BP(mean): --  RR: 18 (28 Aug 2023 13:15) (18 - 18)  SpO2: 92% (28 Aug 2023 13:15) (92% - 99%)    Parameters below as of 28 Aug 2023 07:28  Patient On (Oxygen Delivery Method): room air        PHYSICAL EXAM:  GENERAL: NAD, lying in bed comfortably and cooperative  NECK: Supple, no neck stiffness/nuchal rigidity, no JVD    HEART: Regular rate and rhythm, normal S1/S2   LUNGS: No acute respiratory distress   ABDOMEN:  soft, non-tender, non-distented   EXTREMITIES: no rashes, extremities warm/dry, 1+ edema bilateraly  NERVOUS SYSTEM:  A&Ox3, CNII-XII intace, follows commands, answers questions appropriately   SKIN: No rashes or lesions         LABS:     LABS:                          12.8   11.62 )-----------( 524      ( 27 Aug 2023 11:12 )             40.2     08-27    133<L>  |  98  |  70<HH>  ----------------------------<  99  5.3<H>   |  18  |  1.8<H>    Ca    9.4      27 Aug 2023 11:12  Mg     3.0     08-27    TPro  6.2  /  Alb  3.0<L>  /  TBili  0.3  /  DBili  x   /  AST  20  /  ALT  6   /  AlkPhos  60  08-27    LIVER FUNCTIONS - ( 27 Aug 2023 11:12 )  Alb: 3.0 g/dL / Pro: 6.2 g/dL / ALK PHOS: 60 U/L / ALT: 6 U/L / AST: 20 U/L / GGT: x             Urinalysis Basic - ( 27 Aug 2023 11:12 )    Color: x / Appearance: x / SG: x / pH: x  Gluc: 99 mg/dL / Ketone: x  / Bili: x / Urobili: x   Blood: x / Protein: x / Nitrite: x   Leuk Esterase: x / RBC: x / WBC x   Sq Epi: x / Non Sq Epi: x / Bacteria: x

## 2023-08-29 ENCOUNTER — TRANSCRIPTION ENCOUNTER (OUTPATIENT)
Age: 88
End: 2023-08-29

## 2023-08-29 VITALS
DIASTOLIC BLOOD PRESSURE: 73 MMHG | HEART RATE: 79 BPM | TEMPERATURE: 96 F | OXYGEN SATURATION: 98 % | SYSTOLIC BLOOD PRESSURE: 136 MMHG | RESPIRATION RATE: 18 BRPM

## 2023-08-29 LAB
ALBUMIN SERPL ELPH-MCNC: 1.9 G/DL — LOW (ref 3.5–5.2)
ALBUMIN SERPL ELPH-MCNC: 1.9 G/DL — LOW (ref 3.5–5.2)
ALBUMIN SERPL ELPH-MCNC: 2.8 G/DL — LOW (ref 3.5–5.2)
ALP SERPL-CCNC: 41 U/L — SIGNIFICANT CHANGE UP (ref 30–115)
ALP SERPL-CCNC: 42 U/L — SIGNIFICANT CHANGE UP (ref 30–115)
ALP SERPL-CCNC: 56 U/L — SIGNIFICANT CHANGE UP (ref 30–115)
ALT FLD-CCNC: 6 U/L — SIGNIFICANT CHANGE UP (ref 0–41)
ALT FLD-CCNC: <5 U/L — SIGNIFICANT CHANGE UP (ref 0–41)
ALT FLD-CCNC: <5 U/L — SIGNIFICANT CHANGE UP (ref 0–41)
ANION GAP SERPL CALC-SCNC: 10 MMOL/L — SIGNIFICANT CHANGE UP (ref 7–14)
ANION GAP SERPL CALC-SCNC: 13 MMOL/L — SIGNIFICANT CHANGE UP (ref 7–14)
APPEARANCE UR: ABNORMAL
AST SERPL-CCNC: 13 U/L — SIGNIFICANT CHANGE UP (ref 0–41)
AST SERPL-CCNC: 14 U/L — SIGNIFICANT CHANGE UP (ref 0–41)
AST SERPL-CCNC: 19 U/L — SIGNIFICANT CHANGE UP (ref 0–41)
BACTERIA # UR AUTO: ABNORMAL /HPF
BASOPHILS # BLD AUTO: 0 K/UL — SIGNIFICANT CHANGE UP (ref 0–0.2)
BASOPHILS NFR BLD AUTO: 0 % — SIGNIFICANT CHANGE UP (ref 0–1)
BILIRUB DIRECT SERPL-MCNC: <0.2 MG/DL — SIGNIFICANT CHANGE UP (ref 0–0.3)
BILIRUB INDIRECT FLD-MCNC: >0 MG/DL — LOW (ref 0.2–1.2)
BILIRUB SERPL-MCNC: 0.2 MG/DL — SIGNIFICANT CHANGE UP (ref 0.2–1.2)
BILIRUB UR-MCNC: NEGATIVE — SIGNIFICANT CHANGE UP
BLD GP AB SCN SERPL QL: SIGNIFICANT CHANGE UP
BUN SERPL-MCNC: 50 MG/DL — HIGH (ref 10–20)
BUN SERPL-MCNC: 62 MG/DL — CRITICAL HIGH (ref 10–20)
CALCIUM SERPL-MCNC: 6.3 MG/DL — LOW (ref 8.4–10.4)
CALCIUM SERPL-MCNC: 8.7 MG/DL — SIGNIFICANT CHANGE UP (ref 8.4–10.5)
CAST: 1 /LPF — SIGNIFICANT CHANGE UP (ref 0–4)
CHLORIDE SERPL-SCNC: 112 MMOL/L — HIGH (ref 98–110)
CHLORIDE SERPL-SCNC: 99 MMOL/L — SIGNIFICANT CHANGE UP (ref 98–110)
CO2 SERPL-SCNC: 17 MMOL/L — SIGNIFICANT CHANGE UP (ref 17–32)
CO2 SERPL-SCNC: 22 MMOL/L — SIGNIFICANT CHANGE UP (ref 17–32)
COLOR SPEC: YELLOW — SIGNIFICANT CHANGE UP
CORTIS AM PEAK SERPL-MCNC: 16.4 UG/DL — SIGNIFICANT CHANGE UP (ref 6–18.4)
CREAT SERPL-MCNC: 1.2 MG/DL — SIGNIFICANT CHANGE UP (ref 0.7–1.5)
CREAT SERPL-MCNC: 1.6 MG/DL — HIGH (ref 0.7–1.5)
DIFF PNL FLD: ABNORMAL
EGFR: 31 ML/MIN/1.73M2 — LOW
EGFR: 44 ML/MIN/1.73M2 — LOW
EOSINOPHIL # BLD AUTO: 0 K/UL — SIGNIFICANT CHANGE UP (ref 0–0.7)
EOSINOPHIL NFR BLD AUTO: 0 % — SIGNIFICANT CHANGE UP (ref 0–8)
GLUCOSE SERPL-MCNC: 176 MG/DL — HIGH (ref 70–99)
GLUCOSE SERPL-MCNC: 79 MG/DL — SIGNIFICANT CHANGE UP (ref 70–99)
GLUCOSE UR QL: NEGATIVE MG/DL — SIGNIFICANT CHANGE UP
HCT VFR BLD CALC: 33 % — LOW (ref 37–47)
HGB BLD-MCNC: 10.4 G/DL — LOW (ref 12–16)
HYALINE CASTS # UR AUTO: 1 /LPF — SIGNIFICANT CHANGE UP (ref 0–4)
KETONES UR-MCNC: NEGATIVE MG/DL — SIGNIFICANT CHANGE UP
LEUKOCYTE ESTERASE UR-ACNC: ABNORMAL
LYMPHOCYTES # BLD AUTO: 1.13 K/UL — LOW (ref 1.2–3.4)
LYMPHOCYTES # BLD AUTO: 10.4 % — LOW (ref 20.5–51.1)
MAGNESIUM SERPL-MCNC: 1.7 MG/DL — LOW (ref 1.8–2.4)
MAGNESIUM SERPL-MCNC: 2.3 MG/DL — SIGNIFICANT CHANGE UP (ref 1.8–2.4)
MCHC RBC-ENTMCNC: 27.7 PG — SIGNIFICANT CHANGE UP (ref 27–31)
MCHC RBC-ENTMCNC: 31.5 G/DL — LOW (ref 32–37)
MCV RBC AUTO: 87.8 FL — SIGNIFICANT CHANGE UP (ref 81–99)
MONOCYTES # BLD AUTO: 0.75 K/UL — HIGH (ref 0.1–0.6)
MONOCYTES NFR BLD AUTO: 6.9 % — SIGNIFICANT CHANGE UP (ref 1.7–9.3)
NEUTROPHILS # BLD AUTO: 8.47 K/UL — HIGH (ref 1.4–6.5)
NEUTROPHILS NFR BLD AUTO: 78.3 % — HIGH (ref 42.2–75.2)
NITRITE UR-MCNC: NEGATIVE — SIGNIFICANT CHANGE UP
OSMOLALITY UR: 318 MOS/KG — SIGNIFICANT CHANGE UP (ref 50–1200)
PH UR: 5.5 — SIGNIFICANT CHANGE UP (ref 5–8)
PHOSPHATE SERPL-MCNC: 3 MG/DL — SIGNIFICANT CHANGE UP (ref 2.1–4.9)
PLATELET # BLD AUTO: 498 K/UL — HIGH (ref 130–400)
PMV BLD: 10.1 FL — SIGNIFICANT CHANGE UP (ref 7.4–10.4)
POTASSIUM SERPL-MCNC: 3.5 MMOL/L — SIGNIFICANT CHANGE UP (ref 3.5–5)
POTASSIUM SERPL-MCNC: 4.8 MMOL/L — SIGNIFICANT CHANGE UP (ref 3.5–5)
POTASSIUM SERPL-SCNC: 3.5 MMOL/L — SIGNIFICANT CHANGE UP (ref 3.5–5)
POTASSIUM SERPL-SCNC: 4.8 MMOL/L — SIGNIFICANT CHANGE UP (ref 3.5–5)
POTASSIUM UR-SCNC: 15 MMOL/L — SIGNIFICANT CHANGE UP
PROT SERPL-MCNC: 4.1 G/DL — LOW (ref 6–8)
PROT SERPL-MCNC: 4.2 G/DL — LOW (ref 6–8)
PROT SERPL-MCNC: 5.7 G/DL — LOW (ref 6–8)
PROT UR-MCNC: 30 MG/DL
RBC # BLD: 3.76 M/UL — LOW (ref 4.2–5.4)
RBC # FLD: 14.4 % — SIGNIFICANT CHANGE UP (ref 11.5–14.5)
RBC CASTS # UR COMP ASSIST: 14 /HPF — HIGH (ref 0–4)
SODIUM SERPL-SCNC: 134 MMOL/L — LOW (ref 135–146)
SODIUM SERPL-SCNC: 139 MMOL/L — SIGNIFICANT CHANGE UP (ref 135–146)
SODIUM UR-SCNC: 49 MMOL/L — SIGNIFICANT CHANGE UP
SP GR SPEC: 1.01 — SIGNIFICANT CHANGE UP (ref 1–1.03)
SQUAMOUS # UR AUTO: 9 /HPF — HIGH (ref 0–5)
UROBILINOGEN FLD QL: 0.2 MG/DL — SIGNIFICANT CHANGE UP (ref 0.2–1)
WBC # BLD: 10.82 K/UL — HIGH (ref 4.8–10.8)
WBC # FLD AUTO: 10.82 K/UL — HIGH (ref 4.8–10.8)
WBC UR QL: >998 /HPF — HIGH (ref 0–5)

## 2023-08-29 PROCEDURE — 99232 SBSQ HOSP IP/OBS MODERATE 35: CPT

## 2023-08-29 RX ORDER — NIFEDIPINE 30 MG
1 TABLET, EXTENDED RELEASE 24 HR ORAL
Refills: 0 | DISCHARGE

## 2023-08-29 RX ORDER — METHYLPREDNISOLONE 4 MG
1 TABLET ORAL
Qty: 0 | Refills: 0 | DISCHARGE

## 2023-08-29 RX ORDER — SENNA PLUS 8.6 MG/1
2 TABLET ORAL
Qty: 0 | Refills: 0 | DISCHARGE
Start: 2023-08-29

## 2023-08-29 RX ORDER — BUDESONIDE AND FORMOTEROL FUMARATE DIHYDRATE 160; 4.5 UG/1; UG/1
2 AEROSOL RESPIRATORY (INHALATION)
Qty: 0 | Refills: 0 | DISCHARGE
Start: 2023-08-29

## 2023-08-29 RX ORDER — APIXABAN 2.5 MG/1
1 TABLET, FILM COATED ORAL
Qty: 0 | Refills: 0 | DISCHARGE
Start: 2023-08-29

## 2023-08-29 RX ORDER — ACETAMINOPHEN 500 MG
2 TABLET ORAL
Qty: 0 | Refills: 0 | DISCHARGE
Start: 2023-08-29

## 2023-08-29 RX ORDER — MECLIZINE HCL 12.5 MG
1 TABLET ORAL
Qty: 0 | Refills: 0 | DISCHARGE
Start: 2023-08-29

## 2023-08-29 RX ORDER — SODIUM BICARBONATE 1 MEQ/ML
2 SYRINGE (ML) INTRAVENOUS
Qty: 0 | Refills: 0 | DISCHARGE
Start: 2023-08-29

## 2023-08-29 RX ORDER — POLYETHYLENE GLYCOL 3350 17 G/17G
17 POWDER, FOR SOLUTION ORAL
Qty: 0 | Refills: 0 | DISCHARGE
Start: 2023-08-29

## 2023-08-29 RX ADMIN — CHLORHEXIDINE GLUCONATE 1 APPLICATION(S): 213 SOLUTION TOPICAL at 05:34

## 2023-08-29 RX ADMIN — APIXABAN 5 MILLIGRAM(S): 2.5 TABLET, FILM COATED ORAL at 05:27

## 2023-08-29 RX ADMIN — APIXABAN 5 MILLIGRAM(S): 2.5 TABLET, FILM COATED ORAL at 17:31

## 2023-08-29 RX ADMIN — Medication 650 MILLIGRAM(S): at 07:53

## 2023-08-29 RX ADMIN — POLYETHYLENE GLYCOL 3350 17 GRAM(S): 17 POWDER, FOR SOLUTION ORAL at 17:31

## 2023-08-29 RX ADMIN — Medication 650 MILLIGRAM(S): at 08:53

## 2023-08-29 RX ADMIN — FAMOTIDINE 20 MILLIGRAM(S): 10 INJECTION INTRAVENOUS at 11:28

## 2023-08-29 RX ADMIN — Medication 1300 MILLIGRAM(S): at 05:28

## 2023-08-29 RX ADMIN — BUDESONIDE AND FORMOTEROL FUMARATE DIHYDRATE 2 PUFF(S): 160; 4.5 AEROSOL RESPIRATORY (INHALATION) at 07:54

## 2023-08-29 RX ADMIN — Medication 1300 MILLIGRAM(S): at 13:21

## 2023-08-29 RX ADMIN — Medication 25 MILLIGRAM(S): at 05:27

## 2023-08-29 NOTE — PROGRESS NOTE ADULT - ASSESSMENT
86yo F PMHx HTN, breast cancer and lymphoma s/p surgery in 2004, glaucoma, presenting for worsening dizziness and unsteadiness over the past week. Dizziness is described as feeling lightheaded, present at rest, worse whenever she bends down to get something, and in the past few days has forced her to hold onto the walls when she is walking around her house. Patient reports feeling more weakness in her legs but no numbness. Abdominal pain has improved. Patient has dizziness.     #HERMAN with HAGMA on CKD stage 3A   - HERMAN improved but persistent hyperkalemia/hypornatremia/acidosis  -sp lokelma 10 mg daily  -Patient given  25 mL of D50 , insulin 5 units,  calcium carbonate 1000 mg given once   -Biicarb increased from twice to three times daily  -creatinine trend 1.8 (8/28)  -f/u cortisol levels  -TSH wnl  -on IVF ns    # abd pain, resolved  - probably due to constipation     #Orthostatic hypotension  -dizziness, no vertigo, no nystagmus  08-25-23 @ 15:19  Lying BP: Orthostatic BP (Lying Systolic): 109/Orthostatic BP (Lying Diastolic (mm Hg)): 55 HR: Orthostatic Pulse (Heart Rate (beats/min)): 96   Sitting BP: Orthostatic BP (Sitting Systolic): 104/Orthostatic BP (Sitting Diastolic (mm Hg)): 60 HR: Orthostatic Pulse (Heart Rate (beats/min)): 104  Orthostatic VS  -repeat orthostatic  -dc nifedipine  -possible due to dehydration, on ivf  -physical therapy  eval     # COVID infection  - finished 3 days of remdisivir   - Not hypoxic, saturating at 97% on room air    #hx of hypertension  -was on nifedipine,   -d/c nifedipine d/t orthostatic hypotension  -blood pressure controlled    #UTI possible cystitis   -Possible cystitis. No pyelonephritis  -8/19 UCx E coli  -8/18 BCx NG  -completed macrobid    # hx of lymphoma and breast ca, sp surgery cont outpt followup     #dispo: acute  #Pending:  f/u cortisol, bmp

## 2023-08-29 NOTE — DISCHARGE NOTE NURSING/CASE MANAGEMENT/SOCIAL WORK - PATIENT PORTAL LINK FT
You can access the FollowMyHealth Patient Portal offered by [facility] by registering at the following website: [email or web address]. By joining [employer]’s FollowMyHealth portal, you will also be able to view your health information using other applications (apps) compatible with our system.

## 2023-08-29 NOTE — PROGRESS NOTE ADULT - PROVIDER SPECIALTY LIST ADULT
Hospitalist
Internal Medicine
Nephrology
Nephrology
Hospitalist
Internal Medicine
Nephrology
Hospitalist
Internal Medicine
Nephrology
Nephrology

## 2023-08-29 NOTE — PROGRESS NOTE ADULT - REASON FOR ADMISSION
Dizziness

## 2023-08-29 NOTE — PROGRESS NOTE ADULT - SUBJECTIVE AND OBJECTIVE BOX
pt seen and examined.     Resident's notes reviewed, My notes supersede resident's notes in case of discrepancy       ROS: no cp, no sob, no n/v, no fever    Vital Signs Last 24 Hrs  T(C): 35.8 (29 Aug 2023 13:22), Max: 36.2 (28 Aug 2023 21:00)  T(F): 96.5 (29 Aug 2023 13:22), Max: 97.2 (28 Aug 2023 21:00)  HR: 79 (29 Aug 2023 13:22) (52 - 79)  BP: 136/73 (29 Aug 2023 13:22) (136/73 - 178/79)  BP(mean): --  RR: 18 (29 Aug 2023 13:22) (18 - 18)  SpO2: 98% (29 Aug 2023 13:22) (95% - 98%)        physical exam  constitutional NAD, AAOX3, Respiratory  lungs CTA, CVS heart RRR, GI: abdomen Soft NT, ND, BS+, skin: intact  neuro exam Motor, sensory and CN normal, no deficit     MEDICATIONS  (STANDING):  apixaban 5 milliGRAM(s) Oral every 12 hours  budesonide 160 MICROgram(s)/formoterol 4.5 MICROgram(s) Inhaler 2 Puff(s) Inhalation two times a day  chlorhexidine 2% Cloths 1 Application(s) Topical <User Schedule>  famotidine    Tablet 20 milliGRAM(s) Oral daily  metoprolol succinate ER 25 milliGRAM(s) Oral daily  polyethylene glycol 3350 17 Gram(s) Oral two times a day  senna 2 Tablet(s) Oral at bedtime  sodium bicarbonate 1300 milliGRAM(s) Oral three times a day  sodium chloride 0.9%. 1000 milliLiter(s) (80 mL/Hr) IV Continuous <Continuous>    MEDICATIONS  (PRN):  acetaminophen     Tablet .. 650 milliGRAM(s) Oral every 6 hours PRN Temp greater or equal to 38C (100.4F), Mild Pain (1 - 3), Moderate Pain (4 - 6), Severe Pain (7 - 10)  meclizine 12.5 milliGRAM(s) Oral two times a day PRN Dizziness                        10.4   10.82 )-----------( 498      ( 29 Aug 2023 07:37 )             33.0     08-29    139  |  112<H>  |  50<H>  ----------------------------<  79  3.5   |  17  |  1.2    Ca    6.3<L>      29 Aug 2023 07:37  Phos  3.0     08-29  Mg     1.7     08-29    TPro  4.2<L>  /  Alb  1.9<L>  /  TBili  0.2  /  DBili  <0.2  /  AST  14  /  ALT  <5  /  AlkPhos  42  08-29    D-Dimer Assay, Quantitative: 479 ng/mL DDU (08-21-23 @ 11:54)  Ferritin: 313 ng/mL [13 - 330] (08-21-23 @ 11:54)  Procalcitonin, Serum: 0.18 ng/mL [0.02 - 0.10] (08-21-23 @ 11:54)                               pt seen and examined.     Resident's notes reviewed, My notes supersede resident's notes in case of discrepancy       ROS: no cp, no sob, no n/v, no fever    Vital Signs Last 24 Hrs  T(C): 35.8 (29 Aug 2023 13:22), Max: 36.2 (28 Aug 2023 21:00)  T(F): 96.5 (29 Aug 2023 13:22), Max: 97.2 (28 Aug 2023 21:00)  HR: 79 (29 Aug 2023 13:22) (52 - 79)  BP: 136/73 (29 Aug 2023 13:22) (136/73 - 178/79)  RR: 18 (29 Aug 2023 13:22) (18 - 18)  SpO2: 98% (29 Aug 2023 13:22) (95% - 98%)    physical exam  constitutional NAD, AAOX3, Respiratory  lungs CTA, CVS heart RRR, GI: abdomen Soft NT, ND, BS+, skin: intact  neuro exam Motor, sensory and CN normal, no deficit     MEDICATIONS  (STANDING):  apixaban 5 milliGRAM(s) Oral every 12 hours  budesonide 160 MICROgram(s)/formoterol 4.5 MICROgram(s) Inhaler 2 Puff(s) Inhalation two times a day  chlorhexidine 2% Cloths 1 Application(s) Topical <User Schedule>  famotidine    Tablet 20 milliGRAM(s) Oral daily  metoprolol succinate ER 25 milliGRAM(s) Oral daily  polyethylene glycol 3350 17 Gram(s) Oral two times a day  senna 2 Tablet(s) Oral at bedtime  sodium bicarbonate 1300 milliGRAM(s) Oral three times a day  sodium chloride 0.9%. 1000 milliLiter(s) (80 mL/Hr) IV Continuous <Continuous>    MEDICATIONS  (PRN):  acetaminophen     Tablet .. 650 milliGRAM(s) Oral every 6 hours PRN Temp greater or equal to 38C (100.4F), Mild Pain (1 - 3), Moderate Pain (4 - 6), Severe Pain (7 - 10)  meclizine 12.5 milliGRAM(s) Oral two times a day PRN Dizziness                        10.4   10.82 )-----------( 498      ( 29 Aug 2023 07:37 )             33.0     08-29    139  |  112<H>  |  50<H>  ----------------------------<  79  3.5   |  17  |  1.2    Ca    6.3<L>      29 Aug 2023 07:37  Phos  3.0     08-29  Mg     1.7     08-29    TPro  4.2<L>  /  Alb  1.9<L>  /  TBili  0.2  /  DBili  <0.2  /  AST  14  /  ALT  <5  /  AlkPhos  42  08-29    D-Dimer Assay, Quantitative: 479 ng/mL DDU (08-21-23 @ 11:54)  Ferritin: 313 ng/mL [13 - 330] (08-21-23 @ 11:54)  Procalcitonin, Serum: 0.18 ng/mL [0.02 - 0.10] (08-21-23 @ 11:54)    a/p    86yo F PMHx HTN, breast cancer and lymphoma s/p surgery in 2004, glaucoma, presenting for worsening dizziness and unsteadiness over the past week. Dizziness is described as feeling lightheaded, present at rest, worse whenever she bends down to get something, and in the past few days has forced her to hold onto the walls when she is walking around her house. Patient reports feeling more weakness in her legs but no numbness. pt has dizziness.   , she has abd pain which has improved     # hyperkalemia   chavo on ckd, improved   sp lokelma, also give d50 and insulin and calcium   repeat labs next round   creatinine trend  1.2 (08-29-23 @ 07:37)  1.9 (08-25-23 @ 15:10)    # abd pain, resolved , probably due to constipation     # dizziness. no vertigo, no nystagmus , due to positive orthostatic hypotension,   Orthostatic VS  sp iv fluid     Orthostatic VS was positive, now negative     sp ivf, repeat orthostatic  dc nifedipine  possible due to dehydration   physical therapy  eval      check echo     # covid infection  not hypoxic  ID notes appreciated  finished 3 days of remdisivir     # hx of lymphoma and breast ca, sp surgery cont outpt followup     # hypertension controlled,     # chavo on ckd with HAGMA and mild hyponatremia ,     cont bicarb     has increase bun   IVF started     # anemia of chronic disease, b12, folate, and ferritin wnl  ,   drop in hgb could be dilutional, pt is not bleeding     Hemoglobin: 10.4 g/dL (08-29-23 @ 07:37)  Hemoglobin: 12.8 g/dL (08-27-23 @ 11:12)  Hemoglobin: 13.7 g/dL (08-27-23 @ 07:57)  Hemoglobin: 13.9 g/dL (08-26-23 @ 06:49)    #Progress Note Handoff  Pending: discharge planning , repeat labs   Family discussion: [place] pt   Disposition: STR   time spent : 35 min

## 2023-08-29 NOTE — PROGRESS NOTE ADULT - SUBJECTIVE AND OBJECTIVE BOX
24H events:    Patient is a 88y old Female who presents with a chief complaint of Dizziness (28 Aug 2023 15:44)    Primary diagnosis of Dizziness    Today is hospital day 14d. This morning patient was seen and examined at bedside, resting comfortably in bed.    No acute or major events overnight.    Code Status:    Family communication:  Contact date:  Name of person contacted:  Relationship to patient:  Communication details:  What matters most:    PAST MEDICAL & SURGICAL HISTORY  Prolapsed uterus    Hernia    HTN (hypertension)    GERD (gastroesophageal reflux disease)    Breast CA    No significant past surgical history      SOCIAL HISTORY:  Social History:  Lives alone in [place] (15 Aug 2023 15:49)      ALLERGIES:  Bactrim (Hives)  penicillin (Short breath; Hives)    MEDICATIONS:  STANDING MEDICATIONS  apixaban 5 milliGRAM(s) Oral every 12 hours  budesonide 160 MICROgram(s)/formoterol 4.5 MICROgram(s) Inhaler 2 Puff(s) Inhalation two times a day  chlorhexidine 2% Cloths 1 Application(s) Topical <User Schedule>  famotidine    Tablet 20 milliGRAM(s) Oral daily  metoprolol succinate ER 25 milliGRAM(s) Oral daily  polyethylene glycol 3350 17 Gram(s) Oral two times a day  senna 2 Tablet(s) Oral at bedtime  sodium bicarbonate 1300 milliGRAM(s) Oral three times a day  sodium chloride 0.9%. 1000 milliLiter(s) IV Continuous <Continuous>    PRN MEDICATIONS  acetaminophen     Tablet .. 650 milliGRAM(s) Oral every 6 hours PRN  meclizine 12.5 milliGRAM(s) Oral two times a day PRN    VITALS:   T(F): 97  HR: 74  BP: 178/79  RR: 18  SpO2: 95%    PHYSICAL EXAM:  GENERAL:   ( X ) NAD, lying in bed comfortably     (  ) obtunded     (  ) lethargic     (  ) somnolent    HEAD:   (X  ) Atraumatic     (  ) hematoma     (  ) laceration (specify location:       )     NECK:  ( X ) Supple     (  ) neck stiffness     (  ) nuchal rigidity     (  )  no JVD     (  ) JVD present ( -- cm)    HEART:  Rate -->     (X  ) normal rate     (  ) bradycardic     (  ) tachycardic  Rhythm -->     ( X ) regular     (  ) regularly irregular     (  ) irregularly irregular  Murmurs -->     ( X ) normal s1s2     (  ) systolic murmur     (  ) diastolic murmur     (  ) continuous murmur      (  ) S3 present     (  ) S4 present    LUNGS:   (  )Unlabored respirations     (  ) tachypnea  ( X ) B/L air entry     (  ) decreased breath sounds in:  (location     )    (  X) no adventitious sound     (  ) crackles     (  ) wheezing      (  ) rhonchi      (specify location:       )  (  ) chest wall tenderness (specify location:       )    ABDOMEN:   X(  ) Soft     (  ) tense   |   (X ) nondistended     (  ) distended   |   (  ) +BS     (  ) hypoactive bowel sounds     (  ) hyperactive bowel sounds  ( X ) nontender     (  ) RUQ tenderness     (  ) RLQ tenderness     (  ) LLQ tenderness     (  ) epigastric tenderness     (  ) diffuse tenderness  (  ) Splenomegaly      (  ) Hepatomegaly      (  ) Jaundice     (  ) ecchymosis     EXTREMITIES:  ( X ) Normal     (  ) Rash     (  ) ecchymosis     (  ) varicose veins      (  ) pitting edema     (  ) non-pitting edema   (  ) ulceration     (  ) gangrene:     (location:     )    NERVOUS SYSTEM:    ( X ) A&Ox3     (  ) confused     (  ) lethargic  CN II-XII:     (  ) Intact     (  ) deficits found     (Specify:     )   Upper extremities:     (  ) no sensorimotor deficits     (  ) weakness     (  ) loss of proprioception/vibration     (  ) loss of touch/temperature (specify:    )  Lower extremities:     (  ) no sensorimotor deficits     (  ) weakness     (  ) loss of proprioception/vibration     (  ) loss of touch/temperature (specify:    )    SKIN:   (X  ) No rashes or lesions     (  ) maculopapular rash     (  ) pustules     (  ) vesicles     (  ) ulcer     (  ) ecchymosis     (specify location:     )        (  ) Indwelling Ibrahim Catheter:   Date insterted:    Reason (  ) Critical illness     (  ) urinary retention    (  ) Accurate Ins/Outs Monitoring     (  ) CMO patient    (  ) Central Line:   Date inserted:  Location: (  ) Right IJ     (  ) Left IJ     (  ) Right Fem     (  ) Left Fem    (  ) SPC        (  ) pigtail       (  ) PEG tube       (  ) colostomy       (  ) jejunostomy  (  ) U-Dall    LABS:                        10.4   10.82 )-----------( 498      ( 29 Aug 2023 07:37 )             33.0     08-29    139  |  112<H>  |  50<H>  ----------------------------<  79  3.5   |  17  |  1.2    Ca    6.3<L>      29 Aug 2023 07:37  Phos  3.0     08-29  Mg     1.7     08-29    TPro  4.2<L>  /  Alb  1.9<L>  /  TBili  0.2  /  DBili  <0.2  /  AST  14  /  ALT  <5  /  AlkPhos  42  08-29      Urinalysis Basic - ( 29 Aug 2023 07:37 )    Color: x / Appearance: x / SG: x / pH: x  Gluc: 79 mg/dL / Ketone: x  / Bili: x / Urobili: x   Blood: x / Protein: x / Nitrite: x   Leuk Esterase: x / RBC: x / WBC x   Sq Epi: x / Non Sq Epi: x / Bacteria: x                RADIOLOGY:

## 2023-08-30 LAB
CREAT ?TM UR-MCNC: 41 MG/DL — SIGNIFICANT CHANGE UP
PROT ?TM UR-MCNC: 23 MG/DLG/24H — SIGNIFICANT CHANGE UP
PROT/CREAT UR-RTO: 0.6 RATIO — HIGH (ref 0–0.2)
UUN UR-MCNC: 496 MG/DL — SIGNIFICANT CHANGE UP
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
## 2023-09-04 DIAGNOSIS — Z85.72 PERSONAL HISTORY OF NON-HODGKIN LYMPHOMAS: ICD-10-CM

## 2023-09-04 DIAGNOSIS — K21.9 GASTRO-ESOPHAGEAL REFLUX DISEASE WITHOUT ESOPHAGITIS: ICD-10-CM

## 2023-09-04 DIAGNOSIS — I12.9 HYPERTENSIVE CHRONIC KIDNEY DISEASE WITH STAGE 1 THROUGH STAGE 4 CHRONIC KIDNEY DISEASE, OR UNSPECIFIED CHRONIC KIDNEY DISEASE: ICD-10-CM

## 2023-09-04 DIAGNOSIS — N30.00 ACUTE CYSTITIS WITHOUT HEMATURIA: ICD-10-CM

## 2023-09-04 DIAGNOSIS — Z91.128 PATIENT'S INTENTIONAL UNDERDOSING OF MEDICATION REGIMEN FOR OTHER REASON: ICD-10-CM

## 2023-09-04 DIAGNOSIS — N18.32 CHRONIC KIDNEY DISEASE, STAGE 3B: ICD-10-CM

## 2023-09-04 DIAGNOSIS — Z88.0 ALLERGY STATUS TO PENICILLIN: ICD-10-CM

## 2023-09-04 DIAGNOSIS — J12.82 PNEUMONIA DUE TO CORONAVIRUS DISEASE 2019: ICD-10-CM

## 2023-09-04 DIAGNOSIS — T44.7X6A UNDERDOSING OF BETA-ADRENORECEPTOR ANTAGONISTS, INITIAL ENCOUNTER: ICD-10-CM

## 2023-09-04 DIAGNOSIS — K59.00 CONSTIPATION, UNSPECIFIED: ICD-10-CM

## 2023-09-04 DIAGNOSIS — G31.9 DEGENERATIVE DISEASE OF NERVOUS SYSTEM, UNSPECIFIED: ICD-10-CM

## 2023-09-04 DIAGNOSIS — E87.1 HYPO-OSMOLALITY AND HYPONATREMIA: ICD-10-CM

## 2023-09-04 DIAGNOSIS — E87.20 ACIDOSIS, UNSPECIFIED: ICD-10-CM

## 2023-09-04 DIAGNOSIS — Z88.1 ALLERGY STATUS TO OTHER ANTIBIOTIC AGENTS STATUS: ICD-10-CM

## 2023-09-04 DIAGNOSIS — D63.1 ANEMIA IN CHRONIC KIDNEY DISEASE: ICD-10-CM

## 2023-09-04 DIAGNOSIS — E83.42 HYPOMAGNESEMIA: ICD-10-CM

## 2023-09-04 DIAGNOSIS — N17.9 ACUTE KIDNEY FAILURE, UNSPECIFIED: ICD-10-CM

## 2023-09-04 DIAGNOSIS — H40.9 UNSPECIFIED GLAUCOMA: ICD-10-CM

## 2023-09-04 DIAGNOSIS — B96.20 UNSPECIFIED ESCHERICHIA COLI [E. COLI] AS THE CAUSE OF DISEASES CLASSIFIED ELSEWHERE: ICD-10-CM

## 2023-09-04 DIAGNOSIS — U07.1 COVID-19: ICD-10-CM

## 2023-09-04 DIAGNOSIS — I82.452 ACUTE EMBOLISM AND THROMBOSIS OF LEFT PERONEAL VEIN: ICD-10-CM

## 2023-09-04 DIAGNOSIS — E87.5 HYPERKALEMIA: ICD-10-CM

## 2023-09-04 DIAGNOSIS — Z88.6 ALLERGY STATUS TO ANALGESIC AGENT: ICD-10-CM

## 2023-09-04 DIAGNOSIS — I95.1 ORTHOSTATIC HYPOTENSION: ICD-10-CM

## 2023-09-04 DIAGNOSIS — Z85.3 PERSONAL HISTORY OF MALIGNANT NEOPLASM OF BREAST: ICD-10-CM

## 2023-09-04 DIAGNOSIS — H81.10 BENIGN PAROXYSMAL VERTIGO, UNSPECIFIED EAR: ICD-10-CM

## 2023-09-04 DIAGNOSIS — A41.89 OTHER SPECIFIED SEPSIS: ICD-10-CM

## 2023-09-04 DIAGNOSIS — T46.1X6A UNDERDOSING OF CALCIUM-CHANNEL BLOCKERS, INITIAL ENCOUNTER: ICD-10-CM

## 2023-09-04 DIAGNOSIS — Z28.310 UNVACCINATED FOR COVID-19: ICD-10-CM

## 2023-09-04 DIAGNOSIS — Z79.899 OTHER LONG TERM (CURRENT) DRUG THERAPY: ICD-10-CM

## 2023-09-04 DIAGNOSIS — E87.6 HYPOKALEMIA: ICD-10-CM

## 2023-11-14 ENCOUNTER — APPOINTMENT (OUTPATIENT)
Dept: UROGYNECOLOGY | Facility: CLINIC | Age: 88
End: 2023-11-14
Payer: MEDICARE

## 2023-11-14 VITALS
WEIGHT: 151 LBS | HEIGHT: 60 IN | DIASTOLIC BLOOD PRESSURE: 85 MMHG | HEART RATE: 97 BPM | BODY MASS INDEX: 29.64 KG/M2 | SYSTOLIC BLOOD PRESSURE: 125 MMHG

## 2023-11-14 DIAGNOSIS — N81.3 COMPLETE UTEROVAGINAL PROLAPSE: ICD-10-CM

## 2023-11-14 DIAGNOSIS — N89.8 OTHER SPECIFIED NONINFLAMMATORY DISORDERS OF VAGINA: ICD-10-CM

## 2023-11-14 PROCEDURE — 99215 OFFICE O/P EST HI 40 MIN: CPT

## 2023-11-14 RX ORDER — METRONIDAZOLE 500 MG/1
500 TABLET ORAL TWICE DAILY
Qty: 14 | Refills: 0 | Status: ACTIVE | COMMUNITY
Start: 2023-11-14 | End: 1900-01-01

## 2023-11-14 RX ORDER — FAMOTIDINE 10 MG/1
10 TABLET, FILM COATED ORAL
Refills: 0 | Status: ACTIVE | COMMUNITY

## 2023-11-17 LAB
CANDIDA VAG CYTO: NOT DETECTED
G VAGINALIS+PREV SP MTYP VAG QL MICRO: DETECTED
T VAGINALIS VAG QL WET PREP: NOT DETECTED

## 2023-12-05 ENCOUNTER — APPOINTMENT (OUTPATIENT)
Dept: UROGYNECOLOGY | Facility: CLINIC | Age: 88
End: 2023-12-05

## 2024-03-13 NOTE — PHYSICAL THERAPY INITIAL EVALUATION ADULT - LONG TERM MEMORY, REHAB EVAL
Quality 431: Preventive Care And Screening: Unhealthy Alcohol Use - Screening: Patient not identified as an unhealthy alcohol user when screened for unhealthy alcohol use using a systematic screening method
Detail Level: Detailed
intact

## 2024-05-23 ENCOUNTER — APPOINTMENT (OUTPATIENT)
Age: 89
End: 2024-05-23

## 2024-08-08 NOTE — ED CDU PROVIDER INITIAL DAY NOTE - NS ED ATTENDING STATEMENT MOD
[facility] for patient to call back. Need to cancel her current 8/19 appointment until we get approval from her insurance for sclerotherapy. It is a procedure done in office to treat her veins. Please let the patient know or you can transfer to clinical.    Either way we will call her if/when the procedure gets approved and make her an appointment at that point.    Attending with